# Patient Record
Sex: MALE | Race: WHITE | Employment: OTHER | URBAN - METROPOLITAN AREA
[De-identification: names, ages, dates, MRNs, and addresses within clinical notes are randomized per-mention and may not be internally consistent; named-entity substitution may affect disease eponyms.]

---

## 2017-10-05 ENCOUNTER — HOSPITAL ENCOUNTER (OUTPATIENT)
Dept: RADIOLOGY | Facility: MEDICAL CENTER | Age: 62
End: 2017-10-05

## 2017-10-05 ENCOUNTER — HOSPITAL ENCOUNTER (OUTPATIENT)
Facility: MEDICAL CENTER | Age: 62
End: 2017-10-05

## 2017-10-05 ENCOUNTER — HOSPITAL ENCOUNTER (INPATIENT)
Facility: MEDICAL CENTER | Age: 62
LOS: 2 days | DRG: 683 | End: 2017-10-07
Attending: FAMILY MEDICINE | Admitting: HOSPITALIST
Payer: MEDICARE

## 2017-10-05 PROBLEM — Z72.0 NICOTINE ABUSE: Status: ACTIVE | Noted: 2017-10-05

## 2017-10-05 PROBLEM — N40.1 BENIGN PROSTATIC HYPERPLASIA WITH INCOMPLETE BLADDER EMPTYING: Status: ACTIVE | Noted: 2017-10-05

## 2017-10-05 PROBLEM — N17.9 AKI (ACUTE KIDNEY INJURY) (HCC): Status: ACTIVE | Noted: 2017-10-05

## 2017-10-05 PROBLEM — R39.14 BENIGN PROSTATIC HYPERPLASIA WITH INCOMPLETE BLADDER EMPTYING: Status: ACTIVE | Noted: 2017-10-05

## 2017-10-05 PROBLEM — E87.0 HYPERNATREMIA: Status: ACTIVE | Noted: 2017-10-05

## 2017-10-05 PROCEDURE — 700105 HCHG RX REV CODE 258: Performed by: INTERNAL MEDICINE

## 2017-10-05 PROCEDURE — 700111 HCHG RX REV CODE 636 W/ 250 OVERRIDE (IP): Performed by: INTERNAL MEDICINE

## 2017-10-05 PROCEDURE — 770006 HCHG ROOM/CARE - MED/SURG/GYN SEMI*

## 2017-10-05 PROCEDURE — 700102 HCHG RX REV CODE 250 W/ 637 OVERRIDE(OP): Performed by: INTERNAL MEDICINE

## 2017-10-05 PROCEDURE — A9270 NON-COVERED ITEM OR SERVICE: HCPCS | Performed by: INTERNAL MEDICINE

## 2017-10-05 RX ORDER — HEPARIN SODIUM 5000 [USP'U]/ML
5000 INJECTION, SOLUTION INTRAVENOUS; SUBCUTANEOUS EVERY 8 HOURS
Status: DISCONTINUED | OUTPATIENT
Start: 2017-10-05 | End: 2017-10-07 | Stop reason: HOSPADM

## 2017-10-05 RX ORDER — PROMETHAZINE HYDROCHLORIDE 12.5 MG/1
12.5-25 SUPPOSITORY RECTAL EVERY 4 HOURS PRN
Status: DISCONTINUED | OUTPATIENT
Start: 2017-10-05 | End: 2017-10-07 | Stop reason: HOSPADM

## 2017-10-05 RX ORDER — SODIUM CHLORIDE 9 MG/ML
INJECTION, SOLUTION INTRAVENOUS CONTINUOUS
Status: DISCONTINUED | OUTPATIENT
Start: 2017-10-05 | End: 2017-10-07

## 2017-10-05 RX ORDER — ONDANSETRON 2 MG/ML
4 INJECTION INTRAMUSCULAR; INTRAVENOUS EVERY 4 HOURS PRN
Status: DISCONTINUED | OUTPATIENT
Start: 2017-10-05 | End: 2017-10-07 | Stop reason: HOSPADM

## 2017-10-05 RX ORDER — ACETAMINOPHEN 325 MG/1
650 TABLET ORAL EVERY 6 HOURS PRN
Status: DISCONTINUED | OUTPATIENT
Start: 2017-10-05 | End: 2017-10-07 | Stop reason: HOSPADM

## 2017-10-05 RX ORDER — BISACODYL 10 MG
10 SUPPOSITORY, RECTAL RECTAL
Status: DISCONTINUED | OUTPATIENT
Start: 2017-10-05 | End: 2017-10-07 | Stop reason: HOSPADM

## 2017-10-05 RX ORDER — POLYETHYLENE GLYCOL 3350 17 G/17G
1 POWDER, FOR SOLUTION ORAL
Status: DISCONTINUED | OUTPATIENT
Start: 2017-10-05 | End: 2017-10-07 | Stop reason: HOSPADM

## 2017-10-05 RX ORDER — AMOXICILLIN 250 MG
2 CAPSULE ORAL 2 TIMES DAILY
Status: DISCONTINUED | OUTPATIENT
Start: 2017-10-05 | End: 2017-10-07 | Stop reason: HOSPADM

## 2017-10-05 RX ORDER — PROMETHAZINE HYDROCHLORIDE 25 MG/1
12.5-25 TABLET ORAL EVERY 4 HOURS PRN
Status: DISCONTINUED | OUTPATIENT
Start: 2017-10-05 | End: 2017-10-07 | Stop reason: HOSPADM

## 2017-10-05 RX ORDER — ONDANSETRON 4 MG/1
4 TABLET, ORALLY DISINTEGRATING ORAL EVERY 4 HOURS PRN
Status: DISCONTINUED | OUTPATIENT
Start: 2017-10-05 | End: 2017-10-07 | Stop reason: HOSPADM

## 2017-10-05 RX ORDER — LANSOPRAZOLE 30 MG/1
30 CAPSULE, DELAYED RELEASE ORAL PRN
COMMUNITY
End: 2019-07-18

## 2017-10-05 RX ADMIN — HEPARIN SODIUM 5000 UNITS: 5000 INJECTION, SOLUTION INTRAVENOUS; SUBCUTANEOUS at 21:01

## 2017-10-05 RX ADMIN — STANDARDIZED SENNA CONCENTRATE AND DOCUSATE SODIUM 2 TABLET: 8.6; 5 TABLET, FILM COATED ORAL at 21:01

## 2017-10-05 RX ADMIN — SODIUM CHLORIDE: 9 INJECTION, SOLUTION INTRAVENOUS at 21:05

## 2017-10-05 ASSESSMENT — ENCOUNTER SYMPTOMS
EYE DISCHARGE: 0
HEARTBURN: 0
DIARRHEA: 0
EYE PAIN: 0
PALPITATIONS: 0
HEADACHES: 0
INSOMNIA: 0
CONSTIPATION: 1
NAUSEA: 0
SHORTNESS OF BREATH: 0
FOCAL WEAKNESS: 0
ORTHOPNEA: 0
SEIZURES: 0
CHILLS: 0
SPUTUM PRODUCTION: 0
COUGH: 0
STRIDOR: 0
NERVOUS/ANXIOUS: 0
MYALGIAS: 0
DEPRESSION: 0
BLURRED VISION: 0
VOMITING: 0
EYE REDNESS: 0
BACK PAIN: 0
WEIGHT LOSS: 0
DIZZINESS: 0
ABDOMINAL PAIN: 1
FEVER: 0
NECK PAIN: 0

## 2017-10-05 ASSESSMENT — PAIN SCALES - GENERAL: PAINLEVEL_OUTOF10: 0

## 2017-10-05 ASSESSMENT — LIFESTYLE VARIABLES
EVER_SMOKED: YES
DO YOU DRINK ALCOHOL: NO

## 2017-10-05 NOTE — PROGRESS NOTES
Direct admit from Dr. Jewell at [facility]. Dr. Mccormack/Yash accepting for Acute Kidney injury and Obstructive Uropathy. ADT signed and held 10/05/2017 at 1450 and will need to be released upon patient arrival to unit. Patient arriving via ground transport.

## 2017-10-06 ENCOUNTER — APPOINTMENT (OUTPATIENT)
Dept: RADIOLOGY | Facility: MEDICAL CENTER | Age: 62
DRG: 683 | End: 2017-10-06
Attending: INTERNAL MEDICINE
Payer: MEDICARE

## 2017-10-06 ENCOUNTER — RESOLUTE PROFESSIONAL BILLING HOSPITAL PROF FEE (OUTPATIENT)
Dept: HOSPITALIST | Facility: MEDICAL CENTER | Age: 62
End: 2017-10-06
Payer: MEDICARE

## 2017-10-06 PROBLEM — I10 HYPERTENSION: Status: ACTIVE | Noted: 2017-10-06

## 2017-10-06 LAB
ALBUMIN SERPL BCP-MCNC: 3.4 G/DL (ref 3.2–4.9)
ALBUMIN/GLOB SERPL: 1.4 G/DL
ALP SERPL-CCNC: 78 U/L (ref 30–99)
ALT SERPL-CCNC: <5 U/L (ref 2–50)
ANION GAP SERPL CALC-SCNC: 9 MMOL/L (ref 0–11.9)
AST SERPL-CCNC: 9 U/L (ref 12–45)
BILIRUB SERPL-MCNC: 0.6 MG/DL (ref 0.1–1.5)
BUN SERPL-MCNC: 26 MG/DL (ref 8–22)
CALCIUM SERPL-MCNC: 8.3 MG/DL (ref 8.5–10.5)
CHLORIDE SERPL-SCNC: 112 MMOL/L (ref 96–112)
CO2 SERPL-SCNC: 24 MMOL/L (ref 20–33)
CREAT SERPL-MCNC: 2.98 MG/DL (ref 0.5–1.4)
ERYTHROCYTE [DISTWIDTH] IN BLOOD BY AUTOMATED COUNT: 44.1 FL (ref 35.9–50)
GFR SERPL CREATININE-BSD FRML MDRD: 21 ML/MIN/1.73 M 2
GLOBULIN SER CALC-MCNC: 2.5 G/DL (ref 1.9–3.5)
GLUCOSE SERPL-MCNC: 98 MG/DL (ref 65–99)
HCT VFR BLD AUTO: 41 % (ref 42–52)
HGB BLD-MCNC: 13.3 G/DL (ref 14–18)
MCH RBC QN AUTO: 28.5 PG (ref 27–33)
MCHC RBC AUTO-ENTMCNC: 32.4 G/DL (ref 33.7–35.3)
MCV RBC AUTO: 87.8 FL (ref 81.4–97.8)
PLATELET # BLD AUTO: 234 K/UL (ref 164–446)
PMV BLD AUTO: 9.6 FL (ref 9–12.9)
POTASSIUM SERPL-SCNC: 4 MMOL/L (ref 3.6–5.5)
PROT SERPL-MCNC: 5.9 G/DL (ref 6–8.2)
PSA SERPL-MCNC: 13.04 NG/ML (ref 0–4)
RBC # BLD AUTO: 4.67 M/UL (ref 4.7–6.1)
SODIUM SERPL-SCNC: 145 MMOL/L (ref 135–145)
WBC # BLD AUTO: 5.6 K/UL (ref 4.8–10.8)

## 2017-10-06 PROCEDURE — 76775 US EXAM ABDO BACK WALL LIM: CPT

## 2017-10-06 PROCEDURE — 700102 HCHG RX REV CODE 250 W/ 637 OVERRIDE(OP): Performed by: HOSPITALIST

## 2017-10-06 PROCEDURE — 99223 1ST HOSP IP/OBS HIGH 75: CPT | Performed by: INTERNAL MEDICINE

## 2017-10-06 PROCEDURE — 700111 HCHG RX REV CODE 636 W/ 250 OVERRIDE (IP): Performed by: INTERNAL MEDICINE

## 2017-10-06 PROCEDURE — 90732 PPSV23 VACC 2 YRS+ SUBQ/IM: CPT | Performed by: FAMILY MEDICINE

## 2017-10-06 PROCEDURE — 84153 ASSAY OF PSA TOTAL: CPT

## 2017-10-06 PROCEDURE — 700102 HCHG RX REV CODE 250 W/ 637 OVERRIDE(OP): Performed by: NURSE PRACTITIONER

## 2017-10-06 PROCEDURE — 700102 HCHG RX REV CODE 250 W/ 637 OVERRIDE(OP): Performed by: INTERNAL MEDICINE

## 2017-10-06 PROCEDURE — 90471 IMMUNIZATION ADMIN: CPT

## 2017-10-06 PROCEDURE — A9270 NON-COVERED ITEM OR SERVICE: HCPCS | Performed by: HOSPITALIST

## 2017-10-06 PROCEDURE — A9270 NON-COVERED ITEM OR SERVICE: HCPCS | Performed by: NURSE PRACTITIONER

## 2017-10-06 PROCEDURE — A9270 NON-COVERED ITEM OR SERVICE: HCPCS | Performed by: INTERNAL MEDICINE

## 2017-10-06 PROCEDURE — 85027 COMPLETE CBC AUTOMATED: CPT

## 2017-10-06 PROCEDURE — 700111 HCHG RX REV CODE 636 W/ 250 OVERRIDE (IP): Performed by: FAMILY MEDICINE

## 2017-10-06 PROCEDURE — 770006 HCHG ROOM/CARE - MED/SURG/GYN SEMI*

## 2017-10-06 PROCEDURE — 36415 COLL VENOUS BLD VENIPUNCTURE: CPT

## 2017-10-06 PROCEDURE — 80053 COMPREHEN METABOLIC PANEL: CPT

## 2017-10-06 RX ORDER — TAMSULOSIN HYDROCHLORIDE 0.4 MG/1
0.4 CAPSULE ORAL
Status: DISCONTINUED | OUTPATIENT
Start: 2017-10-06 | End: 2017-10-07

## 2017-10-06 RX ORDER — LABETALOL HYDROCHLORIDE 5 MG/ML
10 INJECTION, SOLUTION INTRAVENOUS EVERY 4 HOURS PRN
Status: DISCONTINUED | OUTPATIENT
Start: 2017-10-06 | End: 2017-10-07 | Stop reason: HOSPADM

## 2017-10-06 RX ORDER — AMLODIPINE BESYLATE 5 MG/1
5 TABLET ORAL
Status: DISCONTINUED | OUTPATIENT
Start: 2017-10-06 | End: 2017-10-07

## 2017-10-06 RX ADMIN — TAMSULOSIN HYDROCHLORIDE 0.4 MG: 0.4 CAPSULE ORAL at 09:08

## 2017-10-06 RX ADMIN — AMLODIPINE BESYLATE 5 MG: 5 TABLET ORAL at 11:28

## 2017-10-06 RX ADMIN — PNEUMOCOCCAL VACCINE POLYVALENT 25 MCG
25; 25; 25; 25; 25; 25; 25; 25; 25; 25; 25; 25; 25; 25; 25; 25; 25; 25; 25; 25; 25; 25; 25 INJECTION, SOLUTION INTRAMUSCULAR; SUBCUTANEOUS at 09:08

## 2017-10-06 RX ADMIN — HEPARIN SODIUM 5000 UNITS: 5000 INJECTION, SOLUTION INTRAVENOUS; SUBCUTANEOUS at 20:59

## 2017-10-06 RX ADMIN — HEPARIN SODIUM 5000 UNITS: 5000 INJECTION, SOLUTION INTRAVENOUS; SUBCUTANEOUS at 13:43

## 2017-10-06 RX ADMIN — STANDARDIZED SENNA CONCENTRATE AND DOCUSATE SODIUM 2 TABLET: 8.6; 5 TABLET, FILM COATED ORAL at 09:08

## 2017-10-06 RX ADMIN — HEPARIN SODIUM 5000 UNITS: 5000 INJECTION, SOLUTION INTRAVENOUS; SUBCUTANEOUS at 05:36

## 2017-10-06 ASSESSMENT — ENCOUNTER SYMPTOMS
NAUSEA: 0
PALPITATIONS: 0
DIARRHEA: 0
VOMITING: 0
BACK PAIN: 0
CONSTIPATION: 0
ABDOMINAL PAIN: 0
BLURRED VISION: 0
SHORTNESS OF BREATH: 0
MYALGIAS: 0
WEAKNESS: 0
CHILLS: 0
FOCAL WEAKNESS: 0
HEADACHES: 0
FEVER: 0
DOUBLE VISION: 0
COUGH: 0
DIZZINESS: 0

## 2017-10-06 ASSESSMENT — PAIN SCALES - GENERAL
PAINLEVEL_OUTOF10: 2
PAINLEVEL_OUTOF10: 0

## 2017-10-06 NOTE — H&P
Hospital Medicine History and Physical      Date of Service  10/5/2017    Chief Complaint  No chief complaint on file.      History of Presenting Illness  Joann is a 62 y.o. Male, directly admitted to the hospital for PB from obstructive uropathy. Per patient, he has been difficulty having initiation of urination for the past 2-3 weeks. So he initially presented to outside facility due to abdominal pain. He was found to have urinary retention and PB. [place] was placed and removed >2L of urine. He was transferred here for further management. He is feeling much better with his abdominal discomfort. Dr. Jauregui was consulted and will see him tomorrow.    Primary Care Physician  No primary care provider on file.      Code Status  Full code    Review of Systems  Review of Systems   Constitutional: Negative for chills, fever and weight loss.   HENT: Negative for congestion and nosebleeds.    Eyes: Negative for blurred vision, pain, discharge and redness.   Respiratory: Negative for cough, sputum production, shortness of breath and stridor.    Cardiovascular: Negative for chest pain, palpitations and orthopnea.   Gastrointestinal: Positive for abdominal pain and constipation. Negative for diarrhea, heartburn, nausea and vomiting.   Genitourinary: Negative for dysuria, frequency and urgency.   Musculoskeletal: Negative for back pain, myalgias and neck pain.   Skin: Negative for itching and rash.   Neurological: Negative for dizziness, focal weakness, seizures and headaches.   Psychiatric/Behavioral: Negative for depression. The patient is not nervous/anxious and does not have insomnia.      Please see HPI, all other systems were reviewed and are negative (AMA/CMS criteria)     Past Medical History  No past medical history on file.    Surgical History  No past surgical history on file.    Medications  No current facility-administered medications on file prior to encounter.      No current outpatient prescriptions on file  prior to encounter.     Family History  No family history on file.  No pertinent family history    Social History  Social History   Substance Use Topics   • Smoking status: Not on file   • Smokeless tobacco: Not on file   • Alcohol use Not on file     Smoke: smoke around 5-10 cigarettes a day  Allergies  No Known Allergies     Physical Exam  Laboratory   Hemodynamics  Temp (24hrs), Av.7 °C (98 °F), Min:36.6 °C (97.8 °F), Max:36.7 °C (98.1 °F)   Temperature: 36.6 °C (97.8 °F)  Pulse  Av.5  Min: 79  Max: 90    Blood Pressure: 152/76      Respiratory      Respiration: 17, Pulse Oximetry: 91 %             Physical Exam   Constitutional: He is oriented to person, place, and time. No distress.   HENT:   Head: Normocephalic and atraumatic.   Mouth/Throat: Oropharynx is clear and moist.   Eyes: Conjunctivae and EOM are normal. Pupils are equal, round, and reactive to light.   Neck: Normal range of motion. Neck supple. No tracheal deviation present. No thyromegaly present.   Cardiovascular: Normal rate and regular rhythm.    No murmur heard.  Pulmonary/Chest: Effort normal and breath sounds normal. No respiratory distress. He has no wheezes.   Abdominal: Soft. Bowel sounds are normal. He exhibits no distension. There is no tenderness.   Genitourinary:   Genitourinary Comments: On shoemaker   Musculoskeletal: He exhibits no edema or tenderness.   Neurological: He is alert and oriented to person, place, and time. No cranial nerve deficit.   Skin: Skin is warm and dry. He is not diaphoretic. No erythema.   Psychiatric: He has a normal mood and affect. His behavior is normal. Thought content normal.               No results for input(s): ALTSGPT, ASTSGOT, ALKPHOSPHAT, TBILIRUBIN, DBILIRUBIN, GAMMAGT, AMYLASE, LIPASE, ALB, PREALBUMIN, GLUCOSE in the last 72 hours.              No results found for: TROPONINI    Imaging  No orders to display       Labs: cbc: wnl  Na: 150, K 4.4, Cl117, Co2 27, Bun: 40, Cr: 3.6    Assessment/Plan     I anticipate this patient will require at least two midnights for appropriate medical management, necessitating inpatient admission.    PB (acute kidney injury) (CMS-HCC)- (present on admission)   Assessment & Plan    Cr: 3.6  From obstructive uropathy  On shoemaker  On IVF  Avoid nephrotoxic drugs  Renal ultrasound pending  Urology: consulted        Nicotine abuse   Assessment & Plan    Smoking cessation education was given        Hypernatremia- (present on admission)   Assessment & Plan    On IVF  Follow cmp in am        Benign prostatic hyperplasia with incomplete bladder emptying- (present on admission)   Assessment & Plan    Plan as above            Prophylaxis:  sc heparin

## 2017-10-06 NOTE — CARE PLAN
Problem: Urinary Elimination:  Goal: Ability to reestablish a normal urinary elimination pattern will improve    Intervention: Evaluate need to continue indwelling urinary catheter  Shoemaker cath cdi,patent,shoemaker catheter given.

## 2017-10-06 NOTE — CONSULTS
DATE OF CONSULTATION: 10/6/2017    REQUESTING PHYSICIAN:  Earl Olivarez M.D.    CONSULTING PHYSICIAN: Urology Clotilde Meza PA-C and Humberto Zafar M.D.    REASON FOR CONSULTATION: Obstructive uropathy      HISTORY OF PRESENT ILLNESS: 63 y/o male presents due to abdominal pain present for 2-3 weeks.  Shoemaker was placed at initial presentation and >2 Liter of urine was drained.  Pt. Denies history of difficulty urinating or prior urology workup.  Denies use of flomax or other urology medications.  Admits that one week ago he started having slow flow, dribbling, and frequency without urgency, hematuria, nocturia, or dysuria.  Was transferred from [facility] for PB with a Creatinine of 3.6.  Renal ultasound done in ER shows mild bilateral pelviectasis.  Currently feels well; shoemaker in place and draining clear urine. Creatinine improved to 2.98.  Denies N/V, fever, chills, SOB.  Admits headache.        PAST MEDICAL HISTORY: No past medical history on file.      MEDICATIONS: Reviewed.     ALLERGIES: NKDA    FAMILY HISTORY: Non-contributory.    SOCIAL HISTORY: Smokes 5-10 cigarettes a day.  Denies alcohol and drug use.    REVIEW OF SYSTEMS:   All other review of systems were negative except under history of present illness.     PHYSICAL EXAMINATION:   GENERAL:  Well developed, well nourished.  NAD.   HEAD: Normocephalic, atraumatic.  Good symmetry.  NECK: Symmetrical. Supple, full ROM.  LUNGS: Normal respiratory effort.  CTAB.   ABDOMEN: Soft, Non distended.  Benign. No TTP.  : Circumcised phallus with shoemaker catheter in place.  Draining pink tinged urine without clots.  SKIN: Warm, dry.  No rashes.  NEUROLOGIC: A&O X3    LABORATORY DATA: Recent labs were reviewed.     IMAGING: Renal U/S  Impression       Mild bilateral pelviectasis.    Bladder wall thickening not excluded. Bladder is decompressed by a Shoemaker catheter.         IMPRESSION:   Obstructive uropathy with shoemaker catheter in place  PB  B/L  pelviectasis    PLAN:  1.No emergent need for treatment at this time.  Continue flomax, trend renal function.  Will keep shoemaker for at least 2 weeks and plan for outpatient voiding trial.  2.  We will follow.    Plan discussed with RN, pt and Dr Zafar who is aware of this consult and has directed this plan of care.

## 2017-10-06 NOTE — PROGRESS NOTES
Assumed care of patient at 0700. Patient with shoemaker in place draining dark aden, blood tinged urine. IVF running through PIV without difficulty. US of abdomen completed this am and diet advanced to Regular for lunch. SW consult placed for patient's concerns about living situation after discharge. Call bell and personal items within reach. Will continue to monitor.     Emelia Johnson RN

## 2017-10-06 NOTE — ASSESSMENT & PLAN NOTE
Renal US shows mild pelviectasis with no other acute finding.  Cr 2.98.  Likely from obstructive uropathy.  PSA ordered.  [place] in place.  Conintue IVF  Avoid nephrotoxic drugs  Urology to follow.

## 2017-10-06 NOTE — PROGRESS NOTES
Patient skin intact,some pink and dusky colored areas on bilateral shin intact,assessed with another nurse,Zara SAUCEDO.

## 2017-10-06 NOTE — CARE PLAN
Problem: Safety  Goal: Will remain free from falls    Intervention: Implement fall precautions  Call light and personal belongings within reach. Pt instructed to call for assistance, pt verbalizes understanding. Non skid socks on. Bed in lowest position.

## 2017-10-06 NOTE — PROGRESS NOTES
Patient is a direct admit from [facility],alert/oriented x4,room air,1Lof NS given,npo at midnight for possible procedure tomorrow,refused bed alarm despite education,fall prevention in placed,denies pain.

## 2017-10-06 NOTE — PROGRESS NOTES
Hospital Medicine Interval Note  Date of Service:  10/6/2017    Chief Complaint  62 y.o.-year-old male admitted 10/5/2017 with urinary retention.  Found to have PB secondary to obstructive uropathy.    Interval Problem Update  [place] in place.  Hematuria improved.  Creatinine 2.98.  Renal US negative for acute abnormality.     Consultants/Specialty  Urology     Disposition  Anticipate home at d/c when stable.      Review of Systems   Constitutional: Negative for chills and fever.   HENT: Negative for congestion.    Eyes: Negative for blurred vision and double vision.   Respiratory: Negative for cough and shortness of breath.    Cardiovascular: Negative for chest pain, palpitations and leg swelling.   Gastrointestinal: Negative for abdominal pain, constipation, diarrhea, nausea and vomiting.   Genitourinary: Positive for frequency and hematuria.   Musculoskeletal: Negative for back pain and myalgias.   Skin: Negative for itching and rash.   Neurological: Negative for dizziness, focal weakness, weakness and headaches.      Physical Exam Laboratory/Imaging   Vitals:    10/05/17 1900 10/05/17 2052 10/06/17 0422 10/06/17 0914   BP: 152/76  129/75 (!) 175/98   Pulse: 79  68 75   Resp: 17  18 12   Temp: 36.6 °C (97.8 °F)  36.2 °C (97.1 °F) 36.6 °C (97.9 °F)   SpO2: 91%  93% 94%   Weight:  104.1 kg (229 lb 8 oz)     Height:         Physical Exam   Constitutional: He is oriented to person, place, and time. He appears well-developed and well-nourished. No distress.   HENT:   Head: Normocephalic and atraumatic.   Mouth/Throat: No oropharyngeal exudate.   Eyes: Conjunctivae are normal. Right eye exhibits no discharge. Left eye exhibits no discharge.   Neck: Neck supple. No tracheal deviation present.   Cardiovascular: Normal rate, regular rhythm, normal heart sounds and intact distal pulses.    No murmur heard.  Pulmonary/Chest: Effort normal and breath sounds normal. No stridor. No respiratory distress. He has no wheezes.    Abdominal: Soft. Bowel sounds are normal. He exhibits no distension. There is no tenderness.   Genitourinary:   Genitourinary Comments: Shoemaker catheter in place.   Musculoskeletal: Normal range of motion. He exhibits no edema or deformity.   Neurological: He is alert and oriented to person, place, and time. No cranial nerve deficit.   Skin: Skin is warm and dry. No rash noted. He is not diaphoretic. No erythema.    Lab Results   Component Value Date/Time    WBC 5.6 10/06/2017 03:23 AM    HEMOGLOBIN 13.3 (L) 10/06/2017 03:23 AM    HEMATOCRIT 41.0 (L) 10/06/2017 03:23 AM    PLATELETCT 234 10/06/2017 03:23 AM     Lab Results   Component Value Date/Time    SODIUM 145 10/06/2017 03:23 AM    POTASSIUM 4.0 10/06/2017 03:23 AM    CHLORIDE 112 10/06/2017 03:23 AM    CO2 24 10/06/2017 03:23 AM    GLUCOSE 98 10/06/2017 03:23 AM    BUN 26 (H) 10/06/2017 03:23 AM    CREATININE 2.98 (H) 10/06/2017 03:23 AM      Assessment/Plan    PB (acute kidney injury) (CMS-HCC)- (present on admission)   Assessment & Plan    Renal US shows mild pelviectasis with no other acute finding.  Cr 2.98.  Likely from obstructive uropathy.  PSA ordered.  Shoemaker in place.  Conintue IVF  Avoid nephrotoxic drugs  Urology to follow.        Hypertension- (present on admission)   Assessment & Plan    Start norvasc.        Nicotine abuse   Assessment & Plan    Smoking cessation education was given        Hypernatremia- (present on admission)   Assessment & Plan    Improved with IVF.  Follow bmp.        Benign prostatic hyperplasia with incomplete bladder emptying- (present on admission)   Assessment & Plan    Continue flomax.  Will need shoemaker catheter for 2 weeks.  Urology following.             Shoemaker catheter::  Urinary Tract Retention or Urinary Tract Obstruction  DVT prophylaxis pharmacological::  Heparin  Ulcer Prophylaxis::  Not indicated

## 2017-10-07 VITALS
TEMPERATURE: 98.1 F | DIASTOLIC BLOOD PRESSURE: 78 MMHG | HEIGHT: 68 IN | RESPIRATION RATE: 20 BRPM | SYSTOLIC BLOOD PRESSURE: 146 MMHG | WEIGHT: 229.5 LBS | OXYGEN SATURATION: 92 % | BODY MASS INDEX: 34.78 KG/M2 | HEART RATE: 86 BPM

## 2017-10-07 LAB
ANION GAP SERPL CALC-SCNC: 8 MMOL/L (ref 0–11.9)
BUN SERPL-MCNC: 22 MG/DL (ref 8–22)
CALCIUM SERPL-MCNC: 8.9 MG/DL (ref 8.5–10.5)
CHLORIDE SERPL-SCNC: 109 MMOL/L (ref 96–112)
CO2 SERPL-SCNC: 27 MMOL/L (ref 20–33)
CREAT SERPL-MCNC: 2.15 MG/DL (ref 0.5–1.4)
ERYTHROCYTE [DISTWIDTH] IN BLOOD BY AUTOMATED COUNT: 41.5 FL (ref 35.9–50)
GFR SERPL CREATININE-BSD FRML MDRD: 31 ML/MIN/1.73 M 2
GLUCOSE SERPL-MCNC: 96 MG/DL (ref 65–99)
HCT VFR BLD AUTO: 41.8 % (ref 42–52)
HGB BLD-MCNC: 13.8 G/DL (ref 14–18)
MCH RBC QN AUTO: 28.6 PG (ref 27–33)
MCHC RBC AUTO-ENTMCNC: 33 G/DL (ref 33.7–35.3)
MCV RBC AUTO: 86.7 FL (ref 81.4–97.8)
PLATELET # BLD AUTO: 245 K/UL (ref 164–446)
PMV BLD AUTO: 10.3 FL (ref 9–12.9)
POTASSIUM SERPL-SCNC: 4.1 MMOL/L (ref 3.6–5.5)
RBC # BLD AUTO: 4.82 M/UL (ref 4.7–6.1)
SODIUM SERPL-SCNC: 144 MMOL/L (ref 135–145)
WBC # BLD AUTO: 5.8 K/UL (ref 4.8–10.8)

## 2017-10-07 PROCEDURE — A9270 NON-COVERED ITEM OR SERVICE: HCPCS | Performed by: NURSE PRACTITIONER

## 2017-10-07 PROCEDURE — 700105 HCHG RX REV CODE 258: Performed by: INTERNAL MEDICINE

## 2017-10-07 PROCEDURE — 99239 HOSP IP/OBS DSCHRG MGMT >30: CPT | Performed by: HOSPITALIST

## 2017-10-07 PROCEDURE — 700111 HCHG RX REV CODE 636 W/ 250 OVERRIDE (IP): Performed by: INTERNAL MEDICINE

## 2017-10-07 PROCEDURE — 80048 BASIC METABOLIC PNL TOTAL CA: CPT

## 2017-10-07 PROCEDURE — 700101 HCHG RX REV CODE 250: Performed by: NURSE PRACTITIONER

## 2017-10-07 PROCEDURE — 36415 COLL VENOUS BLD VENIPUNCTURE: CPT

## 2017-10-07 PROCEDURE — A9270 NON-COVERED ITEM OR SERVICE: HCPCS | Performed by: HOSPITALIST

## 2017-10-07 PROCEDURE — 700102 HCHG RX REV CODE 250 W/ 637 OVERRIDE(OP): Performed by: HOSPITALIST

## 2017-10-07 PROCEDURE — 700102 HCHG RX REV CODE 250 W/ 637 OVERRIDE(OP): Performed by: NURSE PRACTITIONER

## 2017-10-07 PROCEDURE — 85027 COMPLETE CBC AUTOMATED: CPT

## 2017-10-07 RX ORDER — DOXAZOSIN MESYLATE 1 MG/1
1 TABLET ORAL
Status: DISCONTINUED | OUTPATIENT
Start: 2017-10-07 | End: 2017-10-07 | Stop reason: HOSPADM

## 2017-10-07 RX ORDER — DOXAZOSIN MESYLATE 1 MG/1
1 TABLET ORAL
Qty: 30 TAB | Refills: 2 | Status: SHIPPED | OUTPATIENT
Start: 2017-10-07 | End: 2018-06-11

## 2017-10-07 RX ORDER — TAMSULOSIN HYDROCHLORIDE 0.4 MG/1
0.4 CAPSULE ORAL
Qty: 30 CAP | Refills: 3 | Status: SHIPPED | OUTPATIENT
Start: 2017-10-07 | End: 2017-11-29

## 2017-10-07 RX ORDER — AMLODIPINE BESYLATE 10 MG/1
10 TABLET ORAL
Status: DISCONTINUED | OUTPATIENT
Start: 2017-10-07 | End: 2017-10-07 | Stop reason: HOSPADM

## 2017-10-07 RX ORDER — AMLODIPINE BESYLATE 10 MG/1
10 TABLET ORAL DAILY
Qty: 30 TAB | Refills: 3 | Status: ON HOLD | OUTPATIENT
Start: 2017-10-07 | End: 2017-12-09

## 2017-10-07 RX ADMIN — TAMSULOSIN HYDROCHLORIDE 0.4 MG: 0.4 CAPSULE ORAL at 09:31

## 2017-10-07 RX ADMIN — DOXAZOSIN MESYLATE 1 MG: 1 TABLET ORAL at 13:52

## 2017-10-07 RX ADMIN — AMLODIPINE BESYLATE 10 MG: 10 TABLET ORAL at 09:31

## 2017-10-07 RX ADMIN — SODIUM CHLORIDE: 9 INJECTION, SOLUTION INTRAVENOUS at 03:20

## 2017-10-07 RX ADMIN — LABETALOL HYDROCHLORIDE 10 MG: 5 INJECTION, SOLUTION INTRAVENOUS at 11:17

## 2017-10-07 RX ADMIN — HEPARIN SODIUM 5000 UNITS: 5000 INJECTION, SOLUTION INTRAVENOUS; SUBCUTANEOUS at 05:13

## 2017-10-07 ASSESSMENT — PAIN SCALES - GENERAL: PAINLEVEL_OUTOF10: 3

## 2017-10-07 ASSESSMENT — LIFESTYLE VARIABLES: EVER_SMOKED: YES

## 2017-10-07 NOTE — PROGRESS NOTES
Discharge order received. PIV removed, tip intact, no issues noted.  Printed discharge instructions and prescriptions given to pt. All discharge education complete, specifically need to f/u with Urology in 2 wks, f/u with PCP in the next couple wks for hypertension and s/s of hypotension, hypertension and  UTI, all questions and concerns were addressed. Pt left with [place] catheter in place, extensive education done on care at home of [place] catheter, pt able to verbalize and demonstrate proper care to this RN prior to discharge. Education done on reasons to call PCP pr come back through ED, such as tachycardia, fever, chills, dizziness and so on. VSS. BP stable prior to discharge at  146/78. Labs stable. Pt decline staff transport, walked off unit with step father.

## 2017-10-07 NOTE — CARE PLAN
Problem: Safety  Goal: Will remain free from falls  Outcome: PROGRESSING AS EXPECTED  Patient out of bed independently, non skid socks in place, steady gait

## 2017-10-07 NOTE — CARE PLAN
Problem: Venous Thromboembolism (VTW)/Deep Vein Thrombosis (DVT) Prevention:  Goal: Patient will participate in Venous Thrombosis (VTE)/Deep Vein Thrombosis (DVT)Prevention Measures  Outcome: PROGRESSING AS EXPECTED  Patient ambulating, taking heparin injections

## 2017-10-07 NOTE — CARE PLAN
Problem: Safety  Goal: Will remain free from falls    Intervention: Implement fall precautions  Call light and personal belongings within reach. Pt instructed to call for assistance, pt verbalizes understanding. Non skid socks on.  Bed in lowest position. Hourly rounding in placed      Problem: Venous Thromboembolism (VTW)/Deep Vein Thrombosis (DVT) Prevention:  Goal: Patient will participate in Venous Thrombosis (VTE)/Deep Vein Thrombosis (DVT)Prevention Measures    Intervention: Assess and monitor for anticoagulation complications  Patient is on heparin for dvt prophylaxis without complication.

## 2017-10-07 NOTE — PROGRESS NOTES
Administered pt's morning meds.  Pt is A&O x4.  Pt is being d/c today, will discuss instructions for at home shoemaker care before discharge.    Recheck pt's BP in 1 hour.  Bed in lowest position, call light within reach, no patient concerns at this time. Will continue to monitor.

## 2017-10-07 NOTE — PROGRESS NOTES
notified of increased BP of 182/89 after PO Norvasc and IV Labetalol. New order received from Daniele IRBY.

## 2017-10-07 NOTE — PROGRESS NOTES
1:21 PM         Problem: Discharge Barriers/Planning  Goal: Patient's continuum of care needs will be met  Outcome: PROGRESSING AS EXPECTED  Extensive education done on at home Spain care, discussed need to f/u in 2 wks with Urology with pt's step father who is pt's primary access to transportation. Step Father states he will be able to give pt a ride at time of f/u     Problem: Urinary Elimination:  Goal: Ability to reestablish a normal urinary elimination pattern will improve  Outcome: PROGRESSING AS EXPECTED  [place] catheter draining large amount of urine. [place] catheter to remain in place x 2 wks and pt will f/u with Urology outpatient.

## 2017-10-07 NOTE — PROGRESS NOTES
Patient ambulated to the bathroom few times with some loose stools,no c/o pain,urine output around 2100 was 800 clear dark orange urine output.

## 2017-10-07 NOTE — PROGRESS NOTES
"Urology Progress Note    S: No fevers, chills, nausea or vomiting.  Cr trending down since shoemaker placed.  Good UOP.  O:   Blood pressure (!) 179/97, pulse 86, temperature 36.7 °C (98.1 °F), resp. rate 20, height 1.727 m (5' 8\"), weight 104.1 kg (229 lb 8 oz), SpO2 92 %.  Recent Labs      10/06/17   0323  10/07/17   0325   SODIUM  145  144   POTASSIUM  4.0  4.1   CHLORIDE  112  109   CO2  24  27   GLUCOSE  98  96   BUN  26*  22   CREATININE  2.98*  2.15*   CALCIUM  8.3*  8.9     Recent Labs      10/06/17   0323  10/07/17   0325   WBC  5.6  5.8   RBC  4.67*  4.82   HEMOGLOBIN  13.3*  13.8*   HEMATOCRIT  41.0*  41.8*   MCV  87.8  86.7   MCH  28.5  28.6   MCHC  32.4*  33.0*   RDW  44.1  41.5   PLATELETCT  234  245   MPV  9.6  10.3         Intake/Output Summary (Last 24 hours) at 10/07/17 1152  Last data filed at 10/07/17 1115   Gross per 24 hour   Intake             1420 ml   Output             3100 ml   Net            -1680 ml       Exam:  Abdomen soft, benign.    Urine: clear with shoemaker in place     A/P:    Active Hospital Problems    Diagnosis   • PB (acute kidney injury) (CMS-HCC) [N17.9]     Priority: High   • Hypertension [I10]   • Benign prostatic hyperplasia with incomplete bladder emptying [N40.1, R39.14]   • Hypernatremia [E87.0]   • Nicotine abuse [Z72.0]     PLAN:  1.  We will sign off.    2.  My office will contact pt to arrange follow up appointment in two weeks for voiding trial.    3.  Home with shoemaker and Flomax 0.4mg daily  "

## 2017-10-07 NOTE — PROGRESS NOTES
Rechecked pt's /77, resource RN made aware.  Instructed pt on how to drain shoemaker and attachment of leg bag.

## 2017-10-07 NOTE — DISCHARGE SUMMARY
CHIEF COMPLAINT ON ADMISSION:  Urinary retention.    HISTORY OF PRESENT ILLNESS AND HOSPITAL COURSE:  For complete history and   physical, please review the note posted by Dr. Olivarez on 10/05/2017.  In summary,   this is a 62-year-old male who presented with a 2-3 week period of urinary   retention and difficulty urinating.  On admission to the emergency room, the   patient had a greater than 2 liters of urine on bladder scan.  At that time, a   [place] catheter was placed.  The patient was started on Flomax.  The patient   was found to have a creatinine of 3.6 likely secondary to obstructive   uropathy.  Dr. Zafar assessed the patient and determined that there was no   surgical need.  At this time, the patient would require the [place] for a   2-week period.  At that time, would have a voiding trial as an outpatient.    The patient is, otherwise, stable and had some slow improvement of his   creatinine with IV fluids.  At this time, as the patient is stable, he will be   discharged home.    DISCHARGE PROBLEM LIST:  1.  Acute kidney injury.  2.  Benign prostatic hypertrophy.  3.  Nicotine abuse.    FOLLOWUP APPOINTMENTS:  The patient will follow up with primary care physician   in 1-2 weeks of the discharge.  The patient will also follow with Dr. Zafar in 2 weeks as an outpatient.    DISCHARGE MEDICATIONS:  1.  Norvasc 10 mg tab, take 10 mg by mouth every day.  2.  Flomax 0.4 mg capsule, take 0.4 mg after breakfast.    DISCHARGE DIET:  Regular diet.    ACTIVITY:  As tolerated.    CONSULTATIONS:  Demetrio Zafar MD    LABORATORY DATA:  Sodium 144, potassium 4.1, chloride 109, CO2 of 27, glucose   96, BUN 22, creatinine 2.15, white blood cells 5.8, hemoglobin 13.8,   hematocrit 41.8, platelet count 245.  PSA 13.04.    Total time of discharge process was approximately 38 minutes.       ____________________________________     JAM Romero    CSF / NTS    DD:  10/07/2017 12:13:35  DT:  10/07/2017  12:47:05    D#:  [id number]  Job#:  [id number]

## 2017-10-07 NOTE — DISCHARGE INSTRUCTIONS
Discharge Instructions    Discharged to home by car with relative. Discharged via wheelchair, hospital escort: Yes.  Special equipment needed: Not Applicable    Be sure to schedule a follow-up appointment with your primary care doctor or any specialists as instructed.     Discharge Plan:   Diet Plan: Discussed  Activity Level: Discussed  Smoking Cessation Offered: Patient Counseled  Confirmed Follow up Appointment: Patient to Call and Schedule Appointment  Confirmed Symptoms Management: Discussed  Medication Reconciliation Updated: Yes  Pneumococcal Vaccine Given - only chart on this line when given: Given (See MAR)  Influenza Vaccine Indication: Patient Refuses    I understand that a diet low in cholesterol, fat, and sodium is recommended for good health. Unless I have been given specific instructions below for another diet, I accept this instruction as my diet prescription.   Other diet: regular    Special Instructions: None    · Is patient discharged on Warfarin / Coumadin?   No     · Is patient Post Blood Transfusion?  No      Hypertension  Hypertension, commonly called high blood pressure, is when the force of blood pumping through your arteries is too strong. Your arteries are the blood vessels that carry blood from your heart throughout your body. A blood pressure reading consists of a higher number over a lower number, such as 110/72. The higher number (systolic) is the pressure inside your arteries when your heart pumps. The lower number (diastolic) is the pressure inside your arteries when your heart relaxes. Ideally you want your blood pressure below 120/80.  Hypertension forces your heart to work harder to pump blood. Your arteries may become narrow or stiff. Having untreated or uncontrolled hypertension can cause heart attack, stroke, kidney disease, and other problems.  RISK FACTORS  Some risk factors for high blood pressure are controllable. Others are not.   Risk factors you cannot control include:    · Race. You may be at higher risk if you are .  · Age. Risk increases with age.  · Gender. Men are at higher risk than women before age 45 years. After age 65, women are at higher risk than men.  Risk factors you can control include:  · Not getting enough exercise or physical activity.  · Being overweight.  · Getting too much fat, sugar, calories, or salt in your diet.  · Drinking too much alcohol.  SIGNS AND SYMPTOMS  Hypertension does not usually cause signs or symptoms. Extremely high blood pressure (hypertensive crisis) may cause headache, anxiety, shortness of breath, and nosebleed.  DIAGNOSIS  To check if you have hypertension, your health care provider will measure your blood pressure while you are seated, with your arm held at the level of your heart. It should be measured at least twice using the same arm. Certain conditions can cause a difference in blood pressure between your right and left arms. A blood pressure reading that is higher than normal on one occasion does not mean that you need treatment. If it is not clear whether you have high blood pressure, you may be asked to return on a different day to have your blood pressure checked again. Or, you may be asked to monitor your blood pressure at home for 1 or more weeks.  TREATMENT  Treating high blood pressure includes making lifestyle changes and possibly taking medicine. Living a healthy lifestyle can help lower high blood pressure. You may need to change some of your habits.  Lifestyle changes may include:  · Following the DASH diet. This diet is high in fruits, vegetables, and whole grains. It is low in salt, red meat, and added sugars.  · Keep your sodium intake below 2,300 mg per day.  · Getting at least 30-45 minutes of aerobic exercise at least 4 times per week.  · Losing weight if necessary.  · Not smoking.  · Limiting alcoholic beverages.  · Learning ways to reduce stress.  Your health care provider may prescribe medicine  if lifestyle changes are not enough to get your blood pressure under control, and if one of the following is true:  · You are 18-59 years of age and your systolic blood pressure is above 140.  · You are 60 years of age or older, and your systolic blood pressure is above 150.  · Your diastolic blood pressure is above 90.  · You have diabetes, and your systolic blood pressure is over 140 or your diastolic blood pressure is over 90.  · You have kidney disease and your blood pressure is above 140/90.  · You have heart disease and your blood pressure is above 140/90.  Your personal target blood pressure may vary depending on your medical conditions, your age, and other factors.  HOME CARE INSTRUCTIONS  · Have your blood pressure rechecked as directed by your health care provider.    · Take medicines only as directed by your health care provider. Follow the directions carefully. Blood pressure medicines must be taken as prescribed. The medicine does not work as well when you skip doses. Skipping doses also puts you at risk for problems.  · Do not smoke.    · Monitor your blood pressure at home as directed by your health care provider.   SEEK MEDICAL CARE IF:   · You think you are having a reaction to medicines taken.  · You have recurrent headaches or feel dizzy.  · You have swelling in your ankles.  · You have trouble with your vision.  SEEK IMMEDIATE MEDICAL CARE IF:  · You develop a severe headache or confusion.  · You have unusual weakness, numbness, or feel faint.  · You have severe chest or abdominal pain.  · You vomit repeatedly.  · You have trouble breathing.  MAKE SURE YOU:   · Understand these instructions.  · Will watch your condition.  · Will get help right away if you are not doing well or get worse.     This information is not intended to replace advice given to you by your health care provider. Make sure you discuss any questions you have with your health care provider.     Document Released: 12/18/2006  Document Revised: 05/03/2016 Document Reviewed: 10/10/2014  [employer] Interactive Patient Education ©2016 [employer]      Acute Kidney Injury  Acute kidney injury is any condition in which there is sudden (acute) damage to the kidneys. Acute kidney injury was previously known as acute kidney failure or acute renal failure. The kidneys are two organs that lie on either side of the spine between the middle of the back and the front of the abdomen. The kidneys:  · Remove wastes and extra water from the blood.    · Produce important hormones. These help keep bones strong, regulate blood pressure, and help create red blood cells.    · Balance the fluids and chemicals in the blood and tissues.  A small amount of kidney damage may not cause problems, but a large amount of damage may make it difficult or impossible for the kidneys to work the way they should. Acute kidney injury may develop into long-lasting (chronic) kidney disease. It may also develop into a life-threatening disease called end-stage kidney disease. Acute kidney injury can get worse very quickly, so it should be treated right away. Early treatment may prevent other kidney diseases from developing.  CAUSES   · A problem with blood flow to the kidneys. This may be caused by:    ¨ Blood loss.    ¨ Heart disease.    ¨ Severe burns.    ¨ Liver disease.  · Direct damage to the kidneys. This may be caused by:  ¨ Some medicines.    ¨ A kidney infection.    ¨ Poisoning or consuming toxic substances.    ¨ A surgical wound.    ¨ A blow to the kidney area.    · A problem with urine flow. This may be caused by:    ¨ Cancer.    ¨ Kidney stones.    ¨ An enlarged prostate.  SIGNS AND SYMPTOMS   · Swelling (edema) of the legs, ankles, or feet.    · Tiredness (lethargy).    · Nausea or vomiting.    · Confusion.    · Problems with urination, such as:    ¨ Painful or burning feeling during urination.    ¨ Decreased urine production.    ¨ Frequent accidents in children who  are potty trained.    ¨ Bloody urine.    · Muscle twitches and cramps.    · Shortness of breath.    · Seizures.    · Chest pain or pressure.  Sometimes, no symptoms are present.   DIAGNOSIS  Acute kidney injury may be detected and diagnosed by tests, including blood, urine, imaging, or kidney biopsy tests.   TREATMENT  Treatment of acute kidney injury varies depending on the cause and severity of the kidney damage. In mild cases, no treatment may be needed. The kidneys may heal on their own. If acute kidney injury is more severe, your health care provider will treat the cause of the kidney damage, help the kidneys heal, and prevent complications from occurring. Severe cases may require a procedure to remove toxic wastes from the body (dialysis) or surgery to repair kidney damage. Surgery may involve:   · Repair of a torn kidney.    · Removal of an obstruction.  HOME CARE INSTRUCTIONS  · Follow your prescribed diet.  · Take medicines only as directed by your health care provider.   · Do not take any new medicines (prescription, over-the-counter, or nutritional supplements) unless approved by your health care provider. Many medicines can worsen your kidney damage or may need to have the dose adjusted.    · Keep all follow-up visits as directed by your health care provider. This is important.  · Observe your condition to make sure you are healing as expected.  SEEK IMMEDIATE MEDICAL CARE IF:  · You are feeling ill or have severe pain in the back or side.    · Your symptoms return or you have new symptoms.  · You have any symptoms of end-stage kidney disease. These include:    ¨ Persistent itchiness.    ¨ Loss of appetite.    ¨ Headaches.    ¨ Abnormally dark or light skin.  ¨ Numbness in the hands or feet.    ¨ Easy bruising.    ¨ Frequent hiccups.    ¨ Menstruation stops.    · You have a fever.  · You have increased urine production.  · You have pain or bleeding when urinating.  MAKE SURE YOU:   · Understand these  instructions.  · Will watch your condition.  · Will get help right away if you are not doing well or get worse.     This information is not intended to replace advice given to you by your health care provider. Make sure you discuss any questions you have with your health care provider.     Document Released: 07/02/2012 Document Revised: 01/08/2016 Document Reviewed: 08/16/2013  [employer] Interactive Patient Education ©2016 [employer]    [place] Catheter Care, Adult  A [place] catheter is a soft, flexible tube that is placed into the bladder to drain urine. A [place] catheter may be inserted if:  · You leak urine or are not able to control when you urinate (urinary incontinence).  · You are not able to urinate when you need to (urinary retention).  · You had prostate surgery or surgery on the genitals.  · You have certain medical conditions, such as multiple sclerosis, dementia, or a spinal cord injury.  If you are going home with a [place] catheter in place, follow the instructions below.  TAKING CARE OF THE CATHETER  1. Wash your hands with soap and water.  2. Using mild soap and warm water on a clean washcloth:  ¨ Clean the area on your body closest to the catheter insertion site using a circular motion, moving away from the catheter. Never wipe toward the catheter because this could sweep bacteria up into the urethra and cause infection.  ¨ Remove all traces of soap. Pat the area dry with a clean towel. For males, reposition the foreskin.  3. Attach the catheter to your leg so there is no tension on the catheter. Use adhesive tape or a leg strap. If you are using adhesive tape, remove any sticky residue left behind by the previous tape you used.  4. Keep the drainage bag below the level of the bladder, but keep it off the floor.  5. Check throughout the day to be sure the catheter is working and urine is draining freely. Make sure the tubing does not become kinked.  6. Do not pull on the catheter or try to remove it.  Pulling could damage internal tissues.  TAKING CARE OF THE DRAINAGE BAGS  You will be given two drainage bags to take home. One is a large overnight drainage bag, and the other is a smaller leg bag that fits underneath clothing. You may wear the overnight bag at any time, but you should never wear the smaller leg bag at night. Follow the instructions below for how to empty, change, and clean your drainage bags.  Emptying the Drainage Bag  You must empty your drainage bag when it is  -½ full or at least 2-3 times a day.  1. Wash your hands with soap and water.  2. Keep the drainage bag below your hips, below the level of your bladder. This stops urine from going back into the tubing and into your bladder.  3. Hold the dirty bag over the toilet or a clean container.  4. Open the pour spout at the bottom of the bag and empty the urine into the toilet or container. Do not let the pour spout touch the toilet, container, or any other surface. Doing so can place bacteria on the bag, which can cause an infection.  5. Clean the pour spout with a gauze pad or cotton ball that has rubbing alcohol on it.  6. Close the pour spout.  7. Attach the bag to your leg with adhesive tape or a leg strap.  8. Wash your hands well.  Changing the Drainage Bag  Change your drainage bag once a month or sooner if it starts to smell bad or look dirty. Below are steps to follow when changing the drainage bag.  2. Wash your hands with soap and water.  3. Pinch off the rubber catheter so that urine does not spill out.  4. Disconnect the catheter tube from the drainage tube at the connection valve. Do not let the tubes touch any surface.  5. Clean the end of the catheter tube with an alcohol wipe. Use a different alcohol wipe to clean the end of the drainage tube.  6. Connect the catheter tube to the drainage tube of the clean drainage bag.  7. Attach the new bag to the leg with adhesive tape or a leg strap. Avoid attaching the new bag too  tightly.  8. Wash your hands well.  Cleaning the Drainage Bag  2. Wash your hands with soap and water.  3. Wash the bag in warm, soapy water.  4. Rinse the bag thoroughly with warm water.  5. Fill the bag with a solution of white vinegar and water (1 cup vinegar to 1 qt warm water [.2 L vinegar to 1 L warm water]). Close the bag and soak it for 30 minutes in the solution.  6. Rinse the bag with warm water.  7. Hang the bag to dry with the pour spout open and hanging downward.  8. Store the clean bag (once it is dry) in a clean plastic bag.  9. Wash your hands well.  PREVENTING INFECTION  · Wash your hands before and after handling your catheter.  · Take showers daily and wash the area where the catheter enters your body. Do not take baths. Replace wet leg straps with dry ones, if this applies.  · Do not use powders, sprays, or lotions on the genital area. Only use creams, lotions, or ointments as directed by your caregiver.  · For females, wipe from front to back after each bowel movement.  · Drink enough fluids to keep your urine clear or pale yellow unless you have a fluid restriction.  · Do not let the drainage bag or tubing touch or lie on the floor.  · Wear cotton underwear to absorb moisture and to keep your .  SEEK MEDICAL CARE IF:   · Your urine is cloudy or smells unusually bad.  · Your catheter becomes clogged.  · You are not draining urine into the bag or your bladder feels full.  · Your catheter starts to leak.  SEEK IMMEDIATE MEDICAL CARE IF:   · You have pain, swelling, redness, or pus where the catheter enters the body.  · You have pain in the abdomen, legs, lower back, or bladder.  · You have a fever.  · You see blood fill the catheter, or your urine is pink or red.  · You have nausea, vomiting, or chills.  · Your catheter gets pulled out.  MAKE SURE YOU:   · Understand these instructions.  · Will watch your condition.  · Will get help right away if you are not doing well or get worse.      This information is not intended to replace advice given to you by your health care provider. Make sure you discuss any questions you have with your health care provider.     Document Released: 12/18/2006 Document Revised: 05/04/2015 Document Reviewed: 12/09/2013  [employer] Interactive Patient Education ©2016 [employer]        Depression / Suicide Risk    As you are discharged from this [facility] facility, it is important to learn how to keep safe from harming yourself.    Recognize the warning signs:  · Abrupt changes in personality, positive or negative- including increase in energy   · Giving away possessions  · Change in eating patterns- significant weight changes-  positive or negative  · Change in sleeping patterns- unable to sleep or sleeping all the time   · Unwillingness or inability to communicate  · Depression  · Unusual sadness, discouragement and loneliness  · Talk of wanting to die  · Neglect of personal appearance   · Rebelliousness- reckless behavior  · Withdrawal from people/activities they love  · Confusion- inability to concentrate     If you or a loved one observes any of these behaviors or has concerns about self-harm, here's what you can do:  · Talk about it- your feelings and reasons for harming yourself  · Remove any means that you might use to hurt yourself (examples: pills, rope, extension cords, firearm)  · Get professional help from the community (Mental Health, Substance Abuse, psychological counseling)  · Do not be alone:Call your Safe Contact- someone whom you trust who will be there for you.  · Call your local CRISIS HOTLINE 682-8159 or 450-355-0131  · Call your local Children's Mobile Crisis Response Team [place] (689) 922-9062 or www.[employer]  · Call the toll free National Suicide Prevention Hotlines   · National Suicide Prevention Lifeline 708-956-ZKGO (7296)  · National Hope Line Network 800-SUICIDE (404-4738)

## 2017-11-29 ENCOUNTER — APPOINTMENT (OUTPATIENT)
Dept: ADMISSIONS | Facility: MEDICAL CENTER | Age: 62
End: 2017-11-29
Attending: UROLOGY
Payer: MEDICARE

## 2017-11-29 RX ORDER — FINASTERIDE 5 MG/1
5 TABLET, FILM COATED ORAL EVERY EVENING
COMMUNITY
End: 2018-06-14

## 2017-11-29 NOTE — OR NURSING
preadmit appointment: Pt. Instructed to continue regularly prescribed medication through the day before surgery, instructed to take the following medications, the day of surgery, with a sip of water per anesthesia protocol :amlodipine

## 2017-11-29 NOTE — OR NURSING
Spoke with Coty at Dr. Chávez office pt needs cmp,urine, urine culture and EKG  Pt can go to [facility].  She states  will fax orders  To [facility] and call pt.

## 2017-12-08 ENCOUNTER — HOSPITAL ENCOUNTER (INPATIENT)
Facility: MEDICAL CENTER | Age: 62
LOS: 1 days | DRG: 713 | End: 2017-12-09
Attending: UROLOGY | Admitting: UROLOGY
Payer: MEDICARE

## 2017-12-08 ENCOUNTER — RESOLUTE PROFESSIONAL BILLING HOSPITAL PROF FEE (OUTPATIENT)
Dept: HOSPITALIST | Facility: MEDICAL CENTER | Age: 62
End: 2017-12-08
Payer: MEDICARE

## 2017-12-08 PROBLEM — I48.91 NEW ONSET ATRIAL FIBRILLATION (HCC): Status: ACTIVE | Noted: 2017-12-08

## 2017-12-08 PROBLEM — N18.30 CKD (CHRONIC KIDNEY DISEASE), STAGE III: Status: ACTIVE | Noted: 2017-12-08

## 2017-12-08 PROBLEM — R33.9 BLADDER RETENTION OF URINE: Status: ACTIVE | Noted: 2017-12-08

## 2017-12-08 LAB
EKG IMPRESSION: NORMAL
T4 FREE SERPL-MCNC: 1.15 NG/DL (ref 0.53–1.43)
TSH SERPL DL<=0.005 MIU/L-ACNC: 3.35 UIU/ML (ref 0.3–3.7)

## 2017-12-08 PROCEDURE — 88305 TISSUE EXAM BY PATHOLOGIST: CPT

## 2017-12-08 PROCEDURE — 160002 HCHG RECOVERY MINUTES (STAT): Performed by: UROLOGY

## 2017-12-08 PROCEDURE — 160035 HCHG PACU - 1ST 60 MINS PHASE I: Performed by: UROLOGY

## 2017-12-08 PROCEDURE — 0VT08ZZ RESECTION OF PROSTATE, VIA NATURAL OR ARTIFICIAL OPENING ENDOSCOPIC: ICD-10-PCS | Performed by: UROLOGY

## 2017-12-08 PROCEDURE — 84443 ASSAY THYROID STIM HORMONE: CPT

## 2017-12-08 PROCEDURE — 160048 HCHG OR STATISTICAL LEVEL 1-5: Performed by: UROLOGY

## 2017-12-08 PROCEDURE — 700111 HCHG RX REV CODE 636 W/ 250 OVERRIDE (IP)

## 2017-12-08 PROCEDURE — 700101 HCHG RX REV CODE 250

## 2017-12-08 PROCEDURE — A9270 NON-COVERED ITEM OR SERVICE: HCPCS | Performed by: INTERNAL MEDICINE

## 2017-12-08 PROCEDURE — 36415 COLL VENOUS BLD VENIPUNCTURE: CPT

## 2017-12-08 PROCEDURE — 160028 HCHG SURGERY MINUTES - 1ST 30 MINS LEVEL 3: Performed by: UROLOGY

## 2017-12-08 PROCEDURE — 700102 HCHG RX REV CODE 250 W/ 637 OVERRIDE(OP): Performed by: UROLOGY

## 2017-12-08 PROCEDURE — 700111 HCHG RX REV CODE 636 W/ 250 OVERRIDE (IP): Performed by: UROLOGY

## 2017-12-08 PROCEDURE — 700101 HCHG RX REV CODE 250: Performed by: INTERNAL MEDICINE

## 2017-12-08 PROCEDURE — 500879 HCHG PACK, CYSTO: Performed by: UROLOGY

## 2017-12-08 PROCEDURE — A4346 CATH INDW FOLEY 3 WAY: HCPCS | Performed by: UROLOGY

## 2017-12-08 PROCEDURE — 700105 HCHG RX REV CODE 258: Performed by: INTERNAL MEDICINE

## 2017-12-08 PROCEDURE — 700102 HCHG RX REV CODE 250 W/ 637 OVERRIDE(OP)

## 2017-12-08 PROCEDURE — 99223 1ST HOSP IP/OBS HIGH 75: CPT | Performed by: INTERNAL MEDICINE

## 2017-12-08 PROCEDURE — A9270 NON-COVERED ITEM OR SERVICE: HCPCS

## 2017-12-08 PROCEDURE — 700102 HCHG RX REV CODE 250 W/ 637 OVERRIDE(OP): Performed by: INTERNAL MEDICINE

## 2017-12-08 PROCEDURE — 501329 HCHG SET, CYSTO IRRIG Y TUR: Performed by: UROLOGY

## 2017-12-08 PROCEDURE — 770020 HCHG ROOM/CARE - TELE (206)

## 2017-12-08 PROCEDURE — 93010 ELECTROCARDIOGRAM REPORT: CPT | Performed by: INTERNAL MEDICINE

## 2017-12-08 PROCEDURE — 500509 HCHG ELECTRODE, ROLLER: Performed by: UROLOGY

## 2017-12-08 PROCEDURE — 160036 HCHG PACU - EA ADDL 30 MINS PHASE I: Performed by: UROLOGY

## 2017-12-08 PROCEDURE — 93005 ELECTROCARDIOGRAM TRACING: CPT | Performed by: ANESTHESIOLOGY

## 2017-12-08 PROCEDURE — 700105 HCHG RX REV CODE 258: Performed by: UROLOGY

## 2017-12-08 PROCEDURE — 160009 HCHG ANES TIME/MIN: Performed by: UROLOGY

## 2017-12-08 PROCEDURE — A9270 NON-COVERED ITEM OR SERVICE: HCPCS | Performed by: UROLOGY

## 2017-12-08 PROCEDURE — 160039 HCHG SURGERY MINUTES - EA ADDL 1 MIN LEVEL 3: Performed by: UROLOGY

## 2017-12-08 PROCEDURE — 84439 ASSAY OF FREE THYROXINE: CPT

## 2017-12-08 RX ORDER — SODIUM CHLORIDE, SODIUM LACTATE, POTASSIUM CHLORIDE, CALCIUM CHLORIDE 600; 310; 30; 20 MG/100ML; MG/100ML; MG/100ML; MG/100ML
INJECTION, SOLUTION INTRAVENOUS CONTINUOUS
Status: DISCONTINUED | OUTPATIENT
Start: 2017-12-08 | End: 2017-12-08

## 2017-12-08 RX ORDER — AMOXICILLIN 250 MG
2 CAPSULE ORAL 2 TIMES DAILY
Status: DISCONTINUED | OUTPATIENT
Start: 2017-12-08 | End: 2017-12-09 | Stop reason: HOSPADM

## 2017-12-08 RX ORDER — OXYCODONE HYDROCHLORIDE 5 MG/1
5 TABLET ORAL
Status: DISCONTINUED | OUTPATIENT
Start: 2017-12-08 | End: 2017-12-09 | Stop reason: HOSPADM

## 2017-12-08 RX ORDER — OXYCODONE HYDROCHLORIDE 10 MG/1
10 TABLET ORAL
Status: DISCONTINUED | OUTPATIENT
Start: 2017-12-08 | End: 2017-12-09 | Stop reason: HOSPADM

## 2017-12-08 RX ORDER — FINASTERIDE 5 MG/1
5 TABLET, FILM COATED ORAL EVERY EVENING
Status: DISCONTINUED | OUTPATIENT
Start: 2017-12-08 | End: 2017-12-09 | Stop reason: HOSPADM

## 2017-12-08 RX ORDER — LIDOCAINE HYDROCHLORIDE 10 MG/ML
INJECTION, SOLUTION INFILTRATION; PERINEURAL
Status: COMPLETED
Start: 2017-12-08 | End: 2017-12-08

## 2017-12-08 RX ORDER — AMOXICILLIN 250 MG
2 CAPSULE ORAL
Status: DISCONTINUED | OUTPATIENT
Start: 2017-12-08 | End: 2017-12-09 | Stop reason: HOSPADM

## 2017-12-08 RX ORDER — LIDOCAINE AND PRILOCAINE 25; 25 MG/G; MG/G
1 CREAM TOPICAL
Status: ACTIVE | OUTPATIENT
Start: 2017-12-08 | End: 2017-12-09

## 2017-12-08 RX ORDER — SIMETHICONE 80 MG
80 TABLET,CHEWABLE ORAL EVERY 8 HOURS PRN
Status: DISCONTINUED | OUTPATIENT
Start: 2017-12-08 | End: 2017-12-09 | Stop reason: HOSPADM

## 2017-12-08 RX ORDER — ACETAMINOPHEN 325 MG/1
650 TABLET ORAL EVERY 6 HOURS PRN
Status: DISCONTINUED | OUTPATIENT
Start: 2017-12-08 | End: 2017-12-09 | Stop reason: HOSPADM

## 2017-12-08 RX ORDER — AMLODIPINE BESYLATE 10 MG/1
10 TABLET ORAL DAILY
Status: DISCONTINUED | OUTPATIENT
Start: 2017-12-09 | End: 2017-12-09

## 2017-12-08 RX ORDER — HYDROMORPHONE HYDROCHLORIDE 2 MG/ML
0.2 INJECTION, SOLUTION INTRAMUSCULAR; INTRAVENOUS; SUBCUTANEOUS
Status: DISCONTINUED | OUTPATIENT
Start: 2017-12-08 | End: 2017-12-09 | Stop reason: HOSPADM

## 2017-12-08 RX ORDER — SODIUM CHLORIDE, SODIUM LACTATE, POTASSIUM CHLORIDE, CALCIUM CHLORIDE 600; 310; 30; 20 MG/100ML; MG/100ML; MG/100ML; MG/100ML
INJECTION, SOLUTION INTRAVENOUS CONTINUOUS
Status: DISCONTINUED | OUTPATIENT
Start: 2017-12-08 | End: 2017-12-09 | Stop reason: HOSPADM

## 2017-12-08 RX ORDER — OXYCODONE HCL 5 MG/5 ML
SOLUTION, ORAL ORAL
Status: COMPLETED
Start: 2017-12-08 | End: 2017-12-08

## 2017-12-08 RX ORDER — DOXAZOSIN MESYLATE 1 MG/1
1 TABLET ORAL
Status: DISCONTINUED | OUTPATIENT
Start: 2017-12-08 | End: 2017-12-09 | Stop reason: HOSPADM

## 2017-12-08 RX ORDER — OMEPRAZOLE 20 MG/1
20 CAPSULE, DELAYED RELEASE ORAL PRN
Status: DISCONTINUED | OUTPATIENT
Start: 2017-12-08 | End: 2017-12-09 | Stop reason: HOSPADM

## 2017-12-08 RX ORDER — ATROPA BELLADONNA AND OPIUM 16.2; 6 MG/1; MG/1
60 SUPPOSITORY RECTAL EVERY 12 HOURS PRN
Status: DISCONTINUED | OUTPATIENT
Start: 2017-12-08 | End: 2017-12-09 | Stop reason: HOSPADM

## 2017-12-08 RX ORDER — ONDANSETRON 2 MG/ML
4 INJECTION INTRAMUSCULAR; INTRAVENOUS EVERY 6 HOURS PRN
Status: DISCONTINUED | OUTPATIENT
Start: 2017-12-08 | End: 2017-12-09 | Stop reason: HOSPADM

## 2017-12-08 RX ORDER — SODIUM CHLORIDE 9 MG/ML
500 INJECTION, SOLUTION INTRAVENOUS ONCE
Status: COMPLETED | OUTPATIENT
Start: 2017-12-08 | End: 2017-12-08

## 2017-12-08 RX ORDER — LIDOCAINE HYDROCHLORIDE 10 MG/ML
0.5 INJECTION, SOLUTION INFILTRATION; PERINEURAL
Status: ACTIVE | OUTPATIENT
Start: 2017-12-08 | End: 2017-12-09

## 2017-12-08 RX ORDER — BISACODYL 10 MG
10 SUPPOSITORY, RECTAL RECTAL
Status: DISCONTINUED | OUTPATIENT
Start: 2017-12-08 | End: 2017-12-09 | Stop reason: HOSPADM

## 2017-12-08 RX ORDER — DIPHENHYDRAMINE HCL 25 MG
25 TABLET ORAL NIGHTLY PRN
Status: DISCONTINUED | OUTPATIENT
Start: 2017-12-08 | End: 2017-12-09 | Stop reason: HOSPADM

## 2017-12-08 RX ORDER — POLYETHYLENE GLYCOL 3350 17 G/17G
1 POWDER, FOR SOLUTION ORAL
Status: DISCONTINUED | OUTPATIENT
Start: 2017-12-08 | End: 2017-12-09 | Stop reason: HOSPADM

## 2017-12-08 RX ORDER — METOPROLOL TARTRATE 1 MG/ML
5 INJECTION, SOLUTION INTRAVENOUS
Status: DISCONTINUED | OUTPATIENT
Start: 2017-12-08 | End: 2017-12-09 | Stop reason: HOSPADM

## 2017-12-08 RX ADMIN — CEFAZOLIN SODIUM 1 G: 1 INJECTION, SOLUTION INTRAVENOUS at 22:00

## 2017-12-08 RX ADMIN — DOXAZOSIN MESYLATE 1 MG: 1 TABLET ORAL at 22:00

## 2017-12-08 RX ADMIN — LIDOCAINE HYDROCHLORIDE: 10 INJECTION, SOLUTION INFILTRATION; PERINEURAL at 06:30

## 2017-12-08 RX ADMIN — SODIUM CHLORIDE, SODIUM LACTATE, POTASSIUM CHLORIDE, CALCIUM CHLORIDE: 600; 310; 30; 20 INJECTION, SOLUTION INTRAVENOUS at 06:45

## 2017-12-08 RX ADMIN — SODIUM CHLORIDE, POTASSIUM CHLORIDE, SODIUM LACTATE AND CALCIUM CHLORIDE: 600; 310; 30; 20 INJECTION, SOLUTION INTRAVENOUS at 15:14

## 2017-12-08 RX ADMIN — FINASTERIDE 5 MG: 5 TABLET, FILM COATED ORAL at 22:00

## 2017-12-08 RX ADMIN — OXYCODONE HYDROCHLORIDE 10 MG: 5 SOLUTION ORAL at 10:50

## 2017-12-08 RX ADMIN — METOROPROLOL TARTRATE 5 MG: 5 INJECTION, SOLUTION INTRAVENOUS at 17:22

## 2017-12-08 RX ADMIN — METOPROLOL TARTRATE 25 MG: 25 TABLET, FILM COATED ORAL at 13:17

## 2017-12-08 RX ADMIN — SODIUM CHLORIDE 500 ML: 9 INJECTION, SOLUTION INTRAVENOUS at 21:15

## 2017-12-08 RX ADMIN — CEFAZOLIN SODIUM 1 G: 1 INJECTION, SOLUTION INTRAVENOUS at 15:15

## 2017-12-08 ASSESSMENT — PAIN SCALES - GENERAL
PAINLEVEL_OUTOF10: 6
PAINLEVEL_OUTOF10: ASSUMED PAIN PRESENT
PAINLEVEL_OUTOF10: 1
PAINLEVEL_OUTOF10: ASSUMED PAIN PRESENT
PAINLEVEL_OUTOF10: ASSUMED PAIN PRESENT
PAINLEVEL_OUTOF10: 6
PAINLEVEL_OUTOF10: 0
PAINLEVEL_OUTOF10: ASSUMED PAIN PRESENT
PAINLEVEL_OUTOF10: ASSUMED PAIN PRESENT
PAINLEVEL_OUTOF10: 5
PAINLEVEL_OUTOF10: 6
PAINLEVEL_OUTOF10: 5
PAINLEVEL_OUTOF10: 6
PAINLEVEL_OUTOF10: ASSUMED PAIN PRESENT

## 2017-12-08 ASSESSMENT — COGNITIVE AND FUNCTIONAL STATUS - GENERAL
DRESSING REGULAR UPPER BODY CLOTHING: A LITTLE
MOBILITY SCORE: 20
STANDING UP FROM CHAIR USING ARMS: A LITTLE
PERSONAL GROOMING: A LITTLE
TOILETING: A LITTLE
SUGGESTED CMS G CODE MODIFIER MOBILITY: CJ
DRESSING REGULAR LOWER BODY CLOTHING: A LITTLE
WALKING IN HOSPITAL ROOM: A LITTLE
HELP NEEDED FOR BATHING: A LITTLE
DAILY ACTIVITIY SCORE: 19
CLIMB 3 TO 5 STEPS WITH RAILING: A LITTLE
SUGGESTED CMS G CODE MODIFIER DAILY ACTIVITY: CK
MOVING FROM LYING ON BACK TO SITTING ON SIDE OF FLAT BED: A LITTLE

## 2017-12-08 ASSESSMENT — ENCOUNTER SYMPTOMS
ABDOMINAL PAIN: 0
EYE REDNESS: 0
NECK PAIN: 0
EYE DISCHARGE: 0
NAUSEA: 0
VOMITING: 0
FEVER: 0
NERVOUS/ANXIOUS: 0
BACK PAIN: 0
STRIDOR: 0
FOCAL WEAKNESS: 0
BLURRED VISION: 0
ORTHOPNEA: 0
DIARRHEA: 0
HEARTBURN: 0
SHORTNESS OF BREATH: 0
PALPITATIONS: 0
HEADACHES: 0
EYE PAIN: 0
MYALGIAS: 0
SEIZURES: 0
INSOMNIA: 0
DIZZINESS: 0
COUGH: 0
CHILLS: 0
WEIGHT LOSS: 0
SPUTUM PRODUCTION: 0
DEPRESSION: 0

## 2017-12-08 ASSESSMENT — PATIENT HEALTH QUESTIONNAIRE - PHQ9
SUM OF ALL RESPONSES TO PHQ QUESTIONS 1-9: 0
1. LITTLE INTEREST OR PLEASURE IN DOING THINGS: NOT AT ALL
SUM OF ALL RESPONSES TO PHQ9 QUESTIONS 1 AND 2: 0
2. FEELING DOWN, DEPRESSED, IRRITABLE, OR HOPELESS: NOT AT ALL

## 2017-12-08 ASSESSMENT — LIFESTYLE VARIABLES
EVER_SMOKED: YES
ALCOHOL_USE: NO

## 2017-12-08 NOTE — OR SURGEON
Immediate Post OP Note    PreOp Diagnosis: BPH with urinary retention    PostOp Diagnosis: same    Procedure(s):  TRANS URETHRAL RESECTION PROSTATE - Wound Class: Clean Contaminated    Surgeon(s):  Jonny Chávez M.D.    Anesthesiologist/Type of Anesthesia:  Anesthesiologist: Eulalio Ramos M.D./General    Surgical Staff:  Circulator: Julia Hurtado R.N.  Relief Circulator: Brian Villarreal R.N.  Relief Scrub: Tory Del Rosario  Scrub Person: Danish Negron    Specimens:  TUR chips    Estimated Blood Loss: ?    Findings: bph    Complications: none        12/8/2017 10:07 AM Jonny Chávez

## 2017-12-08 NOTE — OR NURSING
Pt arrived to PACU with LMA in place. Oxygen at 4 L via blowby. All monitors connected. Report received from anesthesia and OR. 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 Monitor shows 's and an irregular rhythm. Received orders for STAT EKG and hospitalist consult. EKG confirms Afib. Awaiting hospitalist.

## 2017-12-08 NOTE — PROGRESS NOTES
The Medication Reconciliation process has been completed by interviewing the patient    Allergies have been reviewed  Antibiotic use in 30 days - none    Home Pharmacy:  [facility] [place]

## 2017-12-08 NOTE — OR NURSING
Call out to 's P.A., Prema, to clarify if okay to administer weight based Lovenox that is scheduled. Per Prema, hold dose of Lovenox and she will discuss with . Prema will call receiving RNFredy, with an answer. Will notify LEWIS Daniel.

## 2017-12-08 NOTE — PROGRESS NOTES
Pt admitted to T804-1 at 1400. Pt oriented to room, unit, and plan of care. Tele-monitor placed and monitor room notified. All questions answered at this time. Call light within reach; fall precautions in place.

## 2017-12-08 NOTE — CONSULTS
Hospital Medicine History and Physical      Date of Service  12/8/2017      CC: admitted for TURP    Consulting physician: Dr. Chávez    Reason for consult: Afib new onset    History of Presenting Illness  Joann is a 62 y.o. male PMH of BPH, who was here for TURP for his BPH. During the procedure he developed into atrial fibrillation and hypotension. His HR was in 130-150. He was given IV diltiazem and his HR was able to come down to around 100. No history of afib. The patient denies chest pain or palpitation, sob. He will be admitted to tele floor.    Primary Care Physician  Collins Pro D.O.      Code Status  Full code    Review of Systems  Review of Systems   Constitutional: Negative for chills, fever and weight loss.   HENT: Negative for congestion and nosebleeds.    Eyes: Negative for blurred vision, pain, discharge and redness.   Respiratory: Negative for cough, sputum production, shortness of breath and stridor.    Cardiovascular: Negative for chest pain, palpitations and orthopnea.   Gastrointestinal: Negative for abdominal pain, diarrhea, heartburn, nausea and vomiting.   Genitourinary: Negative for dysuria, frequency and urgency.   Musculoskeletal: Negative for back pain, myalgias and neck pain.   Skin: Negative for itching and rash.   Neurological: Negative for dizziness, focal weakness, seizures and headaches.   Psychiatric/Behavioral: Negative for depression. The patient is not nervous/anxious and does not have insomnia.      Please see HPI, all other systems were reviewed and are negative (AMA/CMS criteria)     Past Medical History  Past Medical History:   Diagnosis Date   • Hepatitis C 1980   • Bowel habit changes     CONSTIPATION   • Disorder of thyroid     AS A CHILD   • Hypertension    • Indigestion    • Psychiatric problem    • Renal disorder     KIDNEY FAILURE EARLY PT.   • Urinary bladder disorder     ENLARGED PROSTATE       Surgical History  Past Surgical History:   Procedure Laterality Date    • INGUINAL HERNIA REPAIR CHILD  as child       Medications  No current facility-administered medications on file prior to encounter.      Current Outpatient Prescriptions on File Prior to Encounter   Medication Sig Dispense Refill   • amlodipine (NORVASC) 10 MG Tab Take 1 Tab by mouth every day. 30 Tab 3   • doxazosin (CARDURA) 1 MG Tab Take 1 Tab by mouth every bedtime. 30 Tab 2   • lansoprazole (PREVACID) 30 MG CAPSULE DELAYED RELEASE Take 30 mg by mouth as needed.       Family History  History reviewed. No pertinent family history.      Social History  Social History   Substance Use Topics   • Smoking status: Current Some Day Smoker     Packs/day: 0.25   • Smokeless tobacco: Never Used   • Alcohol use No       Allergies  No Known Allergies     Physical Exam  Laboratory   Hemodynamics  Temp (24hrs), Av.6 °C (97.8 °F), Min:36.3 °C (97.4 °F), Max:36.7 °C (98.1 °F)   Temperature: 36.3 °C (97.4 °F)  Pulse  Av  Min: 116  Max: 116 Heart Rate (Monitored): (!) 120  Blood Pressure: 134/88, NIBP: 117/79      Respiratory      Respiration: 14, Pulse Oximetry: 98 %             Physical Exam   Constitutional: He is oriented to person, place, and time. No distress.   HENT:   Head: Normocephalic and atraumatic.   Mouth/Throat: Oropharynx is clear and moist.   Eyes: Conjunctivae and EOM are normal. Pupils are equal, round, and reactive to light.   Neck: Normal range of motion. Neck supple. No tracheal deviation present. No thyromegaly present.   Cardiovascular:   No murmur heard.  afib   Pulmonary/Chest: Effort normal and breath sounds normal. No respiratory distress. He has no wheezes.   Abdominal: Soft. Bowel sounds are normal. He exhibits no distension. There is no tenderness.   Musculoskeletal: He exhibits no edema or tenderness.   Neurological: He is alert and oriented to person, place, and time. No cranial nerve deficit.   Skin: Skin is warm and dry. He is not diaphoretic. No erythema.   Psychiatric: He has a  normal mood and affect. His behavior is normal. Thought content normal.               No results for input(s): ALTSGPT, ASTSGOT, ALKPHOSPHAT, TBILIRUBIN, DBILIRUBIN, GAMMAGT, AMYLASE, LIPASE, ALB, PREALBUMIN, GLUCOSE in the last 72 hours.              No results found for: TROPONINI    Imaging  Echocardiogram Comp W/O Cont    (Results Pending)     EKG  per my independant read:  QTc: 498, HR: 132, afib     Assessment/Plan     I anticipate this patient will require at least two midnights for appropriate medical management, necessitating inpatient admission.    New onset atrial fibrillation (CMS-HCC)- (present on admission)   Assessment & Plan    Currently rate control  Started on metoprolol 25 mg bid  Check TSH, Free T4  Check Echo  IV metoprolol for prn with parameters  To consult cardiology for before discharge  Started on IV lovenox        CKD (chronic kidney disease), stage III- (present on admission)   Assessment & Plan    Avoid nephrotoxic drug  Follow cmp in am        Bladder retention of urine- (present on admission)   Assessment & Plan    From BPH post TURP by Dr. Chávez  Pain management              Prophylaxis:  lovenox

## 2017-12-08 NOTE — OP REPORT
DATE OF SERVICE:  12/08/2017    PREOPERATIVE DIAGNOSIS:  Benign prostatic hyperplasia with urinary retention.    POSTOPERATIVE DIAGNOSIS:  Benign prostatic hyperplasia with urinary retention.    PROCEDURE:  Transurethral resection of prostate.    SURGEON:  Jonny Chávez MD.    ASSISTANT:  None.    ANESTHESIOLOGIST:  Eulalio Ramos MD.    TYPE OF ANESTHESIA:  General.    INDICATIONS:  Benign prostatic hypertrophy with urinary retention.    FINDINGS:  BPH.    DESCRIPTION OF PROCEDURE:  The patient was identified in the holding area.  He   was taken to the operative suite.  General anesthesia was administered by Dr. Ramos.  Cefazolin and gentamicin was given intravenously.  He was then   sterilely prepped and draped in the dorsal lithotomy position.  Time out was   called.  Correct patient and site of surgery was confirmed.    A 26-Slovak continuous flow resectoscope was introduced into the patient's   bladder under direct vision using a visual obturator.  No urethral stricture   was noted.  The prostate was moderately enlarged.    Using a loop electrode, the prostate was resected from the bladder neck to the   verumontanum circumferentially.  TUR chips were collected.  The prostatic   fossa was coagulated with a loop electrode and rollerball.  Bladder was   inspected to make sure that no residual chips were retained.    He did have what appeared to be a venous sinus opening on the left lateral   wall of the prostate, which continued to ooze.  A 22-Sl[place] 3-way [place]   catheter was placed to gravity drainage and 45 mL of sterile water placed into   the balloon.  This was placed on traction.  This resolved all bleeding.  The   bladder was irrigated freely.  The glycine irrigation was attached and it ran   crystal clear.  Traction will be maintained in the recovery room.       ____________________________________     MD FORREST CHAHAL / NTS    DD:  12/08/2017 10:17:37  DT:  12/08/2017 10:31:28    D#:   [id number]  Job#:  [id number]

## 2017-12-09 ENCOUNTER — PATIENT OUTREACH (OUTPATIENT)
Dept: HEALTH INFORMATION MANAGEMENT | Facility: OTHER | Age: 62
End: 2017-12-09

## 2017-12-09 VITALS
TEMPERATURE: 97.6 F | RESPIRATION RATE: 18 BRPM | OXYGEN SATURATION: 95 % | DIASTOLIC BLOOD PRESSURE: 77 MMHG | BODY MASS INDEX: 36.39 KG/M2 | SYSTOLIC BLOOD PRESSURE: 90 MMHG | HEIGHT: 68 IN | HEART RATE: 106 BPM | WEIGHT: 240.08 LBS

## 2017-12-09 PROBLEM — B19.20 HEPATITIS C: Status: ACTIVE | Noted: 2017-12-09

## 2017-12-09 LAB
ANION GAP SERPL CALC-SCNC: 6 MMOL/L (ref 0–11.9)
BUN SERPL-MCNC: 17 MG/DL (ref 8–22)
CALCIUM SERPL-MCNC: 8 MG/DL (ref 8.5–10.5)
CHLORIDE SERPL-SCNC: 109 MMOL/L (ref 96–112)
CO2 SERPL-SCNC: 23 MMOL/L (ref 20–33)
CREAT SERPL-MCNC: 1.3 MG/DL (ref 0.5–1.4)
ERYTHROCYTE [DISTWIDTH] IN BLOOD BY AUTOMATED COUNT: 48.9 FL (ref 35.9–50)
GFR SERPL CREATININE-BSD FRML MDRD: 55 ML/MIN/1.73 M 2
GLUCOSE SERPL-MCNC: 115 MG/DL (ref 65–99)
HCT VFR BLD AUTO: 37.8 % (ref 42–52)
HGB BLD-MCNC: 12.1 G/DL (ref 14–18)
LV EJECT FRACT  99904: 45
LV EJECT FRACT MOD 2C 99903: 49.41
LV EJECT FRACT MOD 4C 99902: 40.99
LV EJECT FRACT MOD BP 99901: 47.48
MAGNESIUM SERPL-MCNC: 1.8 MG/DL (ref 1.5–2.5)
MCH RBC QN AUTO: 28.3 PG (ref 27–33)
MCHC RBC AUTO-ENTMCNC: 32 G/DL (ref 33.7–35.3)
MCV RBC AUTO: 88.5 FL (ref 81.4–97.8)
PLATELET # BLD AUTO: 225 K/UL (ref 164–446)
PMV BLD AUTO: 9.5 FL (ref 9–12.9)
POTASSIUM SERPL-SCNC: 4.2 MMOL/L (ref 3.6–5.5)
RBC # BLD AUTO: 4.27 M/UL (ref 4.7–6.1)
SODIUM SERPL-SCNC: 138 MMOL/L (ref 135–145)
WBC # BLD AUTO: 6.4 K/UL (ref 4.8–10.8)

## 2017-12-09 PROCEDURE — 700102 HCHG RX REV CODE 250 W/ 637 OVERRIDE(OP): Performed by: INTERNAL MEDICINE

## 2017-12-09 PROCEDURE — 83735 ASSAY OF MAGNESIUM: CPT

## 2017-12-09 PROCEDURE — 36415 COLL VENOUS BLD VENIPUNCTURE: CPT

## 2017-12-09 PROCEDURE — 99233 SBSQ HOSP IP/OBS HIGH 50: CPT | Performed by: INTERNAL MEDICINE

## 2017-12-09 PROCEDURE — A9270 NON-COVERED ITEM OR SERVICE: HCPCS | Performed by: INTERNAL MEDICINE

## 2017-12-09 PROCEDURE — 93306 TTE W/DOPPLER COMPLETE: CPT | Mod: 26 | Performed by: INTERNAL MEDICINE

## 2017-12-09 PROCEDURE — 700102 HCHG RX REV CODE 250 W/ 637 OVERRIDE(OP): Performed by: UROLOGY

## 2017-12-09 PROCEDURE — 85027 COMPLETE CBC AUTOMATED: CPT

## 2017-12-09 PROCEDURE — 80048 BASIC METABOLIC PNL TOTAL CA: CPT

## 2017-12-09 PROCEDURE — 700105 HCHG RX REV CODE 258: Performed by: UROLOGY

## 2017-12-09 PROCEDURE — 700111 HCHG RX REV CODE 636 W/ 250 OVERRIDE (IP): Performed by: UROLOGY

## 2017-12-09 PROCEDURE — A9270 NON-COVERED ITEM OR SERVICE: HCPCS | Performed by: UROLOGY

## 2017-12-09 PROCEDURE — 93306 TTE W/DOPPLER COMPLETE: CPT

## 2017-12-09 RX ORDER — DILTIAZEM HYDROCHLORIDE 120 MG/1
120 CAPSULE, COATED, EXTENDED RELEASE ORAL DAILY
Qty: 30 CAP | Refills: 0 | Status: SHIPPED | OUTPATIENT
Start: 2017-12-10 | End: 2018-05-02

## 2017-12-09 RX ORDER — DILTIAZEM HYDROCHLORIDE 120 MG/1
120 CAPSULE, COATED, EXTENDED RELEASE ORAL DAILY
Status: DISCONTINUED | OUTPATIENT
Start: 2017-12-09 | End: 2017-12-09 | Stop reason: HOSPADM

## 2017-12-09 RX ADMIN — DILTIAZEM HYDROCHLORIDE 120 MG: 120 CAPSULE, COATED, EXTENDED RELEASE ORAL at 15:29

## 2017-12-09 RX ADMIN — CEFAZOLIN SODIUM 1 G: 1 INJECTION, SOLUTION INTRAVENOUS at 05:28

## 2017-12-09 RX ADMIN — METOPROLOL TARTRATE 25 MG: 25 TABLET, FILM COATED ORAL at 09:10

## 2017-12-09 RX ADMIN — SODIUM CHLORIDE, POTASSIUM CHLORIDE, SODIUM LACTATE AND CALCIUM CHLORIDE: 600; 310; 30; 20 INJECTION, SOLUTION INTRAVENOUS at 06:14

## 2017-12-09 RX ADMIN — AMLODIPINE BESYLATE 10 MG: 10 TABLET ORAL at 09:10

## 2017-12-09 ASSESSMENT — PATIENT HEALTH QUESTIONNAIRE - PHQ9
1. LITTLE INTEREST OR PLEASURE IN DOING THINGS: NOT AT ALL
SUM OF ALL RESPONSES TO PHQ9 QUESTIONS 1 AND 2: 0
SUM OF ALL RESPONSES TO PHQ QUESTIONS 1-9: 0
2. FEELING DOWN, DEPRESSED, IRRITABLE, OR HOPELESS: NOT AT ALL

## 2017-12-09 ASSESSMENT — PAIN SCALES - GENERAL
PAINLEVEL_OUTOF10: 0
PAINLEVEL_OUTOF10: 0

## 2017-12-09 NOTE — PROGRESS NOTES
Norma Whyte Fall Risk Assessment:     Last Known Fall: No falls  Mobility: Immobilized/requires assist of one person  Medications: Cardiovascular or central nervous system meds  Mental Status/LOC/Awareness: Awake, alert, and oriented to date, place, and person  Toileting Needs: Use of catheters or diversion devices  Volume/Electrolyte Status: Use of IV fluids/tube feeds  Communication/Sensory: Visual (Glasses)/hearing deficit  Behavior: Appropriate behavior  Norma Whyte Fall Risk Total: 10  Fall Risk Level: LOW RISK    Universal Fall Precautions:  call light/belongings in reach, bed in low position and locked, siderails up x 2, adequate lighting, use non-slip footwear, clutter free and spill free environment, educate on level of risk, educate to call for assistance    Fall Risk Level Interventions:   TRIAL (TELE 8, NEURO, MED DIANA 5) Low Fall Risk Interventions  Place yellow fall risk ID band on patient: verified  Provide patient/family education based on risk assessment: completed  Educate patient/family to call staff for assistance when getting out of bed: completed  Place fall precaution signage outside patient door: verifiedTRIAL ([employer], NEURO, MED DIANA 5) Moderate Fall Risk Interventions  Place yellow fall risk ID band on patient: completed  Provide patient/family education based on risk assessment : completed  Educate patient/family to call staff for assistance when getting out of bed: completed  Place fall precaution signage outside patient door: completed  Utilize bed/chair fall alarm: completed     Patient Specific Interventions:     Medication: limit combination of prn medications  Mental Status/LOC/Awareness: not applicable  Toileting: not applicable  Volume/Electrolyte Status: ensure IV fluids are appropriate  Communication/Sensory: not applicable  Behavioral: not applicable  Mobility: dangle prior to standing and ensure bed is locked and in lowest position

## 2017-12-09 NOTE — CONSULTS
DATE OF SERVICE:  12/09/2017    REFERRING PHYSICIAN:  August Allen MD    CHIEF COMPLAINT:  Atrial fibrillation.    HISTORY OF PRESENT ILLNESS:  The patient is a 62-year-old male with past   medical history significant for hypertension.  He underwent TURP yesterday.    Patient apparently went into atrial fibrillation yesterday; however, he was   not consulted until today.  He remains in atrial fibrillation.  He is   clinically doing well without chest pain, shortness of breath, nausea,   vomiting or diaphoresis.    ALLERGIES:  No known drug allergies.    MEDICATIONS:  Amlodipine 10 mg per day, Rocephin IV daily, Cardura 1 mg daily,   Proscar 5 mg daily, metoprolol 25 mg b.i.d.    PAST MEDICAL ILLNESS:  As above plus chronic renal disease, hepatitis C.    PAST SURGICAL HISTORY:  As above plus hernia repair.    SOCIAL HISTORY:  He is , tobacco positive.    REVIEW OF SYSTEMS:  Positive for obstructive uropathy, currently catheter in   Place.  GENERAL: The patient denies fatigue, fever, chills or weight changes.   HEENT: The patient denies headache, visual or hearing changes.   CENTRAL NERVOUS SYSTEM: The patient denies lightheadedness,   syncope or seizures.   ENDOCRINE: The patient denies thyroid disorder or diabetes.   RESPIRATORY: The patient denies productive cough, asthma or emphysema.  CARDIOVASCULAR: As above.   GASTROINTESTINAL: The patient denies bowel changes.   GENITOURINARY: As above.  PSYCHIATRIC: The patient denies depression or anxiety.   EXTREMITIES: The patient denies arthritis.    PHYSICAL EXAMINATION:  VITAL SIGNS:  Pulse 127, respiration 18, /78, temperature 36.6.  GENERAL:  No acute distress.  HEENT:  Eyes equal, oral moist.  NECK:  Supple.  RESPIRATORY:  Clear to auscultation bilaterally.  Good effort.  CARDIOVASCULAR:  S1, S2 irregularly irregular without significant murmur.  ABDOMEN:  Soft, nondistended, nontender.  No hepatosplenomegaly noted.  EXTREMITIES:  No clubbing,  cyanosis.  Lower extremity, no edema.  NEUROLOGIC:  Alert and oriented x3.  PSYCHIATRIC:  No anxiety or depression.  SKIN:  Warm and dry.    CARDIAC STUDIES/PROCEDURES:    EKG performed on (1208/17) was reviewed: EKG shows atrial fibrillation.    Laboratory results of (12/09/17) were reviewed. Bun of 17 mg/dl, creatinine levels of 1.3 mg/dl noted.    ASSESSMENT AND PLAN:  1.  New onset atrial fibrillation greater than 24-hour duration:  The patient   is a 62-year-old male with history of hypertension.  He underwent successful   TURP yesterday.  He was found to be in atrial fibrillation.  He has been in   atrial fibrillation greater than 24 hours.  We will not continue with rate   control.  We will continue with metoprolol and exchange amlodipine to   diltiazem.  He will follow up in our office with electrophysiologist.  2.  Hypertension:  Blood pressure has been low.  3.  Status post transurethral resection of prostate:  We will avoid chronic   anticoagulation therapy for his recent procedure.       ____________________________________     MD BARBI LIANG / LISBET    DD:  12/09/2017 10:48:04  DT:  12/09/2017 11:30:16    D#:  [id number]  Job#:  [id number]

## 2017-12-09 NOTE — CARE PLAN
Problem: Communication  Goal: The ability to communicate needs accurately and effectively will improve  Outcome: PROGRESSING AS EXPECTED  Pt is oriented to the unit and has been educated on the use of the call light, pt verbalizes understanding

## 2017-12-09 NOTE — ASSESSMENT & PLAN NOTE
- likely from stress of surgery. TSH WNL. Persistent.  - continue metoprolol PO, maintaining rate control.  - I called cardiology to consult for further inputs.   - CHADS2=1. Ideally should be on at least ASA, but will defer for now until cleared by urology given recent TURP and risk of bleeding.   - await echo. Check Mg level.

## 2017-12-09 NOTE — ASSESSMENT & PLAN NOTE
- may have component of post-renal obstruction. Improved post TURP.   - continue IVF. Repeat BMP in AM.  - avoid nephrotoxins, and continue to renally dose all medications.

## 2017-12-09 NOTE — RESPIRATORY CARE
COPD EDUCATION by COPD CLINICAL EDUCATOR  12/9/2017 at 6:41 AM by Debbie Kline     Patient reviewed by COPD education team. Patient does not qualify for COPD program.

## 2017-12-09 NOTE — PROGRESS NOTES
[facility] Hospitalist Progress Note    Date of Service: 2017    Chief Complaint  62 y.o. male with HTN, hepatitis C, admitted by the urology service on 2017 with elective TURP for BPH and urinary retention. During the procedure, developed afib with RVR and hypotension. Given IV cardizem, which controlled HR. Hospitalist consulted.     Interval Problem Update  2017 - no overnight events. Remains hemodynamically stable and afebrile, although BP soft. Stable on RA. Rate controlled.  Hgb stable. No leukocytosis. Crea improved to 1.30. Started on metoprolol. Remains in afib, rate controlled.     > Seen and examined. Comfortable. No complaints. No CP, SOB, nausea, vomiting. No CP, palpitation. On CBI, pinkish urine with wide open fluids.       Consultants/Specialty  Hospitalist.  Cardiologist    Disposition  Monitor on telemetry.        ROS     Pertinent positives/negatives as mentioned above.     A complete review of systems was done. All other systems were negative.      Physical Exam  Laboratory/Imaging   Hemodynamics  Temp (24hrs), Av.2 °C (97.1 °F), Min:36 °C (96.8 °F), Max:36.4 °C (97.6 °F)   Temperature: 36.1 °C (97 °F)  Pulse  Av.5  Min: 65  Max: 116 Heart Rate (Monitored): 97  Blood Pressure: (!) 90/64 (RN notified), NIBP: 119/87      Respiratory      Respiration: 17, Pulse Oximetry: 95 %     Work Of Breathing / Effort: Mild  RUL Breath Sounds: Clear, RML Breath Sounds: Clear, RLL Breath Sounds: Diminished, JENNIFER Breath Sounds: Clear, LLL Breath Sounds: Diminished    Fluids    Intake/Output Summary (Last 24 hours) at 17 0751  Last data filed at 17 0639   Gross per 24 hour   Intake            23694 ml   Output            93281 ml   Net            -6115 ml       Nutrition  Orders Placed This Encounter   Procedures   • Diet Order     Standing Status:   Standing     Number of Occurrences:   1     Order Specific Question:   Diet:     Answer:   Regular [1]     Physical Exam    Constitutional: He is oriented to person, place, and time. He appears well-developed and well-nourished. No distress.   HENT:   Head: Normocephalic and atraumatic.   Mouth/Throat: Oropharynx is clear and moist. No oropharyngeal exudate.   Eyes: EOM are normal. Pupils are equal, round, and reactive to light. Right eye exhibits no discharge. Left eye exhibits no discharge. No scleral icterus.   Neck: Normal range of motion. Neck supple. No thyromegaly present.   Cardiovascular: Normal rate.  Exam reveals no gallop and no friction rub.    No murmur heard.  Irregular rhythm   Pulmonary/Chest: Effort normal and breath sounds normal. He has no wheezes. He has no rales. He exhibits no tenderness.   Abdominal: Soft. Bowel sounds are normal. He exhibits no distension. There is no tenderness. There is no rebound and no guarding.   Musculoskeletal: Normal range of motion. He exhibits edema ( 1-2+ LE edema). He exhibits no tenderness.   Lymphadenopathy:     He has no cervical adenopathy.   Neurological: He is alert and oriented to person, place, and time. No cranial nerve deficit. Coordination normal.   Skin: Skin is warm and dry. No rash noted. He is not diaphoretic. No erythema.   Psychiatric: He has a normal mood and affect. His behavior is normal. Thought content normal.   Vitals reviewed.       Recent Labs      12/09/17   0144   WBC  6.4   RBC  4.27*   HEMOGLOBIN  12.1*   HEMATOCRIT  37.8*   MCV  88.5   MCH  28.3   MCHC  32.0*   RDW  48.9   PLATELETCT  225   MPV  9.5     Recent Labs      12/09/17   0144   SODIUM  138   POTASSIUM  4.2   CHLORIDE  109   CO2  23   GLUCOSE  115*   BUN  17   CREATININE  1.30   CALCIUM  8.0*                      Assessment/Plan     New onset atrial fibrillation (CMS-HCC)- (present on admission)   Assessment & Plan    - likely from stress of surgery. TSH WNL. Persistent.  - continue metoprolol PO, maintaining rate control.  - I called cardiology to consult for further inputs.   - CHADS2=1.  Ideally should be on at least ASA, but will defer for now until cleared by urology given recent TURP and risk of bleeding.   - await echo. Check Mg level.         Benign prostatic hyperplasia with incomplete bladder emptying- (present on admission)   Assessment & Plan    - s/p TURP.  - defer further management per Urology. Currently on CBI. Continue pain control with IV diludid and PO oxycodone, along with PRN B&O.   - continue cardura.         Hypertension- (present on admission)   Assessment & Plan    - excellent control. Continue metoprolol, norvasc, and cardura. Continue to monitor BP trend.         Hepatitis C- (present on admission)   Assessment & Plan    - outpatient follow-up.         CKD (chronic kidney disease), stage III- (present on admission)   Assessment & Plan    - may have component of post-renal obstruction. Improved post TURP.   - continue IVF. Repeat BMP in AM.  - avoid nephrotoxins, and continue to renally dose all medications.             Reviewed items::  Labs reviewed and Radiology images reviewed  [place] catheter::  Urologic Surgery or Other Surgery on Contiguous Structures of the Genitourinary Tract or Studies  DVT prophylaxis pharmacological::  Contraindicated - High bleeding risk  DVT prophylaxis - mechanical:  SCDs  Ulcer Prophylaxis::  Not indicated

## 2017-12-09 NOTE — CARE PLAN
Problem: Urinary Elimination:  Goal: Ability to reestablish a normal urinary elimination pattern will improve  Outcome: PROGRESSING AS EXPECTED  S/p TURP 12/8, CBI in place, urine red but clear

## 2017-12-09 NOTE — CARE PLAN
Problem: Knowledge Deficit  Goal: Knowledge of disease process/condition, treatment plan, diagnostic tests, and medications will improve  Outcome: PROGRESSING AS EXPECTED  Discussed POC and medications with patient, pt. verbalized understanding.

## 2017-12-09 NOTE — PROGRESS NOTES
Norma Whyte Fall Risk Assessment:     Last Known Fall: No falls  Mobility: Dizziness/generalized weakness  Medications: Cardiovascular or central nervous system meds  Mental Status/LOC/Awareness: Awake, alert, and oriented to date, place, and person  Toileting Needs: Use of assistive device (Bedside commode, bedpan, urinal)  Volume/Electrolyte Status: Use of IV fluids/tube feeds  Communication/Sensory: Visual (Glasses)/hearing deficit  Behavior: Appropriate behavior  Norma Whyte Fall Risk Total: 10  Fall Risk Level: LOW RISK    Universal Fall Precautions:  call light/belongings in reach, bed in low position and locked, wheelchairs and assistive devices out of sight, siderails up x 2, use non-slip footwear, adequate lighting, clutter free and spill free environment, educate on level of risk, educate to call for assistance    Fall Risk Level Interventions:   TRIAL ([employer], NEURO, MED DIANA 5) Low Fall Risk Interventions  Place yellow fall risk ID band on patient: verified  Provide patient/family education based on risk assessment: completed  Educate patient/family to call staff for assistance when getting out of bed: completed  Place fall precaution signage outside patient door: verifiedTRIAL ([employer], NEURO, MED DIANA 5) Moderate Fall Risk Interventions  Place yellow fall risk ID band on patient: completed  Provide patient/family education based on risk assessment : completed  Educate patient/family to call staff for assistance when getting out of bed: completed  Place fall precaution signage outside patient door: completed  Utilize bed/chair fall alarm: completed     Patient Specific Interventions:     Medication: review medications with patient and family  Mental Status/LOC/Awareness: reinforce falls education, check on patient hourly, utilize bed/chair fall alarm and reinforce the use of call light  Toileting: not applicable  Volume/Electrolyte Status: ensure patient remains hydrated, monitor abnormal lab  values and ensure IV fluids are appropriate  Communication/Sensory: update plan of care on whiteboard and ensure patient has glasses/contacts and hearing aids/dentures  Behavioral: encourage patient to voice feelings and instruct/reinforce fall program rationale  Mobility: dangle prior to standing, utilize bed/chair fall alarm, ensure bed is locked and in lowest position and provide appropriate assistive device

## 2017-12-09 NOTE — PROGRESS NOTES
Norma Whyte Fall Risk Assessment:     Last Known Fall: No falls  Mobility: Dizziness/generalized weakness  Medications: Cardiovascular or central nervous system meds  Mental Status/LOC/Awareness: Awake, alert, and oriented to date, place, and person  Toileting Needs: Use of catheters or diversion devices, Use of assistive device (Bedside commode, bedpan, urinal)  Volume/Electrolyte Status: Use of IV fluids/tube feeds  Communication/Sensory: Visual (Glasses)/hearing deficit  Behavior: Appropriate behavior  Norma Whyte Fall Risk Total: 11  Fall Risk Level: MODERATE RISK    Universal Fall Precautions:  call light/belongings in reach, bed in low position and locked, siderails up x 2, use non-slip footwear, adequate lighting, clutter free and spill free environment, educate on level of risk, educate to call for assistance    Fall Risk Level Interventions:    TRIAL (TELE 8, NEURO, MED DIANA 5) Moderate Fall Risk Interventions  Place yellow fall risk ID band on patient: completed  Provide patient/family education based on risk assessment : completed  Educate patient/family to call staff for assistance when getting out of bed: completed  Place fall precaution signage outside patient door: completed  Utilize bed/chair fall alarm: completed     Patient Specific Interventions:     Medication: review medications with patient and family and limit combination of prn medications  Mental Status/LOC/Awareness: check on patient hourly, utilize bed/chair fall alarm and reinforce the use of call light  Toileting: provide frquent toileting  Volume/Electrolyte Status: ensure patient remains hydrated, monitor abnormal lab values and ensure IV fluids are appropriate  Communication/Sensory: update plan of care on whiteboard  Behavioral: not applicable  Mobility: utilize bed/chair fall alarm, ensure bed is locked and in lowest position and provide appropriate assistive device

## 2017-12-09 NOTE — PROGRESS NOTES
Bedside report received by tiffanie Daniel. Patient sitting at edge of bed watching TV at this time. POC discussed, verbalized understanding. No immediate concerns for patient at this time. All safety measures in place.

## 2017-12-09 NOTE — ASSESSMENT & PLAN NOTE
- s/p TURP.  - defer further management per Urology. Currently on CBI. Continue pain control with IV diludid and PO oxycodone, along with PRN B&O.   - continue cardura.

## 2017-12-09 NOTE — PROGRESS NOTES
Assumed care of pt.  Bedside report received and whiteboard updated. Discussed plan of care for the day and answered questions.  Chart and MAR reviewed.  Call light and personal belongings are within reach.  Bed in low position and locked. Pt has no needs at this time.    Dentures: denies  Glasses: at bedside  Hearing aides: denies

## 2017-12-09 NOTE — PROGRESS NOTES
2 RN skin check with [facility]  Scrotal redness and swelling; s/p TURP. Heels dry, cracked and calloused. No other skin issues noted.

## 2017-12-10 NOTE — DISCHARGE INSTRUCTIONS
Discharge Instructions    Discharged to home by car with friend. Discharged via wheelchair, hospital escort: Yes.  Special equipment needed: Not Applicable    Be sure to schedule a follow-up appointment with your primary care doctor or any specialists as instructed.     Diet regular. Activity- No heavy lifting greater than 15 lbs for 3 weeks.  Follow up with Dr. Chávez in 3-4 weeks    Discharge Plan:   Diet Plan: Discussed  Activity Level: Discussed  Smoking Cessation Offered: Patient Counseled  Confirmed Follow up Appointment: Appointment Scheduled  Confirmed Symptoms Management: Discussed  Medication Reconciliation Updated: Yes  Influenza Vaccine Indication: Patient Refuses    I understand that a diet low in cholesterol, fat, and sodium is recommended for good health. Unless I have been given specific instructions below for another diet, I accept this instruction as my diet prescription.   Other diet: Regular    Special Instructions: None    · Is patient discharged on Warfarin / Coumadin?   No     · Is patient Post Blood Transfusion?  No    Depression / Suicide Risk    As you are discharged from this [facility] facility, it is important to learn how to keep safe from harming yourself.    Recognize the warning signs:  · Abrupt changes in personality, positive or negative- including increase in energy   · Giving away possessions  · Change in eating patterns- significant weight changes-  positive or negative  · Change in sleeping patterns- unable to sleep or sleeping all the time   · Unwillingness or inability to communicate  · Depression  · Unusual sadness, discouragement and loneliness  · Talk of wanting to die  · Neglect of personal appearance   · Rebelliousness- reckless behavior  · Withdrawal from people/activities they love  · Confusion- inability to concentrate     If you or a loved one observes any of these behaviors or has concerns about self-harm, here's what you can do:  · Talk about it- your feelings  and reasons for harming yourself  · Remove any means that you might use to hurt yourself (examples: pills, rope, extension cords, firearm)  · Get professional help from the community (Mental Health, Substance Abuse, psychological counseling)  · Do not be alone:Call your Safe Contact- someone whom you trust who will be there for you.  · Call your local CRISIS HOTLINE 574-7070 or 405-008-6766  · Call your local Children's Mobile Crisis Response Team [place] (192) 223-3354 or [employer]  · Call the toll free National Suicide Prevention Hotlines   · National Suicide Prevention Lifeline 351-017-WSUY (1142)  · [employer] Line Network 800-SUICIDE (489-2331)      Atrial Fibrillation  Atrial fibrillation is a condition that causes your heart to beat irregularly. It may also cause your heart to beat faster than normal. Atrial fibrillation can prevent your heart from pumping blood normally. It increases your risk of stroke and heart problems.  HOME CARE  · Take medications as told by your doctor.  · Only take medications that your doctor says are safe. Some medications can make the condition worse or happen again.  · If blood thinners were prescribed by your doctor, take them exactly as told. Too much can cause bleeding. Too little and you will not have the needed protection against stroke and other problems.  · Perform blood tests at home if told by your doctor.  · Perform blood tests exactly as told by your doctor.  · Do not drink alcohol.  · Do not drink beverages with caffeine such as coffee, soda, and some teas.  · Maintain a healthy weight.  · Do not use diet pills unless your doctor says they are safe. They may make heart problems worse.  · Follow diet instructions as told by your doctor.  · Exercise regularly as told by your doctor.  · Keep all follow-up appointments.  GET HELP IF:  · You notice a change in the speed, rhythm, or strength of your heartbeat.  · You suddenly begin peeing (urinating)  more often.  · You get tired more easily when moving or exercising.  GET HELP RIGHT AWAY IF:   · You have chest or belly (abdominal) pain.  · You feel sick to your stomach (nauseous).  · You are short of breath.  · You suddenly have swollen feet and ankles.  · You feel dizzy.  · You face, arms, or legs feel numb or weak.  · There is a change in your vision or speech.  MAKE SURE YOU:   · Understand these instructions.  · Will watch your condition.  · Will get help right away if you are not doing well or get worse.     This information is not intended to replace advice given to you by your health care provider. Make sure you discuss any questions you have with your health care provider.     Document Released: 09/26/2009 Document Revised: 01/08/2016 Document Reviewed: 04/13/2016  [employer] Interactive Patient Education ©2016 [employer]      Acute Urinary Retention, Male  Acute urinary retention is when you are unable to pee (urinate). Acute urinary retention is common in older men. Prostates can get bigger, which blocks the flow of pee.   HOME CARE  · Drink enough fluids to keep your pee clear or pale yellow.  · If you are sent home with a tube that drains the bladder (catheter), there will be a drainage bag attached to it. There are two types of bags. One is big that you can wear at night without having to empty it. One is smaller and needs to be emptied more often.  ¨ Keep the drainage bag empty.  ¨ Keep the drainage bag lower than your catheter.  · Only take medicine as told by your doctor.  GET HELP IF:  · You have a low-grade fever.  · You have spasms or you are leaking pee when you have spasms.  GET HELP RIGHT AWAY IF:   · You have chills or a fever.  · Your catheter stops draining pee.  · Your catheter falls out.  · You have increased bleeding that does not stop after you have rested and increased the amount of fluids you had been drinking.  MAKE SURE YOU:   · Understand these instructions.  · Will watch your  condition.  · Will get help right away if you are not doing well or get worse.     This information is not intended to replace advice given to you by your health care provider. Make sure you discuss any questions you have with your health care provider.     Document Released: 06/05/2009 Document Revised: 05/03/2016 Document Reviewed: 05/29/2014  [employer] Interactive Patient Education ©2016 [employer]

## 2017-12-11 ENCOUNTER — TELEPHONE (OUTPATIENT)
Dept: CARDIOLOGY | Facility: MEDICAL CENTER | Age: 62
End: 2017-12-11

## 2017-12-11 NOTE — TELEPHONE ENCOUNTER
----- Message from SAMARIA Cruz sent at 12/9/2017 10:55 AM PST -----  DR Dumont is requesting a new patient consult with EP     Schedulers will only do it with APN    Would you please schedule a new patient visit with either of EP physicians     Thank you     [facility]P procedure on 12/[facility]/17

## 2017-12-14 ENCOUNTER — OFFICE VISIT (OUTPATIENT)
Dept: CARDIOLOGY | Facility: MEDICAL CENTER | Age: 62
End: 2017-12-14
Payer: MEDICARE

## 2017-12-14 ENCOUNTER — TELEPHONE (OUTPATIENT)
Dept: CARDIOLOGY | Facility: MEDICAL CENTER | Age: 62
End: 2017-12-14

## 2017-12-14 VITALS
DIASTOLIC BLOOD PRESSURE: 80 MMHG | BODY MASS INDEX: 36.07 KG/M2 | SYSTOLIC BLOOD PRESSURE: 110 MMHG | OXYGEN SATURATION: 98 % | HEART RATE: 64 BPM | WEIGHT: 238 LBS | HEIGHT: 68 IN

## 2017-12-14 DIAGNOSIS — N40.1 BENIGN PROSTATIC HYPERPLASIA WITH INCOMPLETE BLADDER EMPTYING: ICD-10-CM

## 2017-12-14 DIAGNOSIS — R39.14 BENIGN PROSTATIC HYPERPLASIA WITH INCOMPLETE BLADDER EMPTYING: ICD-10-CM

## 2017-12-14 DIAGNOSIS — I10 ESSENTIAL HYPERTENSION: ICD-10-CM

## 2017-12-14 DIAGNOSIS — I48.91 NEW ONSET ATRIAL FIBRILLATION (HCC): Primary | ICD-10-CM

## 2017-12-14 DIAGNOSIS — N18.30 CKD (CHRONIC KIDNEY DISEASE), STAGE III (HCC): ICD-10-CM

## 2017-12-14 PROCEDURE — 93000 ELECTROCARDIOGRAM COMPLETE: CPT | Performed by: INTERNAL MEDICINE

## 2017-12-14 PROCEDURE — 99204 OFFICE O/P NEW MOD 45 MIN: CPT | Performed by: INTERNAL MEDICINE

## 2017-12-14 NOTE — PROGRESS NOTES
Arrhythmia Clinic Note (New patient)     DOS: 12/14/2017    Referring physician: Garrick Dumont    Chief complaint/Reason for consult: Persistent AF    HPI:  Patient is a 63 yo WM with history of HTN, Hep C, BPH, lives in [place], who is s/p recent TURP. During the procedure his HR shot up and was found to be in AF with RVR. His symptoms are chest discomfort and palpitations. He says these symptoms are mild to moderate in severity. He has never had this issue before. He saw Dr. Dumont and was started on low dose tianna agents. OAC was not started 2/2 to proximity to TURP. He is referred for further AF management.    ROS (+ highlighted in red):  Constitutional: Fevers/chills/fatigue/weightloss  HEENT: Blurry vision/eye pain/sore throat/hearing loss  Respiratory: Shortness of breath/cough  Cardiovascular: Chest pain/palpitations/edema/orthopnea/syncope  GI: Nausea/vomitting/diarrhea  MSK: Arthralgias/myagias/muscle weakness  Skin: Rash/sores  Neurological: Numbness/tremors/vertigo  Endocrine: Excessive thirst/polyuria/cold intolerance/heat intolerance  Psych: Depression/anxiety    Past Medical History:   Diagnosis Date   • Bowel habit changes     CONSTIPATION   • Disorder of thyroid     AS A CHILD   • Hepatitis C 1980   • Hypertension    • Indigestion    • Psychiatric problem    • Renal disorder     KIDNEY FAILURE EARLY PT.   • Urinary bladder disorder     ENLARGED PROSTATE       Past Surgical History:   Procedure Laterality Date   • TRANS URETHRAL RESECTION PROSTATE  12/8/2017    Procedure: TRANS URETHRAL RESECTION PROSTATE;  Surgeon: Jonny Chávez M.D.;  Location: SURGERY [facility];  Service: Urology   • INGUINAL HERNIA REPAIR CHILD  as child       Social History     Social History   • Marital status:      Spouse name: N/A   • Number of children: N/A   • Years of education: N/A     Occupational History   • Not on file.     Social History Main Topics   • Smoking status: Current Some Day Smoker      "Packs/day: 0.25   • Smokeless tobacco: Never Used      Comment: less 5 cigarettes in daily    • Alcohol use No   • Drug use: No   • Sexual activity: Not on file     Other Topics Concern   • Not on file     Social History Narrative   • No narrative on file       History reviewed. No pertinent family history.    No Known Allergies    Current Outpatient Prescriptions   Medication Sig Dispense Refill   • diltiazem CD (CARDIZEM CD) 120 MG CAPSULE SR 24 HR Take 1 Cap by mouth every day. 30 Cap 0   • finasteride (PROSCAR) 5 MG Tab Take 5 mg by mouth every evening.     • doxazosin (CARDURA) 1 MG Tab Take 1 Tab by mouth every bedtime. 30 Tab 2   • lansoprazole (PREVACID) 30 MG CAPSULE DELAYED RELEASE Take 30 mg by mouth as needed.       No current facility-administered medications for this visit.        Physical Exam:  Vitals:    12/14/17 1414   BP: 110/80   Pulse: 64   SpO2: 98%   Weight: 108 kg (238 lb)   Height: 1.727 m (5' 8\")     General appearance: NAD, conversant   Eyes: anicteric sclerae, moist conjunctivae; no lid-lag; PERRLA  HENT: Atraumatic; oropharynx clear with moist mucous membranes and no mucosal ulcerations; normal hard and soft palate  Neck: Trachea midline; FROM, supple, no thyromegaly or lymphadenopathy  Lungs: CTA, with normal respiratory effort and no intercostal retractions  CV: irregularly irregular, tachy, no MRGs, no JVD   Abdomen: Soft, non-tender; no masses or HSM  Extremities: No peripheral edema or extremity lymphadenopathy  Skin: Normal temperature, turgor and texture; no rash, ulcers or subcutaneous nodules  Psych: Appropriate affect, alert and oriented to person, place and time    Data:  Labs reviewed    Prior echo/stress results reviewed:  LVEF 45%, possibly 2/2 AF and rate    EKG interpreted by me:  AF, RBBB    Impression/Plan:  1. New onset atrial fibrillation (CMS-HCC)     2. CKD (chronic kidney disease), stage III     3. Essential hypertension     4. Benign prostatic hyperplasia with " incomplete bladder emptying       -Cardiac stress, if normal then will plan on IC antiarrhythmic  -Will proceed with MAHOGANY/DCCV  -Start IC antiarrhythmic afterwards if stress normal  -Start OAC at that point (Eliquis)    Johnny Garcia MD

## 2017-12-14 NOTE — TELEPHONE ENCOUNTER
PER dr. CAZARES: please call pt. To tell him he needs to start Eliquis 5mg BID. In 3-4 days (pt. Just had TURP).   Also, per Ilsa, pt. Needs to check in at 9:00am .  Left message for pt. To call.

## 2017-12-15 NOTE — TELEPHONE ENCOUNTER
Called pt. To advise.  1. RX called to [facility], [place].  2. Pt. Will check-in at 9am for MAHOGANY/DCCV on 12-20-17.

## 2017-12-16 LAB — EKG IMPRESSION: NORMAL

## 2017-12-19 ENCOUNTER — HOSPITAL ENCOUNTER (OUTPATIENT)
Dept: RADIOLOGY | Facility: MEDICAL CENTER | Age: 62
End: 2017-12-19
Attending: INTERNAL MEDICINE
Payer: MEDICARE

## 2017-12-19 DIAGNOSIS — R94.31 ABNORMAL EKG: ICD-10-CM

## 2017-12-19 PROCEDURE — A9502 TC99M TETROFOSMIN: HCPCS

## 2017-12-19 RX ORDER — REGADENOSON 0.08 MG/ML
INJECTION, SOLUTION INTRAVENOUS
Status: COMPLETED
Start: 2017-12-19 | End: 2017-12-19

## 2017-12-19 RX ADMIN — REGADENOSON 0.4 MG: 0.08 INJECTION, SOLUTION INTRAVENOUS at 13:58

## 2017-12-20 ENCOUNTER — HOSPITAL ENCOUNTER (OUTPATIENT)
Facility: MEDICAL CENTER | Age: 62
End: 2017-12-20
Attending: INTERNAL MEDICINE | Admitting: INTERNAL MEDICINE
Payer: MEDICARE

## 2017-12-20 VITALS
SYSTOLIC BLOOD PRESSURE: 100 MMHG | OXYGEN SATURATION: 99 % | BODY MASS INDEX: 35.38 KG/M2 | TEMPERATURE: 97.1 F | DIASTOLIC BLOOD PRESSURE: 65 MMHG | HEART RATE: 98 BPM | WEIGHT: 233.47 LBS | HEIGHT: 68 IN | RESPIRATION RATE: 16 BRPM

## 2017-12-20 LAB
ANION GAP SERPL CALC-SCNC: 9 MMOL/L (ref 0–11.9)
BUN SERPL-MCNC: 26 MG/DL (ref 8–22)
CALCIUM SERPL-MCNC: 8.6 MG/DL (ref 8.5–10.5)
CHLORIDE SERPL-SCNC: 110 MMOL/L (ref 96–112)
CO2 SERPL-SCNC: 21 MMOL/L (ref 20–33)
CREAT SERPL-MCNC: 1.36 MG/DL (ref 0.5–1.4)
EKG IMPRESSION: NORMAL
EKG IMPRESSION: NORMAL
ERYTHROCYTE [DISTWIDTH] IN BLOOD BY AUTOMATED COUNT: 45.6 FL (ref 35.9–50)
GFR SERPL CREATININE-BSD FRML MDRD: 53 ML/MIN/1.73 M 2
GLUCOSE SERPL-MCNC: 120 MG/DL (ref 65–99)
HCT VFR BLD AUTO: 37.7 % (ref 42–52)
HGB BLD-MCNC: 12.3 G/DL (ref 14–18)
INR PPP: 1.12 (ref 0.87–1.13)
MCH RBC QN AUTO: 28.1 PG (ref 27–33)
MCHC RBC AUTO-ENTMCNC: 32.6 G/DL (ref 33.7–35.3)
MCV RBC AUTO: 86.1 FL (ref 81.4–97.8)
PLATELET # BLD AUTO: 428 K/UL (ref 164–446)
PMV BLD AUTO: 8.9 FL (ref 9–12.9)
POTASSIUM SERPL-SCNC: 3.9 MMOL/L (ref 3.6–5.5)
PROTHROMBIN TIME: 14.1 SEC (ref 12–14.6)
RBC # BLD AUTO: 4.38 M/UL (ref 4.7–6.1)
SODIUM SERPL-SCNC: 140 MMOL/L (ref 135–145)
WBC # BLD AUTO: 7.1 K/UL (ref 4.8–10.8)

## 2017-12-20 PROCEDURE — 92960 CARDIOVERSION ELECTRIC EXT: CPT

## 2017-12-20 PROCEDURE — 160002 HCHG RECOVERY MINUTES (STAT)

## 2017-12-20 PROCEDURE — 93312 ECHO TRANSESOPHAGEAL: CPT

## 2017-12-20 PROCEDURE — 85610 PROTHROMBIN TIME: CPT

## 2017-12-20 PROCEDURE — 700111 HCHG RX REV CODE 636 W/ 250 OVERRIDE (IP)

## 2017-12-20 PROCEDURE — 93010 ELECTROCARDIOGRAM REPORT: CPT | Mod: 76 | Performed by: INTERNAL MEDICINE

## 2017-12-20 PROCEDURE — 700102 HCHG RX REV CODE 250 W/ 637 OVERRIDE(OP): Performed by: INTERNAL MEDICINE

## 2017-12-20 PROCEDURE — 85027 COMPLETE CBC AUTOMATED: CPT

## 2017-12-20 PROCEDURE — 93325 DOPPLER ECHO COLOR FLOW MAPG: CPT

## 2017-12-20 PROCEDURE — 93005 ELECTROCARDIOGRAM TRACING: CPT | Performed by: INTERNAL MEDICINE

## 2017-12-20 PROCEDURE — 304952 HCHG R 2 PADS

## 2017-12-20 PROCEDURE — A9270 NON-COVERED ITEM OR SERVICE: HCPCS | Performed by: INTERNAL MEDICINE

## 2017-12-20 PROCEDURE — 80048 BASIC METABOLIC PNL TOTAL CA: CPT

## 2017-12-20 RX ORDER — FLECAINIDE ACETATE 50 MG/1
50 TABLET ORAL 2 TIMES DAILY
Qty: 60 TAB | Refills: 3 | Status: SHIPPED | OUTPATIENT
Start: 2017-12-20 | End: 2018-04-23 | Stop reason: SDUPTHER

## 2017-12-20 RX ORDER — FLECAINIDE ACETATE 50 MG/1
50 TABLET ORAL ONCE
Status: COMPLETED | OUTPATIENT
Start: 2017-12-20 | End: 2017-12-20

## 2017-12-20 RX ADMIN — FLECAINIDE ACETATE 50 MG: 50 TABLET ORAL at 13:45

## 2017-12-20 ASSESSMENT — PAIN SCALES - GENERAL
PAINLEVEL_OUTOF10: 0

## 2017-12-20 NOTE — DISCHARGE INSTRUCTIONS
ACTIVITY: Rest and take it easy for the first 24 hours.  A responsible adult is recommended to remain with you during that time.  It is normal to feel sleepy.  We encourage you to not do anything that requires balance, judgment or coordination.    MILD FLU-LIKE SYMPTOMS ARE NORMAL. YOU MAY EXPERIENCE GENERALIZED MUSCLE ACHES, THROAT IRRITATION, HEADACHE AND/OR SOME NAUSEA.    FOR 24 HOURS DO NOT:  Drive, operate machinery or run household appliances.  Drink beer or alcoholic beverages.   Make important decisions or sign legal documents.    SPECIAL INSTRUCTIONS: Refer to MAHOGANY and cardioversion information.  Tambocor from pharmacy.    DIET: To avoid nausea, slowly advance diet as tolerated, avoiding spicy or greasy foods for the first day.  Add more substantial food to your diet according to your physician's instructions.  Babies can be fed formula or breast milk as soon as they are hungry.  INCREASE FLUIDS AND FIBER TO AVOID CONSTIPATION.    FOLLOW-UP APPOINTMENT:  A follow-up appointment should be arranged with your doctor; call to schedule.    You should CALL YOUR PHYSICIAN if you develop:  Fever greater than 101 degrees F.  Pain not relieved by medication, or persistent nausea or vomiting.  Excessive bleeding (blood soaking through dressing) or unexpected drainage from the wound.  Extreme redness or swelling around the incision site, drainage of pus or foul smelling drainage.  Inability to urinate or empty your bladder within 8 hours.  Problems with breathing or chest pain.    You should call 911 if you develop problems with breathing or chest pain.  If you are unable to contact your doctor or surgical center, you should go to the nearest emergency room or urgent care center.  Physician's telephone #: [phone]    If any questions arise, call your doctor.  If your doctor is not available, please feel free to call the Surgical Center at [phone].  The Center is open Monday through Friday from 7AM to  7PM.  You can also call the HEALTH HOTLINE open 24 hours/day, 7 days/week and speak to a nurse at [phone], or toll free at [phone].    A registered nurse may call you a few days after your surgery to see how you are doing after your procedure.    MEDICATIONS: Resume taking daily medication.  Take prescribed pain medication with food.  If no medication is prescribed, you may take non-aspirin pain medication if needed.  PAIN MEDICATION CAN BE VERY CONSTIPATING.  Take a stool softener or laxative such as senokot, pericolace, or milk of magnesia if needed.    Prescription given for Tambocor.    If your physician has prescribed pain medication that includes Acetaminophen (Tylenol), do not take additional Acetaminophen (Tylenol) while taking the prescribed medication.    Depression / Suicide Risk    As you are discharged from this [facility] facility, it is important to learn how to keep safe from harming yourself.    Recognize the warning signs:  · Abrupt changes in personality, positive or negative- including increase in energy   · Giving away possessions  · Change in eating patterns- significant weight changes-  positive or negative  · Change in sleeping patterns- unable to sleep or sleeping all the time   · Unwillingness or inability to communicate  · Depression  · Unusual sadness, discouragement and loneliness  · Talk of wanting to die  · Neglect of personal appearance   · Rebelliousness- reckless behavior  · Withdrawal from people/activities they love  · Confusion- inability to concentrate     If you or a loved one observes any of these behaviors or has concerns about self-harm, here's what you can do:  · Talk about it- your feelings and reasons for harming yourself  · Remove any means that you might use to hurt yourself (examples: pills, rope, extension cords, firearm)  · Get professional help from the community (Mental Health, Substance Abuse, psychological counseling)  · Do not be alone:Call your  Safe Contact- someone whom you trust who will be there for you.  · Call your local CRISIS HOTLINE 527-4514 or 415-634-6776  · Call your local Children's Mobile Crisis Response Team [place] (888) 429-5675 or www.[employer]  · Call the toll free National Suicide Prevention Hotlines   · National Suicide Prevention Lifeline 417-341-AVZK (2973)  · [employer] Network Line Network 800-SUICIDE (608-3442)      Transesophageal Echocardiogram  Transesophageal echocardiography (MAHOGANY) is a picture test of your heart using sound waves. The pictures taken can give very detailed pictures of your heart. This can help your doctor see if there are problems with your heart. MAHOGANY can check:  · If your heart has blood clots in it.  · How well your heart valves are working.  · If you have an infection on the inside of your heart.  · Some of the major arteries of your heart.  · If your heart valve is working after a repair.  · Your heart before a procedure that uses a shock to your heart to get the rhythm back to normal.  BEFORE THE PROCEDURE  · Do not eat or drink for 6 hours before the procedure or as told by your doctor.  · Make plans to have someone drive you home after the procedure. Do not drive yourself home.  · An IV tube will be put in your arm.  PROCEDURE  · You will be given a medicine to help you relax (sedative). It will be given through the IV tube.  · A numbing medicine will be sprayed or gargled in the back of your throat to help numb it.  · The tip of the probe is placed into the back of your mouth. You will be asked to swallow. This helps to pass the probe into your esophagus.  · Once the tip of the probe is in the right place, your doctor can take pictures of your heart.  · You may feel pressure at the back of your throat.  AFTER THE PROCEDURE  · You will be taken to a recovery area so the sedative can wear off.  · Your throat may be sore and scratchy. This will go away slowly over time.  · You will go home when you  are fully awake and able to swallow liquids.  · You should have someone stay with you for the next 24 hours.  · Do not drive or operate machinery for the next 24 hours.     This information is not intended to replace advice given to you by your health care provider. Make sure you discuss any questions you have with your health care provider.     Document Released: 10/15/2010 Document Revised: 12/23/2014 Document Reviewed: 06/19/2014  [employer] Interactive Patient Education ©2016 [employer]      Electrical Cardioversion, Care After  Refer to this sheet in the next few weeks. These instructions provide you with information on caring for yourself after your procedure. Your health care provider may also give you more specific instructions. Your treatment has been planned according to current medical practices, but problems sometimes occur. Call your health care provider if you have any problems or questions after your procedure.  WHAT TO EXPECT AFTER THE PROCEDURE  After your procedure, it is typical to have the following sensations:  · Some redness on the skin where the shocks were delivered. If this is tender, a sunburn lotion or hydrocortisone cream may help.  · Possible return of an abnormal heart rhythm within hours or days after the procedure.  HOME CARE INSTRUCTIONS  · Take medicines only as directed by your health care provider. Be sure you understand how and when to take your medicine.  · Learn how to feel your pulse and check it often.  · Limit your activity for 48 hours after the procedure or as directed by your health care provider.  · Avoid or minimize caffeine and other stimulants as directed by your health care provider.  SEEK MEDICAL CARE IF:  · You feel like your heart is beating too fast or your pulse is not regular.  · You have any questions about your medicines.  · You have bleeding that will not stop.  SEEK IMMEDIATE MEDICAL CARE IF:  · You are dizzy or feel faint.  · It is hard to breathe or you  feel short of breath.  · There is a change in discomfort in your chest.  · Your speech is slurred or you have trouble moving an arm or leg on one side of your body.  · You get a serious muscle cramp that does not go away.  · Your fingers or toes turn cold or blue.     This information is not intended to replace advice given to you by your health care provider. Make sure you discuss any questions you have with your health care provider.     Document Released: 10/08/2014 Document Revised: 01/08/2016 Document Reviewed: 10/08/2014  [employer] Interactive Patient Education ©2016 [employer]

## 2017-12-20 NOTE — PROGRESS NOTES
Report received and Pt admitted to PPU 5 s/p MAHOGANY and cardioversion. Pt attached to all leads and monitors. VSS with no complaints of pain or nausea. Fluids and food offered. Orders reviewed.

## 2017-12-20 NOTE — PROGRESS NOTES
Pt. Meets discharge criteria. Pt. And responsible adult given discharge instructions along with Rx's. Pt. And responsible adult educated and all questions answered. Signed copy on chart. All lines and drains DC'd. Pt. Belongings with Pt and Pt. Transported via wheel chair to car with escort.

## 2017-12-20 NOTE — PROCEDURES
Procedure performed: External DC Cardioversion    : Johnny Garcia MD    Assistant: None    Anesthesia: MAC    Indication: Persistent atrial fibrillation    Description of procedure:    Patient was brought to echo lab 3. Informed consent was obtained. MAHOGANY was obtained excluding KAN thrombus and will be dictated separately. Defibrillator pads were placed in the anterior and posterior position.  Adequate sedation was obtained with assistance of anesthesia. Patient was successfully cardioverted with 200J synchronized biphasic energy into sinus rhythm. He was monitored in the recovery area until criteria met and will be discharged home.    Conclusion: Successful DC cardioversion    EBL: None    Complications: None    Specimens: None    Recommendation:  1)Continue OAC  2)Initiate AAD therapy  3)DC home

## 2017-12-21 LAB — LV EJECT FRACT  99904: 60

## 2017-12-22 NOTE — DISCHARGE SUMMARY
DATE OF ADMISSION:  12/08/2017    DATE OF DISCHARGE:  12/09/2017    DISCHARGE DIAGNOSES:  1.  Benign prostatic hyperplasia, status post transurethral resection of the   prostate this hospitalization.  2.  Hypertension.  3.  New-onset atrial fibrillation.  4.  History of chronic kidney disease stage III.  5.  History of hepatitis C.    PROCEDURE PERFORMED:  Transurethral resection of the prostate (pathology   demonstrating BPH).    HISTORY:  This 62-year-old male with refractory BPH and urinary retention was   admitted for elective transurethral resection of the prostate.    HOSPITAL COURSE:  He underwent transurethral resection of the prostate on   12/08/2017.  He was noted to have atrial fibrillation, which was a new   diagnosis found during the procedure.  He was seen by cardiology (Dr. Dumont)   in the perioperative period and outpatient followup was arranged for the   patient.  He was treated with metoprolol and amlodipine initially, and   amlodipine was changed to diltiazem at the time of discharge.  He will follow   up with the cardiologist as an outpatient.    Patient was noted to have clear urine on continuous bladder irrigation and was   discharged home after catheter was removed voiding spontaneously.    DISPOSITION:  Discharged home.    FOLLOWUP:  1.  With [facility] [place] in 1-2 weeks for postvoid residual.  2.  With Dr. Dumont of cardiology as scheduled through his office.       ____________________________________     MD FORREST CHAHAL / LISBET    DD:  12/21/2017 11:00:04  DT:  12/21/2017 22:09:00    D#:  [id number]  Job#:  [id number]

## 2017-12-26 ENCOUNTER — OFFICE VISIT (OUTPATIENT)
Dept: CARDIOLOGY | Facility: MEDICAL CENTER | Age: 62
End: 2017-12-26
Payer: MEDICARE

## 2017-12-26 VITALS
HEART RATE: 92 BPM | SYSTOLIC BLOOD PRESSURE: 120 MMHG | DIASTOLIC BLOOD PRESSURE: 80 MMHG | WEIGHT: 234 LBS | BODY MASS INDEX: 35.46 KG/M2 | HEIGHT: 68 IN | OXYGEN SATURATION: 93 %

## 2017-12-26 DIAGNOSIS — Z79.899 ENCOUNTER FOR MONITORING FLECAINIDE THERAPY: ICD-10-CM

## 2017-12-26 DIAGNOSIS — Z79.01 CHRONIC ANTICOAGULATION: ICD-10-CM

## 2017-12-26 DIAGNOSIS — I48.91 ATRIAL FIBRILLATION, UNSPECIFIED TYPE (HCC): Primary | ICD-10-CM

## 2017-12-26 DIAGNOSIS — Z51.81 ENCOUNTER FOR MONITORING FLECAINIDE THERAPY: ICD-10-CM

## 2017-12-26 DIAGNOSIS — N18.30 CKD (CHRONIC KIDNEY DISEASE), STAGE III (HCC): ICD-10-CM

## 2017-12-26 PROCEDURE — 93018 CV STRESS TEST I&R ONLY: CPT | Performed by: INTERNAL MEDICINE

## 2017-12-26 PROCEDURE — 99214 OFFICE O/P EST MOD 30 MIN: CPT | Performed by: INTERNAL MEDICINE

## 2017-12-26 PROCEDURE — 93000 ELECTROCARDIOGRAM COMPLETE: CPT | Performed by: INTERNAL MEDICINE

## 2017-12-27 NOTE — PROGRESS NOTES
"Arrhythmia Clinic Note (Established patient)    DOS: 12/26/2017    Chief complaint/Reason for consult: F/u AF    Interval History:  Patient is a 61 yo gentleman with history of persistent AF s/p recent cardioversion. Post cardioversion he has been feeling better with more energy and less fatigue. He was started on dilt/flecainide and OAC with eliquis which he is tolerating.    ROS (+ highlighted in red):  Constitutional: Fevers/chills/fatigue/weightloss  Respiratory: Shortness of breath/cough  Cardiovascular: Chest pain/palpitations/edema/orthopnea/syncope    Past Medical History:   Diagnosis Date   • Bowel habit changes     CONSTIPATION   • Disorder of thyroid     AS A CHILD   • Hepatitis C 1980   • Hypertension    • Indigestion    • Psychiatric problem    • Renal disorder     KIDNEY FAILURE EARLY PT.   • Urinary bladder disorder     ENLARGED PROSTATE       No Known Allergies    Current Outpatient Prescriptions   Medication Sig Dispense Refill   • flecainide (TAMBOCOR) 50 MG tablet Take 1 Tab by mouth 2 times a day. 60 Tab 3   • apixaban (ELIQUIS) 5mg Tab Take 1 Tab by mouth 2 Times a Day. 60 Tab 0   • diltiazem CD (CARDIZEM CD) 120 MG CAPSULE SR 24 HR Take 1 Cap by mouth every day. 30 Cap 0   • finasteride (PROSCAR) 5 MG Tab Take 5 mg by mouth every evening.     • doxazosin (CARDURA) 1 MG Tab Take 1 Tab by mouth every bedtime. 30 Tab 2   • lansoprazole (PREVACID) 30 MG CAPSULE DELAYED RELEASE Take 30 mg by mouth as needed.       No current facility-administered medications for this visit.        Physical Exam:  Vitals:    12/26/17 1416   BP: 120/80   Pulse: 92   SpO2: 93%   Weight: 106.1 kg (234 lb)   Height: 1.727 m (5' 7.99\")     General appearance: NAD, conversant   Eyes: anicteric sclerae, moist conjunctivae; no lid-lag; PERRLA  HENT: Atraumatic; oropharynx clear with moist mucous membranes and no mucosal ulcerations; normal hard and soft palate  Neck: Trachea midline; FROM, supple, no thyromegaly or " lymphadenopathy  Lungs: CTA, with normal respiratory effort and no intercostal retractions  CV: RRR, no MRGs, no JVD  Abdomen: Soft, non-tender; no masses or HSM  Extremities: No peripheral edema or extremity lymphadenopathy  Skin: Normal temperature, turgor and texture; no rash, ulcers or subcutaneous nodules  Psych: Appropriate affect, alert and oriented to person, place and time    Data:  Labs reviewed    Prior echo/stress reviewed:  LVEF borderline in AF    EKG interpreted by me:  Sinus, nonspecific IVCD    Impression/Plan:  1. Atrial fibrillation, unspecified type (CMS-HCC)  EKG    CARDIAC STRESS TEST TREADMILL ONLY   2. Encounter for monitoring flecainide therapy     3. CKD (chronic kidney disease), stage III     4. Chronic anticoagulation       -His QRS is a little bit wider today on the flecainide  -Remains in sinus  -I would like an ETT to evaluate this QRS width  -Continue current regimen    Johnny Garcia MD

## 2017-12-28 LAB — EKG IMPRESSION: NORMAL

## 2018-01-05 ENCOUNTER — NON-PROVIDER VISIT (OUTPATIENT)
Dept: CARDIOLOGY | Facility: MEDICAL CENTER | Age: 63
End: 2018-01-05
Attending: INTERNAL MEDICINE
Payer: MEDICARE

## 2018-01-05 VITALS
WEIGHT: 234 LBS | OXYGEN SATURATION: 97 % | DIASTOLIC BLOOD PRESSURE: 90 MMHG | HEART RATE: 95 BPM | SYSTOLIC BLOOD PRESSURE: 138 MMHG | BODY MASS INDEX: 35.46 KG/M2 | HEIGHT: 68 IN

## 2018-01-05 DIAGNOSIS — I48.91 ATRIAL FIBRILLATION, UNSPECIFIED TYPE (HCC): ICD-10-CM

## 2018-01-05 PROCEDURE — 93015 CV STRESS TEST SUPVJ I&R: CPT | Performed by: INTERNAL MEDICINE

## 2018-01-11 ENCOUNTER — TELEPHONE (OUTPATIENT)
Dept: CARDIOLOGY | Facility: MEDICAL CENTER | Age: 63
End: 2018-01-11

## 2018-01-11 NOTE — TELEPHONE ENCOUNTER
----- Message from Johnny Garcia M.D. sent at 1/11/2018 10:12 AM PST -----  Tracings reviewed. Baseline RBBB QRS looks to be about the same width at max HR. We can continue current dilt/flec regimen. Please schedule f/u in 6 months.

## 2018-01-11 NOTE — TELEPHONE ENCOUNTER
Called pt. To advise. He has Eliquis 5mg 3 days' worth of samples. Nurse will call refill to [facility] in [place]. If copay is too expensive (pt. Is now out of donut hole) pt. Will nurse to let her know.

## 2018-01-13 NOTE — DISCHARGE SUMMARY
DATE OF ADMISSION:  12/8/2017    DATE OF DISCHARGE:  12/9/2017    DISCHARGE DIAGNOSES:  1.  Benign prostatic hyperplasia.  2.  Status post transurethral resection of the prostate this hospitalization.  3.  Atrial fibrillation, new onset.    HISTORY:  This 62-year-old gentleman developed acute urinary retention.  He   was seen 10/24/2017 with [place] catheter in place.  Cystoscopy at that time   showed a bladder calculus and BPH.  Patient had been receiving Flomax, but   failed a voiding trial.  He was started on finasteride in the office and I   elected to proceed with transurethral resection of the prostate.    HOSPITAL COURSE:  The patient was admitted.  He underwent an uneventful   transurethral resection of the prostate.  Pathology showed benign tissue.    Intraoperatively, he was noted to be in/developed atrial fibrillation.  Dr. Dumont of cardiology did consult on the patient perioperatively.    Recommendation was to start him on metoprolol.  His rate was controlled and he   will have a followup in the cardiology office.    Catheter was removed the day after surgery, he was voiding and was discharged   home without catheter.  He was not anticoagulated due to his recent surgery.    He will follow up in the urology office as scheduled, then with cardiology.       ____________________________________     MD FORREST CHAHAL / LISBET    DD:  01/12/2018 16:19:20  DT:  01/12/2018 19:10:29    D#:  [id number]  Job#:  [id number]

## 2018-01-15 ENCOUNTER — TELEPHONE (OUTPATIENT)
Dept: CARDIOLOGY | Facility: MEDICAL CENTER | Age: 63
End: 2018-01-15

## 2018-01-15 NOTE — TELEPHONE ENCOUNTER
Eliquis is too expensive for pt. Called samples to [facility] pharmacy. He has no drug coverage.

## 2018-01-15 NOTE — TELEPHONE ENCOUNTER
----- Message from Risa Bush sent at 1/15/2018 11:51 AM PST -----  Regarding: patient calling back about cost of Eliquis  AMIRA/Cindy      Patient is calling you back about cost of Eliquis. He said it would cost him $400.00/month. He said he spoke to you previously about getting him samples. He can be reached at [phone].

## 2018-02-09 DIAGNOSIS — I48.91 NEW ONSET ATRIAL FIBRILLATION (HCC): ICD-10-CM

## 2018-02-12 ENCOUNTER — TELEPHONE (OUTPATIENT)
Dept: CARDIOLOGY | Facility: MEDICAL CENTER | Age: 63
End: 2018-02-12

## 2018-02-12 NOTE — TELEPHONE ENCOUNTER
Called pt. To advise that Eliquis 5mg #60 samples are availabe for  at [facility].

## 2018-02-12 NOTE — TELEPHONE ENCOUNTER
----- Message from Deric Moreno sent at 2/12/2018  2:39 PM PST -----  Regarding: Samples of Xarelto  Contact: [phone]  AMIRA/Cindy     Pt [facility] Pt states prescription at [facility] is too expensive. He would please like a call back at [phone].

## 2018-03-13 ENCOUNTER — TELEPHONE (OUTPATIENT)
Dept: CARDIOLOGY | Facility: MEDICAL CENTER | Age: 63
End: 2018-03-13

## 2018-03-13 NOTE — TELEPHONE ENCOUNTER
----- Message from Risa Bush sent at 3/13/2018 10:06 AM PDT -----  Regarding: needs samples of Eliquis  AMIRA/Cindy      Patient is asking for samples of Eliquis. He can be reached at [phone].

## 2018-03-13 NOTE — TELEPHONE ENCOUNTER
Called pt. Samples #60 sent to [facility]. Gave pt. Contact information for [employer] Pt. Assistance [phone].

## 2018-04-13 ENCOUNTER — TELEPHONE (OUTPATIENT)
Dept: CARDIOLOGY | Facility: MEDICAL CENTER | Age: 63
End: 2018-04-13

## 2018-04-13 NOTE — TELEPHONE ENCOUNTER
----- Message from Risa Buhs sent at 4/13/2018  2:08 PM PDT -----  Regarding: samples of Eliquis  SS/Cindy      Patient is calling for samples of Eliquis. He would like to pick them up next Tuesday. He would like a call back at [phone].

## 2018-04-23 RX ORDER — FLECAINIDE ACETATE 50 MG/1
50 TABLET ORAL 2 TIMES DAILY
Qty: 60 TAB | Refills: 3 | Status: SHIPPED | OUTPATIENT
Start: 2018-04-23 | End: 2018-08-25 | Stop reason: SDUPTHER

## 2018-05-02 ENCOUNTER — OFFICE VISIT (OUTPATIENT)
Dept: CARDIOLOGY | Facility: MEDICAL CENTER | Age: 63
End: 2018-05-02
Payer: MEDICARE

## 2018-05-02 VITALS
DIASTOLIC BLOOD PRESSURE: 90 MMHG | SYSTOLIC BLOOD PRESSURE: 140 MMHG | OXYGEN SATURATION: 92 % | HEART RATE: 96 BPM | WEIGHT: 236 LBS | HEIGHT: 68 IN | BODY MASS INDEX: 35.77 KG/M2

## 2018-05-02 DIAGNOSIS — I10 ESSENTIAL HYPERTENSION: ICD-10-CM

## 2018-05-02 DIAGNOSIS — Z79.01 CHRONIC ANTICOAGULATION: ICD-10-CM

## 2018-05-02 DIAGNOSIS — Z51.81 ENCOUNTER FOR MONITORING FLECAINIDE THERAPY: ICD-10-CM

## 2018-05-02 DIAGNOSIS — I48.91 ATRIAL FIBRILLATION, UNSPECIFIED TYPE (HCC): Primary | ICD-10-CM

## 2018-05-02 DIAGNOSIS — Z79.899 ENCOUNTER FOR MONITORING FLECAINIDE THERAPY: ICD-10-CM

## 2018-05-02 LAB — EKG IMPRESSION: NORMAL

## 2018-05-02 PROCEDURE — 93000 ELECTROCARDIOGRAM COMPLETE: CPT | Performed by: INTERNAL MEDICINE

## 2018-05-02 PROCEDURE — 99214 OFFICE O/P EST MOD 30 MIN: CPT | Performed by: INTERNAL MEDICINE

## 2018-05-02 RX ORDER — DILTIAZEM HYDROCHLORIDE 120 MG/1
120 CAPSULE, COATED, EXTENDED RELEASE ORAL DAILY
Qty: 30 CAP | Refills: 11 | Status: SHIPPED | OUTPATIENT
Start: 2018-05-02 | End: 2018-06-14

## 2018-05-02 NOTE — PROGRESS NOTES
"Arrhythmia Clinic Note (Established patient)    DOS: 5/2/2018    Chief complaint/Reason for consult: F/u AF    Interval History:  Patient is a 63 yo gentleman with history of HTN and persistent AF s/p prior cardioversion. He has been on Flecainide since to maintain sinus rhythm. Doing well. Continues to be in sinus today. He is no longer taking a CCB as his rx ran out.    ROS (+ highlighted in red):  Constitutional: Fevers/chills/fatigue/weightloss  Respiratory: Shortness of breath/cough  Cardiovascular: Chest pain/palpitations/edema/orthopnea/syncope    Past Medical History:   Diagnosis Date   • Bowel habit changes     CONSTIPATION   • Disorder of thyroid     AS A CHILD   • Hepatitis C 1980   • Hypertension    • Indigestion    • Psychiatric problem    • Renal disorder     KIDNEY FAILURE EARLY PT.   • Urinary bladder disorder     ENLARGED PROSTATE       No Known Allergies    Current Outpatient Prescriptions   Medication Sig Dispense Refill   • DILTIAZem CD (CARDIZEM CD) 120 MG CAPSULE SR 24 HR Take 1 Cap by mouth every day. 30 Cap 11   • flecainide (TAMBOCOR) 50 MG tablet Take 1 Tab by mouth 2 times a day. 60 Tab 3   • apixaban (ELIQUIS) 5mg Tab Take 1 Tab by mouth 2 Times a Day. 60 Tab 0   • doxazosin (CARDURA) 1 MG Tab Take 1 Tab by mouth every bedtime. 30 Tab 2   • lansoprazole (PREVACID) 30 MG CAPSULE DELAYED RELEASE Take 30 mg by mouth as needed.     • finasteride (PROSCAR) 5 MG Tab Take 5 mg by mouth every evening.       No current facility-administered medications for this visit.        Physical Exam:  Vitals:    05/02/18 1417   BP: 140/90   Pulse: 96   SpO2: 92%   Weight: 107 kg (236 lb)   Height: 1.727 m (5' 8\")     General appearance: NAD, conversant   Eyes: anicteric sclerae, moist conjunctivae; no lid-lag; PERRLA  HENT: Atraumatic; oropharynx clear with moist mucous membranes and no mucosal ulcerations; normal hard and soft palate  Neck: Trachea midline; FROM, supple, no thyromegaly or " lymphadenopathy  Lungs: CTA, with normal respiratory effort and no intercostal retractions  CV: RRR, no MRGs, no JVD  Abdomen: Soft, non-tender; no masses or HSM  Extremities: No peripheral edema or extremity lymphadenopathy  Skin: Normal temperature, turgor and texture; no rash, ulcers or subcutaneous nodules  Psych: Appropriate affect, alert and oriented to person, place and time    Data:  Labs reviewed    Prior echo/stress reviewed:  Normal echo/stress    EKG interpreted by me:  SARAH, HELLEN    Impression/Plan:  1. Atrial fibrillation, unspecified type (HCC)  EKG   2. Encounter for monitoring flecainide therapy     3. Essential hypertension     4. Chronic anticoagulation       -He is doing well on Flecainide  -QRS on EKG with RBBB pattern looks stable  -He should ideally be on a tianna agent with the IC agent  -Will restart dilt for him at 120 qdaily  -Continue Eliquis  -F/u in 12 months    Johnny Garcia MD

## 2018-05-10 ENCOUNTER — TELEPHONE (OUTPATIENT)
Dept: CARDIOLOGY | Facility: MEDICAL CENTER | Age: 63
End: 2018-05-10

## 2018-05-10 NOTE — TELEPHONE ENCOUNTER
----- Message from Lucille Calabrese sent at 5/10/2018 10:23 AM PDT -----  Regarding: Patient wants to get samples of Eliquis  AMIRA/Cindy    Patient wants to get samples of Eliquis and wants a call back at [phone].

## 2018-06-11 ENCOUNTER — TELEPHONE (OUTPATIENT)
Dept: CARDIOLOGY | Facility: MEDICAL CENTER | Age: 63
End: 2018-06-11

## 2018-06-11 NOTE — TELEPHONE ENCOUNTER
Spoke with pt. Eliquis sample refill sent to [facility].   Pt. Noted he has been more fatigued and wondered if it was due to Diltiazem 120mg which Dr. Garcia restarted at 5-2-18 [facility]. Pt. doesn't sense when he is in A Fib.  Scheduled pt. To see Nuha on 7-5-18.  To Dr. Garcia for any recommendations in the interim.

## 2018-06-11 NOTE — TELEPHONE ENCOUNTER
----- Message from Ana Boateng sent at 6/11/2018  8:40 AM PDT -----  Regarding: eliquis samples  Contact: [phone]  AMIRA/kelsi    Pt calling for more samples of apixaban (ELIQUIS) 5mg.     Please call pt at

## 2018-06-12 NOTE — TELEPHONE ENCOUNTER
Pt. Called back. Pt. Reported dizziness and fatigue has worsened. He requested appointment this week; scheduled pt. To see Lilli on Thurs.

## 2018-06-14 ENCOUNTER — OFFICE VISIT (OUTPATIENT)
Dept: CARDIOLOGY | Facility: MEDICAL CENTER | Age: 63
End: 2018-06-14
Payer: MEDICARE

## 2018-06-14 VITALS
WEIGHT: 238 LBS | HEART RATE: 82 BPM | BODY MASS INDEX: 36.07 KG/M2 | SYSTOLIC BLOOD PRESSURE: 108 MMHG | OXYGEN SATURATION: 92 % | DIASTOLIC BLOOD PRESSURE: 64 MMHG | HEIGHT: 68 IN

## 2018-06-14 DIAGNOSIS — R73.9 HYPERGLYCEMIA: ICD-10-CM

## 2018-06-14 DIAGNOSIS — E66.01 CLASS 2 SEVERE OBESITY DUE TO EXCESS CALORIES WITH SERIOUS COMORBIDITY AND BODY MASS INDEX (BMI) OF 36.0 TO 36.9 IN ADULT (HCC): ICD-10-CM

## 2018-06-14 DIAGNOSIS — I50.21 ACUTE SYSTOLIC HEART FAILURE (HCC): ICD-10-CM

## 2018-06-14 DIAGNOSIS — I10 ESSENTIAL HYPERTENSION: ICD-10-CM

## 2018-06-14 DIAGNOSIS — I48.0 PAROXYSMAL ATRIAL FIBRILLATION (HCC): ICD-10-CM

## 2018-06-14 PROBLEM — N18.30 CKD (CHRONIC KIDNEY DISEASE), STAGE III: Status: RESOLVED | Noted: 2017-12-08 | Resolved: 2018-06-14

## 2018-06-14 PROBLEM — N17.9 AKI (ACUTE KIDNEY INJURY) (HCC): Status: RESOLVED | Noted: 2017-10-05 | Resolved: 2018-06-14

## 2018-06-14 PROBLEM — E87.0 HYPERNATREMIA: Status: RESOLVED | Noted: 2017-10-05 | Resolved: 2018-06-14

## 2018-06-14 PROBLEM — E66.812 CLASS 2 SEVERE OBESITY DUE TO EXCESS CALORIES WITH SERIOUS COMORBIDITY AND BODY MASS INDEX (BMI) OF 36.0 TO 36.9 IN ADULT (HCC): Status: ACTIVE | Noted: 2018-06-14

## 2018-06-14 LAB — EKG IMPRESSION: NORMAL

## 2018-06-14 PROCEDURE — 99214 OFFICE O/P EST MOD 30 MIN: CPT | Performed by: NURSE PRACTITIONER

## 2018-06-14 PROCEDURE — 93000 ELECTROCARDIOGRAM COMPLETE: CPT | Performed by: NURSE PRACTITIONER

## 2018-06-14 RX ORDER — DILTIAZEM HYDROCHLORIDE 60 MG/1
60 TABLET, FILM COATED ORAL 2 TIMES DAILY
Qty: 60 TAB | Refills: 11 | Status: SHIPPED | OUTPATIENT
Start: 2018-06-14 | End: 2018-10-11 | Stop reason: SDUPTHER

## 2018-06-14 ASSESSMENT — ENCOUNTER SYMPTOMS
FEVER: 0
COUGH: 0
CLAUDICATION: 0
PND: 0
MYALGIAS: 0
ORTHOPNEA: 0
SHORTNESS OF BREATH: 1
ABDOMINAL PAIN: 0
PALPITATIONS: 0

## 2018-06-14 NOTE — LETTER
"     [facility]   [address], [address]  [place] [address]  Phone: [phone]  Fax: [phone]              Venkata David  1955    Encounter Date: 6/14/2018    YAN Henderson          PROGRESS NOTE:  Chief Complaint   Patient presents with   • Atrial Fibrillation     Subjective:   Venkata David is a 62 y.o. male who presents today for follow up on worsening lightheadedness and \"off feeling\" with starting diltiazem.    He is a patient of Dr. Garcia in our office. Hx of new onset afib in Dec '17 with rEF of 45%.    He lives in [place], [place].    He has chronic lower extremity edema that he said has been going on for the last year alongside exertional dyspnea and chest tightness with no radiation.    He does have trouble laying flat at night.    Past Medical History:   Diagnosis Date   • Bowel habit changes     CONSTIPATION   • CHF (congestive heart failure) (HCC)     rEF of 45%   • Disorder of thyroid     AS A CHILD   • Hepatitis C 1980   • Hypertension    • Indigestion    • Paroxysmal atrial fibrillation (HCC)    • Psychiatric problem    • Renal disorder     KIDNEY FAILURE EARLY PT.   • Urinary bladder disorder     ENLARGED PROSTATE     Past Surgical History:   Procedure Laterality Date   • TRANS URETHRAL RESECTION PROSTATE  12/8/2017    Procedure: TRANS URETHRAL RESECTION PROSTATE;  Surgeon: Jonny Chávez M.D.;  Location: SURGERY [facility];  Service: Urology   • INGUINAL HERNIA REPAIR CHILD  as child     History reviewed. No pertinent family history.  Social History     Social History   • Marital status:      Spouse name: N/A   • Number of children: N/A   • Years of education: N/A     Occupational History   • Not on file.     Social History Main Topics   • Smoking status: Current Some Day Smoker     Packs/day: 0.25   • Smokeless tobacco: Never Used      Comment: less 5 cigarettes in daily    • Alcohol use No   • Drug use: No   • " "Sexual activity: Not on file     Other Topics Concern   • Not on file     Social History Narrative   • No narrative on file     No Known Allergies  Outpatient Encounter Prescriptions as of 6/14/2018   Medication Sig Dispense Refill   • DILTIAZem (CARDIZEM) 60 MG Tab Take 1 Tab by mouth 2 Times a Day. 60 Tab 11   • apixaban (ELIQUIS) 5mg Tab Take 1 Tab by mouth 2 Times a Day. 60 Tab 0   • flecainide (TAMBOCOR) 50 MG tablet Take 1 Tab by mouth 2 times a day. 60 Tab 3   • lansoprazole (PREVACID) 30 MG CAPSULE DELAYED RELEASE Take 30 mg by mouth as needed.     • [DISCONTINUED] DILTIAZem CD (CARDIZEM CD) 120 MG CAPSULE SR 24 HR Take 1 Cap by mouth every day. 30 Cap 11   • [DISCONTINUED] finasteride (PROSCAR) 5 MG Tab Take 5 mg by mouth every evening.       No facility-administered encounter medications on file as of 6/14/2018.      Review of Systems   Constitutional: Negative for fever and malaise/fatigue.   Respiratory: Positive for shortness of breath. Negative for cough.    Cardiovascular: Positive for chest pain and leg swelling. Negative for palpitations, orthopnea, claudication and PND.   Gastrointestinal: Negative for abdominal pain.   Musculoskeletal: Negative for myalgias.        Objective:   /64   Pulse 82   Ht 1.727 m (5' 8\")   Wt 108 kg (238 lb)   SpO2 92%   BMI 36.19 kg/m²      Physical Exam   Constitutional: He appears well-developed and well-nourished.   HENT:   Head: Normocephalic and atraumatic.   Eyes: EOM are normal.   Neck: No JVD present.   Cardiovascular: Normal rate, regular rhythm, normal heart sounds and intact distal pulses.    Pulmonary/Chest: Effort normal and breath sounds normal.   Musculoskeletal: Normal range of motion. He exhibits edema.   Bilateral LE edema 3+    Skin: Skin is warm and dry.   Psychiatric: He has a normal mood and affect.   Nursing note and vitals reviewed.      Assessment:     1. Essential hypertension     2. Paroxysmal atrial fibrillation (HCC)  EKG    " ECHOCARDIOGRAM COMP W/O CONT    MAGNESIUM    THYROID PANEL WITH TSH    CBC WITHOUT DIFFERENTIAL   3. Hyperglycemia  COMP METABOLIC PANEL    LIPID PANEL    HEMOGLOBIN A1C   4. Class 2 severe obesity due to excess calories with serious comorbidity and body mass index (BMI) of 36.0 to 36.9 in adult (HCC)  COMP METABOLIC PANEL    LIPID PANEL    HEMOGLOBIN A1C   5. Acute systolic heart failure (LT[facility], located [facility])  ECHOCARDIOGRAM COMP W/O CONT    BTYPE NATRIURETIC PEPTIDE     Medical Decision Making:  Today's Assessment / Status / Plan:     1. PAF  -SR on EKG  -QTC WNL on flec  -cont dilt, but drop dose with hypotension and dizziness  -cont eliquis for OAC    2. HTN  -good control on dilt  -follow BP reading at apts    3. Obesity and hyperglycemia  -check labs  -discussed importance of dietary control and exercise    4. Acute systolic reduction in EF to 45%  -most likely tachycardia induced but does have signs of HF today in apt including orthopnea, chest tightness, RAMOS, and edema  -repeat echo for structure and function  -cont dilt for now, consider bb therapy alongside HF medications up titration with diuretic therapy if warranted with echo results  -BNP and CMP ordered    FU in clinic in 1 month with any provider in [place]; echo and labs    Patient verbalizes understanding and agrees with the plan of care.     Collaborating MD: Julia LANGE        No Recipients

## 2018-06-14 NOTE — PROGRESS NOTES
"Chief Complaint   Patient presents with   • Atrial Fibrillation     Subjective:   Venkata David is a 62 y.o. male who presents today for follow up on worsening lightheadedness and \"off feeling\" with starting diltiazem.    He is a patient of Dr. Garcia in our office. Hx of new onset afib in Dec '17 with rEF of 45%.    He lives in [place], [place].    He has chronic lower extremity edema that he said has been going on for the last year alongside exertional dyspnea and chest tightness with no radiation.    He does have trouble laying flat at night.    Past Medical History:   Diagnosis Date   • Bowel habit changes     CONSTIPATION   • CHF (congestive heart failure) (HCC)     rEF of 45%   • Disorder of thyroid     AS A CHILD   • Hepatitis C 1980   • Hypertension    • Indigestion    • Paroxysmal atrial fibrillation (HCC)    • Psychiatric problem    • Renal disorder     KIDNEY FAILURE EARLY PT.   • Urinary bladder disorder     ENLARGED PROSTATE     Past Surgical History:   Procedure Laterality Date   • TRANS URETHRAL RESECTION PROSTATE  12/8/2017    Procedure: TRANS URETHRAL RESECTION PROSTATE;  Surgeon: Jonny Chávez M.D.;  Location: SURGERY [facility];  Service: Urology   • INGUINAL HERNIA REPAIR CHILD  as child     History reviewed. No pertinent family history.  Social History     Social History   • Marital status:      Spouse name: N/A   • Number of children: N/A   • Years of education: N/A     Occupational History   • Not on file.     Social History Main Topics   • Smoking status: Current Some Day Smoker     Packs/day: 0.25   • Smokeless tobacco: Never Used      Comment: less 5 cigarettes in daily    • Alcohol use No   • Drug use: No   • Sexual activity: Not on file     Other Topics Concern   • Not on file     Social History Narrative   • No narrative on file     No Known Allergies  Outpatient Encounter Prescriptions as of 6/14/2018   Medication Sig Dispense Refill   • DILTIAZem (CARDIZEM) 60 MG Tab Take 1 " "Tab by mouth 2 Times a Day. 60 Tab 11   • apixaban (ELIQUIS) 5mg Tab Take 1 Tab by mouth 2 Times a Day. 60 Tab 0   • flecainide (TAMBOCOR) 50 MG tablet Take 1 Tab by mouth 2 times a day. 60 Tab 3   • lansoprazole (PREVACID) 30 MG CAPSULE DELAYED RELEASE Take 30 mg by mouth as needed.     • [DISCONTINUED] DILTIAZem CD (CARDIZEM CD) 120 MG CAPSULE SR 24 HR Take 1 Cap by mouth every day. 30 Cap 11   • [DISCONTINUED] finasteride (PROSCAR) 5 MG Tab Take 5 mg by mouth every evening.       No facility-administered encounter medications on file as of 6/14/2018.      Review of Systems   Constitutional: Negative for fever and malaise/fatigue.   Respiratory: Positive for shortness of breath. Negative for cough.    Cardiovascular: Positive for chest pain and leg swelling. Negative for palpitations, orthopnea, claudication and PND.   Gastrointestinal: Negative for abdominal pain.   Musculoskeletal: Negative for myalgias.        Objective:   /64   Pulse 82   Ht 1.727 m (5' 8\")   Wt 108 kg (238 lb)   SpO2 92%   BMI 36.19 kg/m²     Physical Exam   Constitutional: He appears well-developed and well-nourished.   HENT:   Head: Normocephalic and atraumatic.   Eyes: EOM are normal.   Neck: No JVD present.   Cardiovascular: Normal rate, regular rhythm, normal heart sounds and intact distal pulses.    Pulmonary/Chest: Effort normal and breath sounds normal.   Musculoskeletal: Normal range of motion. He exhibits edema.   Bilateral LE edema 3+    Skin: Skin is warm and dry.   Psychiatric: He has a normal mood and affect.   Nursing note and vitals reviewed.      Assessment:     1. Essential hypertension     2. Paroxysmal atrial fibrillation (HCC)  EKG    ECHOCARDIOGRAM COMP W/O CONT    MAGNESIUM    THYROID PANEL WITH TSH    CBC WITHOUT DIFFERENTIAL   3. Hyperglycemia  COMP METABOLIC PANEL    LIPID PANEL    HEMOGLOBIN A1C   4. Class 2 severe obesity due to excess calories with serious comorbidity and body mass index (BMI) of 36.0 " to 36.9 in adult (HCC)  COMP METABOLIC PANEL    LIPID PANEL    HEMOGLOBIN A1C   5. Acute systolic heart failure (HCC)  ECHOCARDIOGRAM COMP W/O CONT    BTYPE NATRIURETIC PEPTIDE     Medical Decision Making:  Today's Assessment / Status / Plan:     1. PAF  -SR on EKG  -QTC WNL on flec  -cont dilt, but drop dose with hypotension and dizziness  -cont eliquis for OAC    2. HTN  -good control on dilt  -follow BP reading at apts    3. Obesity and hyperglycemia  -check labs  -discussed importance of dietary control and exercise    4. Acute systolic reduction in EF to 45%  -most likely tachycardia induced but does have signs of HF today in apt including orthopnea, chest tightness, RAMOS, and edema  -repeat echo for structure and function  -cont dilt for now, consider bb therapy alongside HF medications up titration with diuretic therapy if warranted with echo results  -BNP and CMP ordered    FU in clinic in 1 month with any provider in [place]; echo and labs    Patient verbalizes understanding and agrees with the plan of care.     Collaborating MD: Julia LANGE

## 2018-07-09 ENCOUNTER — TELEPHONE (OUTPATIENT)
Dept: CARDIOLOGY | Facility: MEDICAL CENTER | Age: 63
End: 2018-07-09

## 2018-07-09 NOTE — TELEPHONE ENCOUNTER
Called pt. To advise that sample refill has been sent.   He has FV in August with Hansa. Lilli ordered labs and an echo, but copay=$500 for echo and he can't afford it. He will do labs before FV with Hansa.

## 2018-07-09 NOTE — TELEPHONE ENCOUNTER
Message   Received: Today   Message Contents   YAN Henderson L.P.N.   Caller: Unspecified (Today,  4:05 PM)             He can discuss this with Hansa at  apt. Thanks for update :)

## 2018-07-09 NOTE — TELEPHONE ENCOUNTER
----- Message from Lucille Calabrese sent at 7/9/2018  3:00 PM PDT -----  Regarding: Patient needs samples of Eliquis  AMIRA/Cindy    Patient wants a call back at [phone] about getting samples of Eliquis.

## 2018-08-09 ENCOUNTER — TELEPHONE (OUTPATIENT)
Dept: CARDIOLOGY | Facility: MEDICAL CENTER | Age: 63
End: 2018-08-09

## 2018-08-09 NOTE — TELEPHONE ENCOUNTER
----- Message from Risa Bush sent at 8/9/2018  1:16 PM PDT -----  Regarding: samples of Eliquis  AMIRA/Cindy      He is calling for samples of Eliquis. He can be reached at [phone].

## 2018-08-15 ENCOUNTER — TELEPHONE (OUTPATIENT)
Dept: CARDIOLOGY | Facility: MEDICAL CENTER | Age: 63
End: 2018-08-15

## 2018-08-15 DIAGNOSIS — I50.21 ACUTE SYSTOLIC HEART FAILURE (HCC): ICD-10-CM

## 2018-08-15 DIAGNOSIS — R73.9 HYPERGLYCEMIA: ICD-10-CM

## 2018-08-15 DIAGNOSIS — E66.01 CLASS 2 SEVERE OBESITY DUE TO EXCESS CALORIES WITH SERIOUS COMORBIDITY AND BODY MASS INDEX (BMI) OF 36.0 TO 36.9 IN ADULT (HCC): ICD-10-CM

## 2018-08-15 DIAGNOSIS — I48.0 PAROXYSMAL ATRIAL FIBRILLATION (HCC): ICD-10-CM

## 2018-08-15 LAB — HBA1C MFR BLD: 6.5 % (ref ?–5.8)

## 2018-08-15 NOTE — TELEPHONE ENCOUNTER
Mignon from [facility] called asked for an additional diagnosis code for medicare to cover BNP.     HTN (I.10) went through

## 2018-08-20 ENCOUNTER — TELEPHONE (OUTPATIENT)
Dept: CARDIOLOGY | Facility: MEDICAL CENTER | Age: 63
End: 2018-08-20

## 2018-08-23 ENCOUNTER — OFFICE VISIT (OUTPATIENT)
Dept: CARDIOLOGY | Facility: PHYSICIAN GROUP | Age: 63
End: 2018-08-23
Payer: MEDICARE

## 2018-08-23 VITALS
WEIGHT: 240 LBS | HEART RATE: 80 BPM | SYSTOLIC BLOOD PRESSURE: 132 MMHG | OXYGEN SATURATION: 95 % | DIASTOLIC BLOOD PRESSURE: 70 MMHG | HEIGHT: 68 IN | BODY MASS INDEX: 36.37 KG/M2

## 2018-08-23 DIAGNOSIS — I48.0 PAROXYSMAL ATRIAL FIBRILLATION (HCC): ICD-10-CM

## 2018-08-23 DIAGNOSIS — R60.0 LOCALIZED EDEMA: ICD-10-CM

## 2018-08-23 DIAGNOSIS — Z79.01 CHRONIC ANTICOAGULATION: ICD-10-CM

## 2018-08-23 DIAGNOSIS — E78.2 MIXED HYPERLIPIDEMIA: ICD-10-CM

## 2018-08-23 DIAGNOSIS — I10 ESSENTIAL HYPERTENSION: ICD-10-CM

## 2018-08-23 PROBLEM — E78.5 HYPERLIPIDEMIA: Status: ACTIVE | Noted: 2018-08-23

## 2018-08-23 PROCEDURE — 99214 OFFICE O/P EST MOD 30 MIN: CPT | Performed by: NURSE PRACTITIONER

## 2018-08-23 RX ORDER — POTASSIUM CHLORIDE 20 MEQ/1
20 TABLET, EXTENDED RELEASE ORAL DAILY
Qty: 30 TAB | Refills: 3 | Status: SHIPPED | OUTPATIENT
Start: 2018-08-23 | End: 2019-02-21

## 2018-08-23 RX ORDER — ATORVASTATIN CALCIUM 20 MG/1
20 TABLET, FILM COATED ORAL DAILY
Qty: 30 TAB | Refills: 3 | Status: SHIPPED | OUTPATIENT
Start: 2018-08-23 | End: 2019-02-21 | Stop reason: SDUPTHER

## 2018-08-23 RX ORDER — FUROSEMIDE 40 MG/1
40 TABLET ORAL DAILY
Qty: 30 TAB | Refills: 3 | Status: SHIPPED | OUTPATIENT
Start: 2018-08-23 | End: 2019-02-21 | Stop reason: SDUPTHER

## 2018-08-23 ASSESSMENT — ENCOUNTER SYMPTOMS
PND: 0
NAUSEA: 0
DIZZINESS: 0
ABDOMINAL PAIN: 0
PALPITATIONS: 0
LOSS OF CONSCIOUSNESS: 0
CHILLS: 0
COUGH: 0
HEADACHES: 0
BRUISES/BLEEDS EASILY: 0
SHORTNESS OF BREATH: 1
ORTHOPNEA: 0
MYALGIAS: 0
FEVER: 0

## 2018-08-23 NOTE — LETTER
[facility] [address]  Phone: [phone]  Fax: [phone]              Venkata David  1955    Encounter Date: 8/23/2018    SAMARIA Ayala          PROGRESS NOTE:  Chief Complaint   Patient presents with   • Atrial Fibrillation       Subjective:   Venkata David is a 63 y.o. male who presents today for two month follow-up of edema and paroxysmal atrial fibrillation.    Venkata is a 63 year old male with history of paroxysmal atrial fibrillation on Flecainide and Cardizem, anticoagulation with Eliquis, hypertension, hyperlipidemia, and chronic LE edema, normally followed by Dr. Garcia and JAM Veliz, and last seen in June 2018.    Since then, he had bloodwork, but did not have repeat echo due to cost. He does still have pretty severe LE edema; breathing seems to be stable, but he is not terribly active. No chest pain, pressure or discomfort; some mild orthopnea, but no PND. Ongoing lightheadedness, but no syncope or presyncope.     Past Medical History:   Diagnosis Date   • Bowel habit changes     CONSTIPATION   • CHF (congestive heart failure) (HCC) 12/2017    Echocardiogram with rEF of 45%   • Chronic anticoagulation    • Disorder of thyroid     As a child only   • Hepatitis C 1980   • Hyperglycemia    • Hyperlipidemia    • Hypertension    • Indigestion    • Obesity    • Paroxysmal atrial fibrillation (HCC) 12/2017    Echocardiogram with normal LV size, LVEF 45%. Severely dilated RV. Mild MR, trace AI, moderate TR. RVSP 30mmHg.   • Psychiatric problem    • Renal disorder     KIDNEY FAILURE EARLY PT.   • Urinary bladder disorder     ENLARGED PROSTATE     Past Surgical History:   Procedure Laterality Date   • TRANS URETHRAL RESECTION PROSTATE  12/8/2017    Procedure: TRANS URETHRAL RESECTION PROSTATE;  Surgeon: Jonny Chávez M.D.;  Location: SURGERY [facility];  Service: Urology   • INGUINAL  HERNIA REPAIR CHILD  as child     History reviewed. No pertinent family history.  Social History     Social History   • Marital status:      Spouse name: N/A   • Number of children: N/A   • Years of education: N/A     Occupational History   • Not on file.     Social History Main Topics   • Smoking status: Former Smoker     Packs/day: 0.25     Quit date: 6/6/2018   • Smokeless tobacco: Never Used   • Alcohol use No   • Drug use: No   • Sexual activity: Not on file     Other Topics Concern   • Not on file     Social History Narrative   • No narrative on file     No Known Allergies  Outpatient Encounter Prescriptions as of 8/23/2018   Medication Sig Dispense Refill   • furosemide (LASIX) 40 MG Tab Take 1 Tab by mouth every day. 30 Tab 3   • potassium chloride SA (KDUR) 20 MEQ Tab CR Take 1 Tab by mouth every day. 30 Tab 3   • atorvastatin (LIPITOR) 20 MG Tab Take 1 Tab by mouth every day. 30 Tab 3   • apixaban (ELIQUIS) 5mg Tab Take 1 Tab by mouth 2 Times a Day. 60 Tab 0   • DILTIAZem (CARDIZEM) 60 MG Tab Take 1 Tab by mouth 2 Times a Day. (Patient taking differently: Take 120 mg by mouth every day.) 60 Tab 11   • flecainide (TAMBOCOR) 50 MG tablet Take 1 Tab by mouth 2 times a day. 60 Tab 3   • lansoprazole (PREVACID) 30 MG CAPSULE DELAYED RELEASE Take 30 mg by mouth as needed.       No facility-administered encounter medications on file as of 8/23/2018.      Review of Systems   Constitutional: Positive for malaise/fatigue. Negative for chills and fever.   HENT: Negative for congestion.    Respiratory: Positive for shortness of breath. Negative for cough.         With exertion.   Cardiovascular: Positive for leg swelling. Negative for chest pain, palpitations, orthopnea and PND.        Chronic, pretty severe   Gastrointestinal: Negative for abdominal pain and nausea.   Musculoskeletal: Negative for myalgias.   Skin: Negative for rash.   Neurological: Negative for dizziness, loss of consciousness and headaches.  "  Endo/Heme/Allergies: Does not bruise/bleed easily.        Objective:   /70   Pulse 80   Ht 1.727 m (5' 8\")   Wt 108.9 kg (240 lb)   SpO2 95%   BMI 36.49 kg/m²      Physical Exam   Constitutional: He is oriented to person, place, and time. He appears well-developed and well-nourished.   He is obese (BMI 36.49)   HENT:   Head: Normocephalic.   Eyes: EOM are normal.   Neck: Normal range of motion. Neck supple. No JVD present.   Cardiovascular: Normal rate, regular rhythm and normal heart sounds.    Pulmonary/Chest: Effort normal and breath sounds normal. No respiratory distress. He has no wheezes. He has no rales.   Abdominal: Soft. Bowel sounds are normal. He exhibits no distension. There is no tenderness.   Musculoskeletal: Normal range of motion. He exhibits edema.   3+ edema to just below the knees bilaterally.   Neurological: He is alert and oriented to person, place, and time.   Skin: Skin is warm and dry. No rash noted.   Psychiatric: He has a normal mood and affect.     LABS AS OF 8/15/2018:  Glucose 109  BUN 17  Creatinine 1.09  GFR >60  Potassium 3.8  Magnesium 2.1  AST 15  ALT 28  Cholesterol 229  Triglycerides 119  HDL 39    Cholesterol/HDL ratio 5.9  CBC stable/ normal  BNP 46  HgbA1c 6.5    CONCLUSIONS OF ECHOCARDIOGRAM OF 12/9/2017:  No prior study is available for comparison.   Mildly reduced left ventricular systolic function.  Left ventricular ejection fraction is visually estimated to be 45%.  Mild mitral regurgitation.  Moderate tricuspid regurgitation.  Estimated right ventricular systolic pressure is 30 mmHg.    Assessment:     1. Paroxysmal atrial fibrillation (HCC)     2. Chronic anticoagulation     3. Essential hypertension     4. Mixed hyperlipidemia  atorvastatin (LIPITOR) 20 MG Tab    COMP METABOLIC PANEL    LIPID PROFILE   5. Localized edema  furosemide (LASIX) 40 MG Tab    potassium chloride SA (KDUR) 20 MEQ Tab CR       Medical Decision Making:  Today's Assessment / " Status / Plan:     1. Paroxysmal atrial fibrillation, in sinus rhythm on Flecainide and Diltaizem.    2. Chronic anticoagulation with Eliquis, tolerating without any bleeding problems.    3. Hypertension, treated and stable.    4. Hyperlipidemia, not currently on any therapy. Start Lipitor 20mg once daily. Repeat CMP and lipid panel in 1 month.    5. Edema, related to moderate TR; BNP was normal. Start Lasix 40mg and KCL 20mEq once daily. BMP in 3-4 weeks. Watch salt intake.    Plan as above: labs in 1 month. FU with me in 6 weeks for recheck. FU sooner if clinical condition changes.    Collaborating MD: Jagdish Chaudhary

## 2018-08-23 NOTE — PROGRESS NOTES
Chief Complaint   Patient presents with   • Atrial Fibrillation       Subjective:   Venkata David is a 63 y.o. male who presents today for two month follow-up of edema and paroxysmal atrial fibrillation.    Venkata is a 63 year old male with history of paroxysmal atrial fibrillation on Flecainide and Cardizem, anticoagulation with Eliquis, hypertension, hyperlipidemia, and chronic LE edema, normally followed by Dr. Garcia and JAM Veliz, and last seen in June 2018.    Since then, he had bloodwork, but did not have repeat echo due to cost. He does still have pretty severe LE edema; breathing seems to be stable, but he is not terribly active. No chest pain, pressure or discomfort; some mild orthopnea, but no PND. Ongoing lightheadedness, but no syncope or presyncope.     Past Medical History:   Diagnosis Date   • Bowel habit changes     CONSTIPATION   • CHF (congestive heart failure) (HCC) 12/2017    Echocardiogram with rEF of 45%   • Chronic anticoagulation    • Disorder of thyroid     As a child only   • Hepatitis C 1980   • Hyperglycemia    • Hyperlipidemia    • Hypertension    • Indigestion    • Obesity    • Paroxysmal atrial fibrillation (HCC) 12/2017    Echocardiogram with normal LV size, LVEF 45%. Severely dilated RV. Mild MR, trace AI, moderate TR. RVSP 30mmHg.   • Psychiatric problem    • Renal disorder     KIDNEY FAILURE EARLY PT.   • Urinary bladder disorder     ENLARGED PROSTATE     Past Surgical History:   Procedure Laterality Date   • TRANS URETHRAL RESECTION PROSTATE  12/8/2017    Procedure: TRANS URETHRAL RESECTION PROSTATE;  Surgeon: Jonny Chávez M.D.;  Location: SURGERY [facility];  Service: Urology   • INGUINAL HERNIA REPAIR CHILD  as child     History reviewed. No pertinent family history.  Social History     Social History   • Marital status:      Spouse name: N/A   • Number of children: N/A   • Years of education: N/A     Occupational History   • Not on file.  "    Social History Main Topics   • Smoking status: Former Smoker     Packs/day: 0.25     Quit date: 6/6/2018   • Smokeless tobacco: Never Used   • Alcohol use No   • Drug use: No   • Sexual activity: Not on file     Other Topics Concern   • Not on file     Social History Narrative   • No narrative on file     No Known Allergies  Outpatient Encounter Prescriptions as of 8/23/2018   Medication Sig Dispense Refill   • furosemide (LASIX) 40 MG Tab Take 1 Tab by mouth every day. 30 Tab 3   • potassium chloride SA (KDUR) 20 MEQ Tab CR Take 1 Tab by mouth every day. 30 Tab 3   • atorvastatin (LIPITOR) 20 MG Tab Take 1 Tab by mouth every day. 30 Tab 3   • apixaban (ELIQUIS) 5mg Tab Take 1 Tab by mouth 2 Times a Day. 60 Tab 0   • DILTIAZem (CARDIZEM) 60 MG Tab Take 1 Tab by mouth 2 Times a Day. (Patient taking differently: Take 120 mg by mouth every day.) 60 Tab 11   • flecainide (TAMBOCOR) 50 MG tablet Take 1 Tab by mouth 2 times a day. 60 Tab 3   • lansoprazole (PREVACID) 30 MG CAPSULE DELAYED RELEASE Take 30 mg by mouth as needed.       No facility-administered encounter medications on file as of 8/23/2018.      Review of Systems   Constitutional: Positive for malaise/fatigue. Negative for chills and fever.   HENT: Negative for congestion.    Respiratory: Positive for shortness of breath. Negative for cough.         With exertion.   Cardiovascular: Positive for leg swelling. Negative for chest pain, palpitations, orthopnea and PND.        Chronic, pretty severe   Gastrointestinal: Negative for abdominal pain and nausea.   Musculoskeletal: Negative for myalgias.   Skin: Negative for rash.   Neurological: Negative for dizziness, loss of consciousness and headaches.   Endo/Heme/Allergies: Does not bruise/bleed easily.        Objective:   /70   Pulse 80   Ht 1.727 m (5' 8\")   Wt 108.9 kg (240 lb)   SpO2 95%   BMI 36.49 kg/m²     Physical Exam   Constitutional: He is oriented to person, place, and time. He appears " well-developed and well-nourished.   He is obese (BMI 36.49)   HENT:   Head: Normocephalic.   Eyes: EOM are normal.   Neck: Normal range of motion. Neck supple. No JVD present.   Cardiovascular: Normal rate, regular rhythm and normal heart sounds.    Pulmonary/Chest: Effort normal and breath sounds normal. No respiratory distress. He has no wheezes. He has no rales.   Abdominal: Soft. Bowel sounds are normal. He exhibits no distension. There is no tenderness.   Musculoskeletal: Normal range of motion. He exhibits edema.   3+ edema to just below the knees bilaterally.   Neurological: He is alert and oriented to person, place, and time.   Skin: Skin is warm and dry. No rash noted.   Psychiatric: He has a normal mood and affect.     LABS AS OF 8/15/2018:  Glucose 109  BUN 17  Creatinine 1.09  GFR >60  Potassium 3.8  Magnesium 2.1  AST 15  ALT 28  Cholesterol 229  Triglycerides 119  HDL 39    Cholesterol/HDL ratio 5.9  CBC stable/ normal  BNP 46  HgbA1c 6.5    CONCLUSIONS OF ECHOCARDIOGRAM OF 12/9/2017:  No prior study is available for comparison.   Mildly reduced left ventricular systolic function.  Left ventricular ejection fraction is visually estimated to be 45%.  Mild mitral regurgitation.  Moderate tricuspid regurgitation.  Estimated right ventricular systolic pressure is 30 mmHg.    Assessment:     1. Paroxysmal atrial fibrillation (HCC)     2. Chronic anticoagulation     3. Essential hypertension     4. Mixed hyperlipidemia  atorvastatin (LIPITOR) 20 MG Tab    COMP METABOLIC PANEL    LIPID PROFILE   5. Localized edema  furosemide (LASIX) 40 MG Tab    potassium chloride SA (KDUR) 20 MEQ Tab CR       Medical Decision Making:  Today's Assessment / Status / Plan:     1. Paroxysmal atrial fibrillation, in sinus rhythm on Flecainide and Diltaizem.    2. Chronic anticoagulation with Eliquis, tolerating without any bleeding problems.    3. Hypertension, treated and stable.    4. Hyperlipidemia, not currently on  any therapy. Start Lipitor 20mg once daily. Repeat CMP and lipid panel in 1 month.    5. Edema, related to moderate TR; BNP was normal. Start Lasix 40mg and KCL 20mEq once daily. BMP in 3-4 weeks. Watch salt intake.    Plan as above: labs in 1 month. FU with me in 6 weeks for recheck. FU sooner if clinical condition changes.    Collaborating MD: Jagdish

## 2018-08-25 DIAGNOSIS — I48.0 PAF (PAROXYSMAL ATRIAL FIBRILLATION) (HCC): ICD-10-CM

## 2018-08-27 RX ORDER — FLECAINIDE ACETATE 50 MG/1
TABLET ORAL
Qty: 60 TAB | Refills: 6 | Status: SHIPPED | OUTPATIENT
Start: 2018-08-27 | End: 2019-04-16 | Stop reason: SDUPTHER

## 2018-09-11 ENCOUNTER — TELEPHONE (OUTPATIENT)
Dept: CARDIOLOGY | Facility: MEDICAL CENTER | Age: 63
End: 2018-09-11

## 2018-09-11 NOTE — TELEPHONE ENCOUNTER
----- Message from Ana Boateng sent at 9/11/2018  3:42 PM PDT -----  Regarding: eliquis samples  Contact: [phone]  AMIRA/kelsi    Pt calling for more samples of apixaban (ELIQUIS) 5mg, pt has enough for this week.  Please call Venkata at [phone]

## 2018-09-11 NOTE — TELEPHONE ENCOUNTER
Spoke with pt. To advise that 1 month of samples has been sent to the [facility].

## 2018-10-11 ENCOUNTER — OFFICE VISIT (OUTPATIENT)
Dept: CARDIOLOGY | Facility: PHYSICIAN GROUP | Age: 63
End: 2018-10-11
Payer: MEDICARE

## 2018-10-11 ENCOUNTER — TELEPHONE (OUTPATIENT)
Dept: CARDIOLOGY | Facility: MEDICAL CENTER | Age: 63
End: 2018-10-11

## 2018-10-11 VITALS
WEIGHT: 239 LBS | SYSTOLIC BLOOD PRESSURE: 134 MMHG | HEART RATE: 82 BPM | HEIGHT: 68 IN | DIASTOLIC BLOOD PRESSURE: 72 MMHG | OXYGEN SATURATION: 94 % | BODY MASS INDEX: 36.22 KG/M2

## 2018-10-11 DIAGNOSIS — I48.0 PAROXYSMAL ATRIAL FIBRILLATION (HCC): ICD-10-CM

## 2018-10-11 DIAGNOSIS — Z79.01 CHRONIC ANTICOAGULATION: ICD-10-CM

## 2018-10-11 DIAGNOSIS — R60.0 LOCALIZED EDEMA: ICD-10-CM

## 2018-10-11 DIAGNOSIS — I10 ESSENTIAL HYPERTENSION: ICD-10-CM

## 2018-10-11 DIAGNOSIS — E78.2 MIXED HYPERLIPIDEMIA: ICD-10-CM

## 2018-10-11 PROCEDURE — 99214 OFFICE O/P EST MOD 30 MIN: CPT | Performed by: NURSE PRACTITIONER

## 2018-10-11 RX ORDER — DILTIAZEM HYDROCHLORIDE 60 MG/1
120 TABLET, FILM COATED ORAL DAILY
Qty: 60 TAB | Refills: 6
Start: 2018-10-11 | End: 2019-06-18 | Stop reason: SDUPTHER

## 2018-10-11 ASSESSMENT — ENCOUNTER SYMPTOMS
CHILLS: 0
NAUSEA: 0
COUGH: 0
ORTHOPNEA: 0
SHORTNESS OF BREATH: 0
LOSS OF CONSCIOUSNESS: 0
PALPITATIONS: 0
MYALGIAS: 0
HEADACHES: 0
PND: 0
ABDOMINAL PAIN: 0
BRUISES/BLEEDS EASILY: 0
DIZZINESS: 0
FEVER: 0

## 2018-10-11 NOTE — LETTER
[facility] [address]  Phone: [phone]  Fax: [phone]              Venkata David  1955    Encounter Date: 10/11/2018    SAMARIA Ayala          PROGRESS NOTE:  Chief Complaint   Patient presents with   • Follow-Up   • Edema   • Atrial Fibrillation   • Anticoagulation   • HTN (Controlled)   • Hyperlipidemia       Subjective:   Venkata David is a 63 y.o. male who presents today for six week follow-up of edema.    Venkata is a 63 year old male with history of paroxysmal atrial fibrillation on Flecainide and Cardizem, anticoagulation with Eliquis, hypertension, hyperlipidemia, and chronic LE edema, normally followed by Dr. Garcia and JAM Veliz.    At last follow-up, edema was getting worse, and I added Lasix and potassium. I also had him resume Lipitor for hyperlipidemia.    He is here today for follow-up. Edema is better, but still present, but definitely improving. Breathing is stable.  He continues to be pretty inactive. He forgot his second dose of Flecainide the last couple of days, and felt some palpitations, but he is back on the correct dose. No chest pain, pressure or discomfort; some mild orthopnea, but no PND. Ongoing lightheadedness, but no syncope or presyncope.        Past Medical History:   Diagnosis Date   • Bowel habit changes     CONSTIPATION   • CHF (congestive heart failure) (HCC) 12/2017    Echocardiogram with rEF of 45%   • Chronic anticoagulation    • Disorder of thyroid     As a child only   • Hepatitis C 1980   • Hyperglycemia    • Hyperlipidemia    • Hypertension    • Indigestion    • Obesity    • Paroxysmal atrial fibrillation (HCC) 12/2017    Echocardiogram with normal LV size, LVEF 45%. Severely dilated RV. Mild MR, trace AI, moderate TR. RVSP 30mmHg.   • Psychiatric problem    • Renal disorder     KIDNEY FAILURE EARLY PT.   • Urinary bladder disorder     ENLARGED  PROSTATE     Past Surgical History:   Procedure Laterality Date   • TRANS URETHRAL RESECTION PROSTATE  12/8/2017    Procedure: TRANS URETHRAL RESECTION PROSTATE;  Surgeon: Jonny Chávez M.D.;  Location: SURGERY [facility];  Service: Urology   • INGUINAL HERNIA REPAIR CHILD  as child     No family history on file.  Social History     Social History   • Marital status:      Spouse name: N/A   • Number of children: N/A   • Years of education: N/A     Occupational History   • Not on file.     Social History Main Topics   • Smoking status: Former Smoker     Packs/day: 0.25     Quit date: 6/6/2018   • Smokeless tobacco: Never Used   • Alcohol use No   • Drug use: No   • Sexual activity: Not on file     Other Topics Concern   • Not on file     Social History Narrative   • No narrative on file     No Known Allergies  Outpatient Encounter Prescriptions as of 10/11/2018   Medication Sig Dispense Refill   • DILTIAZem (CARDIZEM) 60 MG Tab Take 2 Tabs by mouth every day. 60 Tab 6   • apixaban (ELIQUIS) 5mg Tab Take 1 Tab by mouth 2 Times a Day. 60 Tab 0   • flecainide (TAMBOCOR) 50 MG tablet TAKE 1 TABLET BY MOUTH TWICE DAILY 60 Tab 6   • furosemide (LASIX) 40 MG Tab Take 1 Tab by mouth every day. 30 Tab 3   • potassium chloride SA (KDUR) 20 MEQ Tab CR Take 1 Tab by mouth every day. 30 Tab 3   • atorvastatin (LIPITOR) 20 MG Tab Take 1 Tab by mouth every day. 30 Tab 3   • lansoprazole (PREVACID) 30 MG CAPSULE DELAYED RELEASE Take 30 mg by mouth as needed.     • [DISCONTINUED] DILTIAZem (CARDIZEM) 60 MG Tab Take 1 Tab by mouth 2 Times a Day. (Patient taking differently: Take 120 mg by mouth every day.) 60 Tab 11     No facility-administered encounter medications on file as of 10/11/2018.      Review of Systems   Constitutional: Negative for chills, fever and malaise/fatigue.   HENT: Negative for congestion.    Respiratory: Negative for cough and shortness of breath.         With exertion.   Cardiovascular: Positive  "for leg swelling. Negative for chest pain, palpitations, orthopnea and PND.        Has improved with Lasix/KCl   Gastrointestinal: Negative for abdominal pain and nausea.   Musculoskeletal: Negative for myalgias.   Skin: Negative for rash.   Neurological: Negative for dizziness, loss of consciousness and headaches.   Endo/Heme/Allergies: Does not bruise/bleed easily.        Objective:   /72 (BP Location: Left arm, Patient Position: Sitting, BP Cuff Size: Adult)   Pulse 82   Ht 1.727 m (5' 8\")   Wt 108.4 kg (239 lb)   SpO2 94%   BMI 36.34 kg/m²      Physical Exam   Constitutional: He is oriented to person, place, and time. He appears well-developed and well-nourished.   He is obese (BMI 36.49)   HENT:   Head: Normocephalic.   Eyes: EOM are normal.   Neck: Normal range of motion. Neck supple. No JVD present.   Cardiovascular: Normal rate, regular rhythm and normal heart sounds.    Pulmonary/Chest: Effort normal and breath sounds normal. No respiratory distress. He has no wheezes. He has no rales.   Abdominal: Soft. Bowel sounds are normal. He exhibits no distension. There is no tenderness.   Musculoskeletal: Normal range of motion. He exhibits edema.   1-2+ edema to just below the knees bilaterally, improved from last visit, R>L   Neurological: He is alert and oriented to person, place, and time.   Skin: Skin is warm and dry. No rash noted.   Psychiatric: He has a normal mood and affect.     He did not get labwork done for today's visit.     Assessment:     1. Localized edema     2. Paroxysmal atrial fibrillation (HCC)  DILTIAZem (CARDIZEM) 60 MG Tab   3. Chronic anticoagulation     4. Essential hypertension     5. Mixed hyperlipidemia  COMP METABOLIC PANEL    LIPID PROFILE       Medical Decision Making:  Today's Assessment / Status / Plan:     1. Edema, improved with addition of Lasix and potassium. Continue current dose. Urged to get CMP done next week.    2. Paroxysmal atrial fibrillation, in sinus " rhythm on Flecainide and Cardizem.    3. Chronic anticoagulation with Eliquis, tolerating without any bleeding problems.    4. Hypertension, treated and stable. BP is good today.    5. Hyperlipidemia, now back on Lipitor. To get fasting CMP and lipid panel.    Same medications for now. Labs as above next week. FU in 4-6 months. FU sooner if clinical condition changes.    Collaborating MD: Kimberli Chaudhary

## 2018-10-11 NOTE — PROGRESS NOTES
Chief Complaint   Patient presents with   • Follow-Up   • Edema   • Atrial Fibrillation   • Anticoagulation   • HTN (Controlled)   • Hyperlipidemia       Subjective:   Venkata David is a 63 y.o. male who presents today for six week follow-up of edema.    Venkata is a 63 year old male with history of paroxysmal atrial fibrillation on Flecainide and Cardizem, anticoagulation with Eliquis, hypertension, hyperlipidemia, and chronic LE edema, normally followed by Dr. Garcia and JAM Veliz.    At last follow-up, edema was getting worse, and I added Lasix and potassium. I also had him resume Lipitor for hyperlipidemia.    He is here today for follow-up. Edema is better, but still present, but definitely improving. Breathing is stable.  He continues to be pretty inactive. He forgot his second dose of Flecainide the last couple of days, and felt some palpitations, but he is back on the correct dose. No chest pain, pressure or discomfort; some mild orthopnea, but no PND. Ongoing lightheadedness, but no syncope or presyncope.        Past Medical History:   Diagnosis Date   • Bowel habit changes     CONSTIPATION   • CHF (congestive heart failure) (HCC) 12/2017    Echocardiogram with rEF of 45%   • Chronic anticoagulation    • Disorder of thyroid     As a child only   • Hepatitis C 1980   • Hyperglycemia    • Hyperlipidemia    • Hypertension    • Indigestion    • Obesity    • Paroxysmal atrial fibrillation (HCC) 12/2017    Echocardiogram with normal LV size, LVEF 45%. Severely dilated RV. Mild MR, trace AI, moderate TR. RVSP 30mmHg.   • Psychiatric problem    • Renal disorder     KIDNEY FAILURE EARLY PT.   • Urinary bladder disorder     ENLARGED PROSTATE     Past Surgical History:   Procedure Laterality Date   • TRANS URETHRAL RESECTION PROSTATE  12/8/2017    Procedure: TRANS URETHRAL RESECTION PROSTATE;  Surgeon: Jonny Chávez M.D.;  Location: SURGERY [facility];  Service: Urology   • INGUINAL HERNIA REPAIR  CHILD  as child     No family history on file.  Social History     Social History   • Marital status:      Spouse name: N/A   • Number of children: N/A   • Years of education: N/A     Occupational History   • Not on file.     Social History Main Topics   • Smoking status: Former Smoker     Packs/day: 0.25     Quit date: 6/6/2018   • Smokeless tobacco: Never Used   • Alcohol use No   • Drug use: No   • Sexual activity: Not on file     Other Topics Concern   • Not on file     Social History Narrative   • No narrative on file     No Known Allergies  Outpatient Encounter Prescriptions as of 10/11/2018   Medication Sig Dispense Refill   • DILTIAZem (CARDIZEM) 60 MG Tab Take 2 Tabs by mouth every day. 60 Tab 6   • apixaban (ELIQUIS) 5mg Tab Take 1 Tab by mouth 2 Times a Day. 60 Tab 0   • flecainide (TAMBOCOR) 50 MG tablet TAKE 1 TABLET BY MOUTH TWICE DAILY 60 Tab 6   • furosemide (LASIX) 40 MG Tab Take 1 Tab by mouth every day. 30 Tab 3   • potassium chloride SA (KDUR) 20 MEQ Tab CR Take 1 Tab by mouth every day. 30 Tab 3   • atorvastatin (LIPITOR) 20 MG Tab Take 1 Tab by mouth every day. 30 Tab 3   • lansoprazole (PREVACID) 30 MG CAPSULE DELAYED RELEASE Take 30 mg by mouth as needed.     • [DISCONTINUED] DILTIAZem (CARDIZEM) 60 MG Tab Take 1 Tab by mouth 2 Times a Day. (Patient taking differently: Take 120 mg by mouth every day.) 60 Tab 11     No facility-administered encounter medications on file as of 10/11/2018.      Review of Systems   Constitutional: Negative for chills, fever and malaise/fatigue.   HENT: Negative for congestion.    Respiratory: Negative for cough and shortness of breath.         With exertion.   Cardiovascular: Positive for leg swelling. Negative for chest pain, palpitations, orthopnea and PND.        Has improved with Lasix/KCl   Gastrointestinal: Negative for abdominal pain and nausea.   Musculoskeletal: Negative for myalgias.   Skin: Negative for rash.   Neurological: Negative for  "dizziness, loss of consciousness and headaches.   Endo/Heme/Allergies: Does not bruise/bleed easily.        Objective:   /72 (BP Location: Left arm, Patient Position: Sitting, BP Cuff Size: Adult)   Pulse 82   Ht 1.727 m (5' 8\")   Wt 108.4 kg (239 lb)   SpO2 94%   BMI 36.34 kg/m²     Physical Exam   Constitutional: He is oriented to person, place, and time. He appears well-developed and well-nourished.   He is obese (BMI 36.49)   HENT:   Head: Normocephalic.   Eyes: EOM are normal.   Neck: Normal range of motion. Neck supple. No JVD present.   Cardiovascular: Normal rate, regular rhythm and normal heart sounds.    Pulmonary/Chest: Effort normal and breath sounds normal. No respiratory distress. He has no wheezes. He has no rales.   Abdominal: Soft. Bowel sounds are normal. He exhibits no distension. There is no tenderness.   Musculoskeletal: Normal range of motion. He exhibits edema.   1-2+ edema to just below the knees bilaterally, improved from last visit, R>L   Neurological: He is alert and oriented to person, place, and time.   Skin: Skin is warm and dry. No rash noted.   Psychiatric: He has a normal mood and affect.     He did not get labwork done for today's visit.     Assessment:     1. Localized edema     2. Paroxysmal atrial fibrillation (HCC)  DILTIAZem (CARDIZEM) 60 MG Tab   3. Chronic anticoagulation     4. Essential hypertension     5. Mixed hyperlipidemia  COMP METABOLIC PANEL    LIPID PROFILE       Medical Decision Making:  Today's Assessment / Status / Plan:     1. Edema, improved with addition of Lasix and potassium. Continue current dose. Urged to get CMP done next week.    2. Paroxysmal atrial fibrillation, in sinus rhythm on Flecainide and Cardizem.    3. Chronic anticoagulation with Eliquis, tolerating without any bleeding problems.    4. Hypertension, treated and stable. BP is good today.    5. Hyperlipidemia, now back on Lipitor. To get fasting CMP and lipid panel.    Same " medications for now. Labs as above next week. FU in 4-6 months. FU sooner if clinical condition changes.    Collaborating MD: Kimberli

## 2018-10-11 NOTE — TELEPHONE ENCOUNTER
Rx submitted. Pt notified and advised to call [facility] prior to picking up to make sure samples are available. Pt verbalized understanding.

## 2018-10-11 NOTE — TELEPHONE ENCOUNTER
----- Message from Lucille Calabrese sent at 10/11/2018  2:49 PM PDT -----  Regarding: Patient needs samples of Eliquis  AMIRA/Ilda    Patient needs to get samples of Eliquis called into the [facility]. He wants a confirmation call back and can be reached at [phone].

## 2018-11-12 ENCOUNTER — TELEPHONE (OUTPATIENT)
Dept: CARDIOLOGY | Facility: MEDICAL CENTER | Age: 63
End: 2018-11-12

## 2018-11-12 DIAGNOSIS — I48.0 PAROXYSMAL ATRIAL FIBRILLATION (HCC): ICD-10-CM

## 2018-11-12 NOTE — TELEPHONE ENCOUNTER
Risa Sanchez R.N.             AMIRA/Ilda       Patient needs samples of Eliquis. He can be reached at [phone].      Spoke w/pt and advised rx sent to [facility].

## 2018-11-30 DIAGNOSIS — E78.2 MIXED HYPERLIPIDEMIA: ICD-10-CM

## 2018-11-30 DIAGNOSIS — I10 ESSENTIAL HYPERTENSION: ICD-10-CM

## 2018-12-10 DIAGNOSIS — I10 ESSENTIAL HYPERTENSION: ICD-10-CM

## 2018-12-10 DIAGNOSIS — E78.2 MIXED HYPERLIPIDEMIA: ICD-10-CM

## 2018-12-13 ENCOUNTER — TELEPHONE (OUTPATIENT)
Dept: CARDIOLOGY | Facility: MEDICAL CENTER | Age: 63
End: 2018-12-13

## 2018-12-13 DIAGNOSIS — I48.0 PAROXYSMAL ATRIAL FIBRILLATION (HCC): ICD-10-CM

## 2018-12-27 ENCOUNTER — TELEPHONE (OUTPATIENT)
Dept: CARDIOLOGY | Facility: MEDICAL CENTER | Age: 63
End: 2018-12-27

## 2018-12-27 NOTE — TELEPHONE ENCOUNTER
"----- Message from Mark Garcia, Med Ass't sent at 12/27/2018  3:05 PM PST -----    AB/SS pt calling as he would like to request surgical clearance for umbilical hernia repair. It is not yet scheduled and it sounded like \"Dr Khalil\".         "

## 2018-12-28 NOTE — TELEPHONE ENCOUNTER
I called him.  The surgeon is Dr. Anderson in [place].  Please advise if you can clear him.  Thank you!

## 2019-01-03 DIAGNOSIS — I48.0 PAROXYSMAL ATRIAL FIBRILLATION (HCC): ICD-10-CM

## 2019-01-14 ENCOUNTER — TELEPHONE (OUTPATIENT)
Dept: CARDIOLOGY | Facility: MEDICAL CENTER | Age: 64
End: 2019-01-14

## 2019-01-14 DIAGNOSIS — I48.0 PAROXYSMAL ATRIAL FIBRILLATION (HCC): ICD-10-CM

## 2019-01-14 RX ORDER — DIGOXIN 125 MCG
125 TABLET ORAL DAILY
COMMUNITY
Start: 2019-01-14 | End: 2019-02-21

## 2019-01-14 NOTE — TELEPHONE ENCOUNTER
Pt notified of echo results & OK for hernia surgery w/ Dr. Anderson in [place].   Pt. informs me he went to [facility] ER on Sat. 1/12 for AF. Was given IV meds & converted. Digoxin 125 mcg was added to regimen by ER MD. Advised to F/U with cardiology in next few weeks. FV made w/ MIGUEL ÁNGEL Ayala for first available in [place] on 2/25 (also placed on wait list).  MAR updated. To MIGUEL ÁNGEL Ayala to advise if still OK to fax clearance letter.          EC-ECHOCARDIOGRAM COMPLETE W/O CONT   Order: [id number]   Status:  Final result   Visible to patient:  Yes (MyChart) Dx:  Paroxysmal atrial fibrillation (HCC)   Notes recorded by SAMARIA Ayala on 1/14/2019 at 12:40 PM PST  OK to proceed with surgery. Will you you letter/clearance.  AB  ------    Notes recorded by Tamra Diaz R.N. on 1/14/2019 at 12:05 PM PST  see 12/27 phone note. Done for cardiac clearance.    Next FV 4/11/19 w/ MIGUEL ÁNGEL Ayala

## 2019-01-15 ENCOUNTER — TELEPHONE (OUTPATIENT)
Dept: CARDIOLOGY | Facility: MEDICAL CENTER | Age: 64
End: 2019-01-15

## 2019-01-15 NOTE — TELEPHONE ENCOUNTER
Lucille Sanchez RMaggiN.             AMIRA/Ilda     Patient wants a call back about his Eliquis and can be reached at [phone].      Spoke w/pt >15 min. Answered questions, provided education. Pt verbalized understanding.

## 2019-01-18 NOTE — TELEPHONE ENCOUNTER
Per Hansa:    Patient should hold his anticoagulation as discussed.    He should take his atrial fib medications the morning of the surgery.  If he goes into atrial fibrillation, they may not do the surgery, but that is up to the surgeon and anesthesiologist.

## 2019-02-13 ENCOUNTER — TELEPHONE (OUTPATIENT)
Dept: CARDIOLOGY | Facility: MEDICAL CENTER | Age: 64
End: 2019-02-13

## 2019-02-13 DIAGNOSIS — I48.0 PAROXYSMAL ATRIAL FIBRILLATION (HCC): ICD-10-CM

## 2019-02-13 NOTE — TELEPHONE ENCOUNTER
eliquis samples   Received: Today   Message Contents   Ana Sanchez R.N.   Phone Number: [phone]                 Ilda/     Pt calling to further discuss getting samples alan.  Please call Venkata at [phone], he states you've been helping him with this.      Sample rx sent to [facility]. Pt notified.

## 2019-02-21 ENCOUNTER — OFFICE VISIT (OUTPATIENT)
Dept: CARDIOLOGY | Facility: PHYSICIAN GROUP | Age: 64
End: 2019-02-21
Payer: MEDICARE

## 2019-02-21 VITALS
OXYGEN SATURATION: 93 % | DIASTOLIC BLOOD PRESSURE: 80 MMHG | HEART RATE: 80 BPM | WEIGHT: 240 LBS | BODY MASS INDEX: 36.37 KG/M2 | SYSTOLIC BLOOD PRESSURE: 130 MMHG | HEIGHT: 68 IN

## 2019-02-21 DIAGNOSIS — E78.2 MIXED HYPERLIPIDEMIA: ICD-10-CM

## 2019-02-21 DIAGNOSIS — I10 ESSENTIAL HYPERTENSION: ICD-10-CM

## 2019-02-21 DIAGNOSIS — Z79.01 CHRONIC ANTICOAGULATION: ICD-10-CM

## 2019-02-21 DIAGNOSIS — I48.0 PAROXYSMAL ATRIAL FIBRILLATION (HCC): ICD-10-CM

## 2019-02-21 DIAGNOSIS — R60.0 LOCALIZED EDEMA: ICD-10-CM

## 2019-02-21 PROCEDURE — 99214 OFFICE O/P EST MOD 30 MIN: CPT | Performed by: NURSE PRACTITIONER

## 2019-02-21 RX ORDER — FUROSEMIDE 20 MG/1
20 TABLET ORAL
Refills: 3 | COMMUNITY
Start: 2019-01-29 | End: 2019-04-11 | Stop reason: SDUPTHER

## 2019-02-21 RX ORDER — SPIRONOLACTONE 25 MG/1
25 TABLET ORAL
Refills: 3 | COMMUNITY
Start: 2019-01-29 | End: 2019-04-11

## 2019-02-21 RX ORDER — FUROSEMIDE 40 MG/1
40 TABLET ORAL DAILY
Qty: 90 TAB | Refills: 3 | Status: SHIPPED | OUTPATIENT
Start: 2019-02-21 | End: 2019-04-11

## 2019-02-21 RX ORDER — ATORVASTATIN CALCIUM 20 MG/1
20 TABLET, FILM COATED ORAL DAILY
Qty: 90 TAB | Refills: 3 | Status: SHIPPED | OUTPATIENT
Start: 2019-02-21 | End: 2019-04-11 | Stop reason: SDUPTHER

## 2019-02-21 ASSESSMENT — ENCOUNTER SYMPTOMS
FEVER: 0
MYALGIAS: 0
PND: 0
DIZZINESS: 0
PALPITATIONS: 0
ORTHOPNEA: 0
LOSS OF CONSCIOUSNESS: 0
ABDOMINAL PAIN: 0
HEADACHES: 0
INSOMNIA: 0
COUGH: 0
CHILLS: 0
SHORTNESS OF BREATH: 0
NAUSEA: 0
BRUISES/BLEEDS EASILY: 0

## 2019-02-21 NOTE — LETTER
[facility] [address]  Phone: [phone]  Fax: [phone]              Venkata David  1955    Encounter Date: 2/21/2019    SAMARIA Ayala          PROGRESS NOTE:  No notes on file      No Recipients

## 2019-02-21 NOTE — PROGRESS NOTES
Chief Complaint   Patient presents with   • Follow-Up   • Atrial Fibrillation   • Anticoagulation   • Edema   • HTN (Controlled)   • Hyperlipidemia       Subjective:   Venkata David is a 63 y.o. male who presents today for three month follow-up of AFib, anticoagulation, chronic LE edema, HTN and hyperlipidemia.    Venkata is a 63 year old male with history of paroxysmal atrial fibrillation on Flecainide and Cardizem, anticoagulation with Eliquis, hypertension, hyperlipidemia, and chronic LE edema, normally followed by Dr. Garcia.    In January 2019, after being cleared for hernia surgery, he presented to the ER at [facility] with AFib with RVR. Digoxin was added to his medical regimen, but never started. Lasix was increased from 40mg to 60mg, and Aldactone 25mg was added; KCl was not stopped.    He is here today for follow-up. LE edema is better, but still present. Generally, he is stable: no further symptomatic palpitations. No chest pain, pressure or discomfort; some mild orthopnea, but no PND. Ongoing lightheadedness, but no syncope or presyncope.  He has postponed hernia repair indefinitely for now.    Past Medical History:   Diagnosis Date   • Bowel habit changes     Constipation   • CHF (congestive heart failure) ([facility]) 12/2017    Echocardiogram with rEF of 45%. January 2019: Echocardiogram with LVEF 60%.   • Chronic anticoagulation    • Disorder of thyroid     As a child only   • Hepatitis C 1980   • Hyperglycemia    • Hyperlipidemia    • Hypertension    • Indigestion    • Obesity    • Paroxysmal atrial fibrillation (HCC) 01/2019     Echocardiogram with normal LV size, LVEF 60%. Grade I diastolic dysfunction. Mildly dilated LA. Mild MR, mild AR. Moderate TR.   • Psychiatric problem    • Renal disorder     KIDNEY FAILURE EARLY PT.   • Urinary bladder disorder     ENLARGED PROSTATE     Past Surgical History:   Procedure Laterality Date   • TRANS URETHRAL RESECTION PROSTATE  12/8/2017    Procedure:  TRANS URETHRAL RESECTION PROSTATE;  Surgeon: Jonny Chávze M.D.;  Location: SURGERY [facility];  Service: Urology   • INGUINAL HERNIA REPAIR CHILD  as child     History reviewed. No pertinent family history.  Social History     Social History   • Marital status:      Spouse name: N/A   • Number of children: N/A   • Years of education: N/A     Occupational History   • Not on file.     Social History Main Topics   • Smoking status: Former Smoker     Packs/day: 0.25     Quit date: 6/6/2018   • Smokeless tobacco: Never Used   • Alcohol use No   • Drug use: No   • Sexual activity: Not on file     Other Topics Concern   • Not on file     Social History Narrative   • No narrative on file     No Known Allergies  Outpatient Encounter Prescriptions as of 2/21/2019   Medication Sig Dispense Refill   • spironolactone (ALDACTONE) 25 MG Tab Take 25 mg by mouth every day.  3   • furosemide (LASIX) 20 MG Tab Take 20 mg by mouth every day.  3   • furosemide (LASIX) 40 MG Tab Take 1 Tab by mouth every day. 90 Tab 3   • atorvastatin (LIPITOR) 20 MG Tab Take 1 Tab by mouth every day. 90 Tab 3   • apixaban (ELIQUIS) 5mg Tab Take 1 Tab by mouth 2 Times a Day. 60 Tab 0   • DILTIAZem (CARDIZEM) 60 MG Tab Take 2 Tabs by mouth every day. 60 Tab 6   • flecainide (TAMBOCOR) 50 MG tablet TAKE 1 TABLET BY MOUTH TWICE DAILY 60 Tab 6   • lansoprazole (PREVACID) 30 MG CAPSULE DELAYED RELEASE Take 30 mg by mouth as needed.     • [DISCONTINUED] digoxin (LANOXIN) 125 MCG Tab Take 1 Tab by mouth every day.     • [DISCONTINUED] furosemide (LASIX) 40 MG Tab Take 1 Tab by mouth every day. 30 Tab 3   • [DISCONTINUED] potassium chloride SA (KDUR) 20 MEQ Tab CR Take 1 Tab by mouth every day. 30 Tab 3   • [DISCONTINUED] atorvastatin (LIPITOR) 20 MG Tab Take 1 Tab by mouth every day. 30 Tab 3     No facility-administered encounter medications on file as of 2/21/2019.      Review of Systems   Constitutional: Negative for chills, fever and  "malaise/fatigue.   HENT: Negative for congestion.    Respiratory: Negative for cough and shortness of breath.         With exertion.   Cardiovascular: Positive for leg swelling. Negative for chest pain, palpitations, orthopnea and PND.        Has improved with Lasix/Aldactone.   Gastrointestinal: Negative for abdominal pain and nausea.   Musculoskeletal: Negative for myalgias.   Skin: Negative for rash.   Neurological: Negative for dizziness, loss of consciousness and headaches.   Endo/Heme/Allergies: Does not bruise/bleed easily.   Psychiatric/Behavioral: The patient does not have insomnia.         Objective:   /80 (BP Location: Left arm, Patient Position: Sitting, BP Cuff Size: Adult)   Pulse 80   Ht 1.727 m (5' 8\")   Wt 108.9 kg (240 lb)   SpO2 93%   BMI 36.49 kg/m²     Physical Exam   Constitutional: He is oriented to person, place, and time. He appears well-developed and well-nourished.   He is obese (BMI 36.49)   HENT:   Head: Normocephalic.   Eyes: EOM are normal.   Neck: Normal range of motion. Neck supple. No JVD present.   Cardiovascular: Normal rate, regular rhythm and normal heart sounds.    Pulmonary/Chest: Effort normal and breath sounds normal. No respiratory distress. He has no wheezes. He has no rales.   Abdominal: Soft. Bowel sounds are normal. He exhibits no distension. There is no tenderness.   Musculoskeletal: Normal range of motion. He exhibits edema.   1-2+ edema to just below the knees bilaterally, with some mild weeping of the skins.   Neurological: He is alert and oriented to person, place, and time.   Skin: Skin is warm and dry. No rash noted.   Psychiatric: He has a normal mood and affect.     ECHOCARDIOGRAM OF 1/7/2019:  Normal LV size  LVEF 60%  Grade I diastolic dysfunction  Mildly dilated LA  Mild AR  Moderate TR    LABS AS OF 1/12/2019:  CBC stable/normal  Glucose 126  BUN 21  Creatinine 1.29  GFR 58  Potassium 4.0  AST 16  ALT 26  BNP 80  Troponin 0.02    Assessment: "     1. Paroxysmal atrial fibrillation (HCC)     2. Chronic anticoagulation     3. Essential hypertension     4. Mixed hyperlipidemia  atorvastatin (LIPITOR) 20 MG Tab   5. Localized edema  furosemide (LASIX) 40 MG Tab       Medical Decision Making:  Today's Assessment / Status / Plan:     1. Paroxysmal atrial fibrillation, in sinus rhythm today on Flecainide and Diltiazem. No need to Digoxin right now.    2. Chronic anticoagulation with Eliquis. No bleeding issues.    3. Hypertension, treated and stable. BP is good today.    4. Hyperlipidemia, treated with Lipitor. This is renewed today.    5. LE edema, slowly improving. Continue Lasix 60mg (40 + 20) once daily, along with Aldactone 25mg once daily. STOP KCl. Check BMP this week.    Plan as above. Labs this week. FU with me in 4-6 weeks to edema recheck. FU sooner if clinical condition changes.    Collaborating MD: ARI Mendiola

## 2019-03-19 ENCOUNTER — TELEPHONE (OUTPATIENT)
Dept: CARDIOLOGY | Facility: MEDICAL CENTER | Age: 64
End: 2019-03-19

## 2019-03-19 DIAGNOSIS — I48.0 PAROXYSMAL ATRIAL FIBRILLATION (HCC): ICD-10-CM

## 2019-03-19 NOTE — TELEPHONE ENCOUNTER
Eliquis samples   Received: Today   Message Contents   Peggymauri Castro, Med Ass't  Ilda Sanchez, RMaggiN.   Phone Number: [phone]             Pt wants to talk to you again to get more samples of eliquis.   Ph#: [phone]     Sample rx sent to [facility]. Pt notified.

## 2019-04-11 ENCOUNTER — OFFICE VISIT (OUTPATIENT)
Dept: CARDIOLOGY | Facility: PHYSICIAN GROUP | Age: 64
End: 2019-04-11
Payer: MEDICARE

## 2019-04-11 VITALS
SYSTOLIC BLOOD PRESSURE: 128 MMHG | DIASTOLIC BLOOD PRESSURE: 78 MMHG | WEIGHT: 238 LBS | OXYGEN SATURATION: 94 % | HEIGHT: 68 IN | HEART RATE: 88 BPM | BODY MASS INDEX: 36.07 KG/M2

## 2019-04-11 DIAGNOSIS — I48.0 PAROXYSMAL ATRIAL FIBRILLATION (HCC): ICD-10-CM

## 2019-04-11 DIAGNOSIS — E78.2 MIXED HYPERLIPIDEMIA: ICD-10-CM

## 2019-04-11 DIAGNOSIS — I10 ESSENTIAL HYPERTENSION: ICD-10-CM

## 2019-04-11 DIAGNOSIS — R60.0 LOCALIZED EDEMA: ICD-10-CM

## 2019-04-11 DIAGNOSIS — Z79.01 CHRONIC ANTICOAGULATION: ICD-10-CM

## 2019-04-11 PROCEDURE — 99214 OFFICE O/P EST MOD 30 MIN: CPT | Performed by: NURSE PRACTITIONER

## 2019-04-11 RX ORDER — SPIRONOLACTONE 50 MG/1
TABLET, FILM COATED ORAL
Refills: 0 | COMMUNITY
Start: 2019-04-05 | End: 2019-04-11 | Stop reason: SDUPTHER

## 2019-04-11 RX ORDER — ATORVASTATIN CALCIUM 20 MG/1
20 TABLET, FILM COATED ORAL DAILY
Qty: 90 TAB | Refills: 3
Start: 2019-04-11 | End: 2020-03-18 | Stop reason: SDUPTHER

## 2019-04-11 RX ORDER — FUROSEMIDE 20 MG/1
60 TABLET ORAL
Qty: 90 TAB | Refills: 6 | Status: SHIPPED | OUTPATIENT
Start: 2019-04-11 | End: 2020-08-13

## 2019-04-11 RX ORDER — AMOXICILLIN AND CLAVULANATE POTASSIUM 875; 125 MG/1; MG/1
TABLET, FILM COATED ORAL
Refills: 0 | COMMUNITY
Start: 2019-04-05 | End: 2019-07-18

## 2019-04-11 RX ORDER — SPIRONOLACTONE 50 MG/1
50 TABLET, FILM COATED ORAL DAILY
Qty: 30 TAB | Refills: 6 | Status: SHIPPED | OUTPATIENT
Start: 2019-04-11 | End: 2020-04-22 | Stop reason: SDUPTHER

## 2019-04-11 ASSESSMENT — ENCOUNTER SYMPTOMS
MYALGIAS: 0
COUGH: 0
SHORTNESS OF BREATH: 0
PALPITATIONS: 0
PND: 0
NAUSEA: 0
ORTHOPNEA: 0
HEADACHES: 0
CHILLS: 0
LOSS OF CONSCIOUSNESS: 0
DIZZINESS: 0
ABDOMINAL PAIN: 0
FEVER: 0
INSOMNIA: 0
BRUISES/BLEEDS EASILY: 0

## 2019-04-11 NOTE — PROGRESS NOTES
Chief Complaint   Patient presents with   • Follow-Up   • Edema   • Atrial Fibrillation   • Anticoagulation   • HTN (Controlled)   • Hyperlipidemia       Subjective:   Venkata David is a 63 y.o. male who presents today for three week follow-up of LE Edema.    Venkata is a 63 year old male with history of paroxysmal atrial fibrillation on Flecainide and Cardizem, anticoagulation with Eliquis, hypertension, hyperlipidemia, and chronic LE edema, normally followed by Dr. Garcia.    At follow-up with me three weeks ago, he was having worsening LE edema.  I increased his Lasix, but edema didn't improve. He ended up going to [facility], and was treated for LE cellulitis with Augmentin. Aldactone was also increased to 50mg, along with Lasix 60mg.      He is here today for follow-up. LE edema is better, but still present. The redness seems to be getting slowly better. Generally, he is stable: no further symptomatic palpitations. No chest pain, pressure or discomfort; some mild orthopnea, but no PND. Ongoing lightheadedness, but no syncope or presyncope. His surgeon has said he is not a candidate for hernia repair.     Past Medical History:   Diagnosis Date   • Bowel habit changes     Constipation   • CHF (congestive heart failure) ([facility]) 12/2017    Echocardiogram with rEF of 45%. January 2019: Echocardiogram with LVEF 60%.   • Chronic anticoagulation    • Disorder of thyroid     As a child only   • Hepatitis C 1980   • Hyperglycemia    • Hyperlipidemia    • Hypertension    • Indigestion    • Obesity    • Paroxysmal atrial fibrillation (HCC) 01/2019     Echocardiogram with normal LV size, LVEF 60%. Grade I diastolic dysfunction. Mildly dilated LA. Mild MR, mild AR. Moderate TR.   • Psychiatric problem    • Renal disorder     KIDNEY FAILURE EARLY PT.   • Urinary bladder disorder     ENLARGED PROSTATE     Past Surgical History:   Procedure Laterality Date   • TRANS URETHRAL RESECTION PROSTATE  12/8/2017     Procedure: TRANS URETHRAL RESECTION PROSTATE;  Surgeon: Jonny Chávez M.D.;  Location: SURGERY [facility];  Service: Urology   • INGUINAL HERNIA REPAIR CHILD  as child     History reviewed. No pertinent family history.  Social History     Social History   • Marital status:      Spouse name: N/A   • Number of children: N/A   • Years of education: N/A     Occupational History   • Not on file.     Social History Main Topics   • Smoking status: Former Smoker     Packs/day: 0.25     Quit date: 6/6/2018   • Smokeless tobacco: Never Used   • Alcohol use No   • Drug use: No   • Sexual activity: Not on file     Other Topics Concern   • Not on file     Social History Narrative   • No narrative on file     No Known Allergies  Outpatient Encounter Prescriptions as of 4/11/2019   Medication Sig Dispense Refill   • amoxicillin-clavulanate (AUGMENTIN) 875-125 MG Tab   0   • atorvastatin (LIPITOR) 20 MG Tab Take 1 Tab by mouth every day. 90 Tab 3   • furosemide (LASIX) 20 MG Tab Take 3 Tabs by mouth every day. 90 Tab 6   • spironolactone (ALDACTONE) 50 MG Tab Take 1 Tab by mouth every day. 30 Tab 6   • apixaban (ELIQUIS) 5mg Tab Take 1 Tab by mouth 2 Times a Day. 60 Tab 0   • DILTIAZem (CARDIZEM) 60 MG Tab Take 2 Tabs by mouth every day. 60 Tab 6   • flecainide (TAMBOCOR) 50 MG tablet TAKE 1 TABLET BY MOUTH TWICE DAILY 60 Tab 6   • [DISCONTINUED] spironolactone (ALDACTONE) 50 MG Tab   0   • [DISCONTINUED] spironolactone (ALDACTONE) 25 MG Tab Take 25 mg by mouth every day.  3   • [DISCONTINUED] furosemide (LASIX) 20 MG Tab Take 20 mg by mouth every day.  3   • [DISCONTINUED] furosemide (LASIX) 40 MG Tab Take 1 Tab by mouth every day. 90 Tab 3   • [DISCONTINUED] atorvastatin (LIPITOR) 20 MG Tab Take 1 Tab by mouth every day. (Patient not taking: Reported on 4/11/2019) 90 Tab 3   • lansoprazole (PREVACID) 30 MG CAPSULE DELAYED RELEASE Take 30 mg by mouth as needed.       No facility-administered encounter medications on  "file as of 4/11/2019.      Review of Systems   Constitutional: Negative for chills, fever and malaise/fatigue.   HENT: Negative for congestion.    Respiratory: Negative for cough and shortness of breath.         With exertion.   Cardiovascular: Positive for leg swelling. Negative for chest pain, palpitations, orthopnea and PND.        Has improved with Lasix/Aldactone.   Gastrointestinal: Negative for abdominal pain and nausea.   Musculoskeletal: Negative for myalgias.   Skin: Negative for rash.   Neurological: Negative for dizziness, loss of consciousness and headaches.   Endo/Heme/Allergies: Does not bruise/bleed easily.   Psychiatric/Behavioral: The patient does not have insomnia.         Objective:   /78 (BP Location: Left arm, Patient Position: Sitting, BP Cuff Size: Adult)   Pulse 88   Ht 1.727 m (5' 8\")   Wt 108 kg (238 lb)   SpO2 94%   BMI 36.19 kg/m²     Physical Exam   Constitutional: He is oriented to person, place, and time. He appears well-developed and well-nourished.   He is obese (BMI 36.19); weight is down 2 pounds.   HENT:   Head: Normocephalic.   Eyes: EOM are normal.   Neck: Normal range of motion. Neck supple. No JVD present.   Cardiovascular: Normal rate, regular rhythm and normal heart sounds.    Pulmonary/Chest: Effort normal and breath sounds normal. No respiratory distress. He has no wheezes. He has no rales.   Abdominal: Soft. Bowel sounds are normal. He exhibits no distension. There is no tenderness.   Musculoskeletal: Normal range of motion. He exhibits edema.   1-2+ edema to just below the knees bilaterally, with some mild weeping of the skins. Redness that is slowly improving.   Neurological: He is alert and oriented to person, place, and time.   Skin: Skin is warm and dry. No rash noted.   Psychiatric: He has a normal mood and affect.     LABS AS OF 4/8/2019:  Glucose 129  BUN 28  Creatinine 1.40  GFR 52  Potassium 4.2    Assessment:     1. Localized edema  furosemide " (LASIX) 20 MG Tab    spironolactone (ALDACTONE) 50 MG Tab   2. Paroxysmal atrial fibrillation (HCC)     3. Chronic anticoagulation     4. Essential hypertension     5. Mixed hyperlipidemia  atorvastatin (LIPITOR) 20 MG Tab       Medical Decision Making:  Today's Assessment / Status / Plan:     1. LE edema, chronic, with LE cellulitis. I spent 20-30 minutes reviewing his medications. He is to continue Lasix 60mg and Aldactone 50mg. Also finish up Augmentin.    2. Paroxysmal atrial fibrillation, in sinus rhythm on Flecainide and Cartia.    3. Chronic anticoagulation with Eliquis.    4. Hypertension, treated and stable.    5. Hyperlipidemia, treated with Lipitor. Reminded to take Lipitor.    Same medications. Again reviewed medications for 20-30 minutes, and he is given written instructions. FU in 3 months, sooner if clinical condition changes.    Collaborating MD: Isamar

## 2019-04-11 NOTE — LETTER
[facility] [address]  Phone: [phone]  Fax: [phone]              Venkata David  1955    Encounter Date: 4/11/2019    SAMARIA Ayala          PROGRESS NOTE:  Chief Complaint   Patient presents with   • Follow-Up   • Edema   • Atrial Fibrillation   • Anticoagulation   • HTN (Controlled)   • Hyperlipidemia       Subjective:   Venkata David is a 63 y.o. male who presents today for three week follow-up of LE Edema.    Venkata is a 63 year old male with history of paroxysmal atrial fibrillation on Flecainide and Cardizem, anticoagulation with Eliquis, hypertension, hyperlipidemia, and chronic LE edema, normally followed by Dr. Garcia.    At follow-up with me three weeks ago, he was having worsening LE edema.  I increased his Lasix, but edema didn't improve. He ended up going to [facility], and was treated for LE cellulitis with Augmentin. Aldactone was also increased to 50mg, along with Lasix 60mg.      He is here today for follow-up. LE edema is better, but still present. The redness seems to be getting slowly better. Generally, he is stable: no further symptomatic palpitations. No chest pain, pressure or discomfort; some mild orthopnea, but no PND. Ongoing lightheadedness, but no syncope or presyncope. His surgeon has said he is not a candidate for hernia repair.     Past Medical History:   Diagnosis Date   • Bowel habit changes     Constipation   • CHF (congestive heart failure) (HCC) 12/2017    Echocardiogram with rEF of 45%. January 2019: Echocardiogram with LVEF 60%.   • Chronic anticoagulation    • Disorder of thyroid     As a child only   • Hepatitis C 1980   • Hyperglycemia    • Hyperlipidemia    • Hypertension    • Indigestion    • Obesity    • Paroxysmal atrial fibrillation (HCC) 01/2019     Echocardiogram with normal LV size, LVEF 60%. Grade I diastolic dysfunction. Mildly dilated LA.  Mild MR, mild AR. Moderate TR.   • Psychiatric problem    • Renal disorder     KIDNEY FAILURE EARLY PT.   • Urinary bladder disorder     ENLARGED PROSTATE     Past Surgical History:   Procedure Laterality Date   • TRANS URETHRAL RESECTION PROSTATE  12/8/2017    Procedure: TRANS URETHRAL RESECTION PROSTATE;  Surgeon: Jonny Chávez M.D.;  Location: SURGERY [facility];  Service: Urology   • INGUINAL HERNIA REPAIR CHILD  as child     History reviewed. No pertinent family history.  Social History     Social History   • Marital status:      Spouse name: N/A   • Number of children: N/A   • Years of education: N/A     Occupational History   • Not on file.     Social History Main Topics   • Smoking status: Former Smoker     Packs/day: 0.25     Quit date: 6/6/2018   • Smokeless tobacco: Never Used   • Alcohol use No   • Drug use: No   • Sexual activity: Not on file     Other Topics Concern   • Not on file     Social History Narrative   • No narrative on file     No Known Allergies  Outpatient Encounter Prescriptions as of 4/11/2019   Medication Sig Dispense Refill   • amoxicillin-clavulanate (AUGMENTIN) 875-125 MG Tab   0   • atorvastatin (LIPITOR) 20 MG Tab Take 1 Tab by mouth every day. 90 Tab 3   • furosemide (LASIX) 20 MG Tab Take 3 Tabs by mouth every day. 90 Tab 6   • spironolactone (ALDACTONE) 50 MG Tab Take 1 Tab by mouth every day. 30 Tab 6   • apixaban (ELIQUIS) 5mg Tab Take 1 Tab by mouth 2 Times a Day. 60 Tab 0   • DILTIAZem (CARDIZEM) 60 MG Tab Take 2 Tabs by mouth every day. 60 Tab 6   • flecainide (TAMBOCOR) 50 MG tablet TAKE 1 TABLET BY MOUTH TWICE DAILY 60 Tab 6   • [DISCONTINUED] spironolactone (ALDACTONE) 50 MG Tab   0   • [DISCONTINUED] spironolactone (ALDACTONE) 25 MG Tab Take 25 mg by mouth every day.  3   • [DISCONTINUED] furosemide (LASIX) 20 MG Tab Take 20 mg by mouth every day.  3   • [DISCONTINUED] furosemide (LASIX) 40 MG Tab Take 1 Tab by mouth every day. 90 Tab 3   • [DISCONTINUED]  "atorvastatin (LIPITOR) 20 MG Tab Take 1 Tab by mouth every day. (Patient not taking: Reported on 4/11/2019) 90 Tab 3   • lansoprazole (PREVACID) 30 MG CAPSULE DELAYED RELEASE Take 30 mg by mouth as needed.       No facility-administered encounter medications on file as of 4/11/2019.      Review of Systems   Constitutional: Negative for chills, fever and malaise/fatigue.   HENT: Negative for congestion.    Respiratory: Negative for cough and shortness of breath.         With exertion.   Cardiovascular: Positive for leg swelling. Negative for chest pain, palpitations, orthopnea and PND.        Has improved with Lasix/Aldactone.   Gastrointestinal: Negative for abdominal pain and nausea.   Musculoskeletal: Negative for myalgias.   Skin: Negative for rash.   Neurological: Negative for dizziness, loss of consciousness and headaches.   Endo/Heme/Allergies: Does not bruise/bleed easily.   Psychiatric/Behavioral: The patient does not have insomnia.         Objective:   /78 (BP Location: Left arm, Patient Position: Sitting, BP Cuff Size: Adult)   Pulse 88   Ht 1.727 m (5' 8\")   Wt 108 kg (238 lb)   SpO2 94%   BMI 36.19 kg/m²      Physical Exam   Constitutional: He is oriented to person, place, and time. He appears well-developed and well-nourished.   He is obese (BMI 36.19); weight is down 2 pounds.   HENT:   Head: Normocephalic.   Eyes: EOM are normal.   Neck: Normal range of motion. Neck supple. No JVD present.   Cardiovascular: Normal rate, regular rhythm and normal heart sounds.    Pulmonary/Chest: Effort normal and breath sounds normal. No respiratory distress. He has no wheezes. He has no rales.   Abdominal: Soft. Bowel sounds are normal. He exhibits no distension. There is no tenderness.   Musculoskeletal: Normal range of motion. He exhibits edema.   1-2+ edema to just below the knees bilaterally, with some mild weeping of the skins. Redness that is slowly improving.   Neurological: He is alert and oriented " to person, place, and time.   Skin: Skin is warm and dry. No rash noted.   Psychiatric: He has a normal mood and affect.     LABS AS OF 4/8/2019:  Glucose 129  BUN 28  Creatinine 1.40  GFR 52  Potassium 4.2    Assessment:     1. Localized edema  furosemide (LASIX) 20 MG Tab    spironolactone (ALDACTONE) 50 MG Tab   2. Paroxysmal atrial fibrillation (HCC)     3. Chronic anticoagulation     4. Essential hypertension     5. Mixed hyperlipidemia  atorvastatin (LIPITOR) 20 MG Tab       Medical Decision Making:  Today's Assessment / Status / Plan:     1. LE edema, chronic, with LE cellulitis. I spent 20-30 minutes reviewing his medications. He is to continue Lasix 60mg and Aldactone 50mg. Also finish up Augmentin.    2. Paroxysmal atrial fibrillation, in sinus rhythm on Flecainide and Cartia.    3. Chronic anticoagulation with Eliquis.    4. Hypertension, treated and stable.    5. Hyperlipidemia, treated with Lipitor. Reminded to take Lipitor.    Same medications. Again reviewed medications for 20-30 minutes, and he is given written instructions. FU in 3 months, sooner if clinical condition changes.    Collaborating MD: Isamar Chaudhary

## 2019-04-16 DIAGNOSIS — I48.0 PAF (PAROXYSMAL ATRIAL FIBRILLATION) (HCC): ICD-10-CM

## 2019-04-17 ENCOUNTER — TELEPHONE (OUTPATIENT)
Dept: CARDIOLOGY | Facility: MEDICAL CENTER | Age: 64
End: 2019-04-17

## 2019-04-17 RX ORDER — FLECAINIDE ACETATE 50 MG/1
50 TABLET ORAL 2 TIMES DAILY
Qty: 180 TAB | Refills: 3 | Status: SHIPPED | OUTPATIENT
Start: 2019-04-17 | End: 2020-03-24

## 2019-04-17 NOTE — TELEPHONE ENCOUNTER
Risa Sanchez R.N.             AMIRA/Ilda     Patient needs more samples of Eliquis. He also has a few questions about his other medications. He can be reached at [phone].      Spoke to pt. Advised [facility] is out of Eliquis samples. Provided pt w/their phone # to call to see if/when they will receive any more. Advised pt he will need to decide on a long-term solution if he cannot afford Eliquis. Provided Pt Assistance information for [employer] ([phone]). Also educated about other options such as Xarelto (my have same problem) and warfarin. Educated and cautioned about risk of stroke w/Afib and purpose of anticoagulation. Pt states he will call [employer] and call us back w/decision.

## 2019-05-08 ENCOUNTER — TELEPHONE (OUTPATIENT)
Dept: CARDIOLOGY | Facility: MEDICAL CENTER | Age: 64
End: 2019-05-08

## 2019-05-08 NOTE — TELEPHONE ENCOUNTER
Peggy Castro, Med Ass't  Ilda Sanchez, RMaggiN.   Phone Number: [phone]             Pt wanted to f/u with you about patient assistance.   # [phone]      Spoke to pt. He will drop patient assistance form off at  office as it is closest for him, for completion of provider section.

## 2019-05-20 ENCOUNTER — TELEPHONE (OUTPATIENT)
Dept: CARDIOLOGY | Facility: MEDICAL CENTER | Age: 64
End: 2019-05-20

## 2019-05-20 DIAGNOSIS — I48.0 PAROXYSMAL ATRIAL FIBRILLATION (HCC): ICD-10-CM

## 2019-05-20 NOTE — TELEPHONE ENCOUNTER
----- Message from Cristal Moreno sent at 5/20/2019  9:12 AM PDT -----  Regarding: following up on patient assistance paper work for Eliquis  Contact: [phone]  Alec Arreola is following up on patient assistance paper work for Eliquis. States he only has one day left and is concerned about running out of medication. He would please like a call back at [phone].

## 2019-05-20 NOTE — TELEPHONE ENCOUNTER
Pt. Dropped off form with Duane in [place] office. Form has been completed; Millicent will give it to  tomorrow to sign.  Called [facility]. They have #14 samples Eliquis 5mg. Sent sample request. Pt. Advised.

## 2019-05-21 DIAGNOSIS — I48.0 PAROXYSMAL ATRIAL FIBRILLATION (HCC): ICD-10-CM

## 2019-05-22 ENCOUNTER — TELEPHONE (OUTPATIENT)
Dept: CARDIOLOGY | Facility: MEDICAL CENTER | Age: 64
End: 2019-05-22

## 2019-05-22 DIAGNOSIS — I48.0 PAROXYSMAL ATRIAL FIBRILLATION (HCC): ICD-10-CM

## 2019-05-22 NOTE — TELEPHONE ENCOUNTER
Cristal Ortega, R.N.   Phone Number: [phone]             AMIRA/Joyce     Pt is calling for update in regards to phone call with Cindy on 5/20 about his patient assistance paperwork. He can be reached at [phone].      Updated pt that paperwork has been signed and faxed. He verbalized understanding and will check in on Friday incase he needs more samples.

## 2019-05-24 NOTE — TELEPHONE ENCOUNTER
Risa Ortega, R.N.             AMIRA/Joyce       Patient is calling to get an update on what's going on with his Eliquis and the paperwork for patient assistance. He can be reached at [phone]      Spoke w/rep from patient assistance who stated they are missing a page from the paperwork. Reprinted and re-faxed. She stated she would expedite the process since he is out and it has been a few days of this. Will call back on Tuesday morning to check in.    Updated pt. He verbalized understanding.

## 2019-05-28 ENCOUNTER — TELEPHONE (OUTPATIENT)
Dept: VASCULAR LAB | Facility: MEDICAL CENTER | Age: 64
End: 2019-05-28

## 2019-05-28 NOTE — TELEPHONE ENCOUNTER
[facility]    Joyce from cardiology states this pt needs Eliquis samples because they are still processing some paperwork for assistance.  Pt will be at the clinic tomorrow to  5 mg samples.  They do not wish to have an anticoagulation referral.    Pavel Bentley, PharmD

## 2019-05-28 NOTE — TELEPHONE ENCOUNTER
RYLIE Kothari,       Patient is asking to speak to you about his Eliquis paperwork. He can be reached at [phone].      Spoke w/rep from assistance program and he stated they have all the paperwork they need to complete the review process. Updated pt that the rep stated it could be another 7-10 days. Sent in 2 weeks worth of samples to the [facility] pharmacy. He verbalized understanding. In the middle of the conversation the line cut out, attempted to call back but was unable to get through.

## 2019-05-28 NOTE — TELEPHONE ENCOUNTER
Pt called back as the [facility] pharmacy is out of samples, called and spoke with pharmacist at coumadin clinic for assistance. He stated he could help and will call the pt. Pt has been updated.

## 2019-05-28 NOTE — TELEPHONE ENCOUNTER
RYLIE Kothari,       Patient called again, said he received a letter in the mail and his application was being processed. He is asking for a call back from you, he wants clarification. He can be reached at [phone].      Call out to pt regarding above. Left voicemail stating that he needed to contact the company regarding the letter and that only know what the rep stated on the phone.

## 2019-05-29 ENCOUNTER — TELEPHONE (OUTPATIENT)
Dept: VASCULAR LAB | Facility: MEDICAL CENTER | Age: 64
End: 2019-05-29

## 2019-05-29 NOTE — TELEPHONE ENCOUNTER
[facility]    Pt picked up Eliquis samples 5 mg #28.  Lot [id number]  Exp 6/21    Pavel Bentley, FrantzD

## 2019-06-18 ENCOUNTER — TELEPHONE (OUTPATIENT)
Dept: CARDIOLOGY | Facility: MEDICAL CENTER | Age: 64
End: 2019-06-18

## 2019-06-18 DIAGNOSIS — I48.0 PAROXYSMAL ATRIAL FIBRILLATION (HCC): ICD-10-CM

## 2019-06-18 RX ORDER — DILTIAZEM HYDROCHLORIDE 60 MG/1
120 TABLET, FILM COATED ORAL DAILY
Qty: 108 TAB | Refills: 2 | Status: SHIPPED | OUTPATIENT
Start: 2019-06-18 | End: 2019-06-19

## 2019-06-18 NOTE — TELEPHONE ENCOUNTER
Pharmacist at [facility] calling re: latest Rx for diltiazem. It is for short acting diltiazem 60 mg., take 2 pills QD.  States pt. used to get the 120 mg long acting, one QD. She's asking if this is an error?   To Hansa Stratton to advise.

## 2019-06-19 RX ORDER — DILTIAZEM HYDROCHLORIDE 120 MG/1
120 CAPSULE, COATED, EXTENDED RELEASE ORAL DAILY
Qty: 90 CAP | Refills: 3 | Status: SHIPPED
Start: 2019-06-19 | End: 2020-01-30

## 2019-06-19 NOTE — TELEPHONE ENCOUNTER
New Rx for Cartia 120mg PO daily sent to [facility], MAR updated     Called pt and notified, pt verbalizes understanding

## 2019-06-19 NOTE — TELEPHONE ENCOUNTER
Peggy Castro, Med Ass't  Tamra Diaz R.N.   Phone Number: [phone]             AB     Pt calling re: diltiazem med. He states there has been some changes that he wasn't aware of. Pt is concerned.   Ph# [phone]      Upon chart review, SC changed his meds last 6/14/18 to Diltiazem 60mg 2 times daily    Called pt, lengthy conversation with pt, pt reports he never switched with Diltiazem 60mg and has been taking Cartia 120mg PO daily all this time, explained to pt that what has been reported on his MAR that is why that is what AB refilled, informed pt will send a corrected prescription for him. Pt verbalizes understanding and requested Rx to be sent to [facility]    To AB ok to changed Rx to Cartia 120mg PO daily? Thanks!

## 2019-07-18 ENCOUNTER — OFFICE VISIT (OUTPATIENT)
Dept: CARDIOLOGY | Facility: PHYSICIAN GROUP | Age: 64
End: 2019-07-18
Payer: MEDICARE

## 2019-07-18 VITALS
WEIGHT: 222 LBS | OXYGEN SATURATION: 93 % | HEART RATE: 84 BPM | BODY MASS INDEX: 33.65 KG/M2 | SYSTOLIC BLOOD PRESSURE: 116 MMHG | HEIGHT: 68 IN | DIASTOLIC BLOOD PRESSURE: 68 MMHG

## 2019-07-18 DIAGNOSIS — I10 ESSENTIAL HYPERTENSION: ICD-10-CM

## 2019-07-18 DIAGNOSIS — E78.2 MIXED HYPERLIPIDEMIA: ICD-10-CM

## 2019-07-18 DIAGNOSIS — I48.0 PAROXYSMAL ATRIAL FIBRILLATION (HCC): ICD-10-CM

## 2019-07-18 DIAGNOSIS — R60.0 LOCALIZED EDEMA: ICD-10-CM

## 2019-07-18 DIAGNOSIS — Z79.01 CHRONIC ANTICOAGULATION: ICD-10-CM

## 2019-07-18 PROCEDURE — 99214 OFFICE O/P EST MOD 30 MIN: CPT | Performed by: NURSE PRACTITIONER

## 2019-07-18 RX ORDER — VANCOMYCIN HYDROCHLORIDE 125 MG/1
CAPSULE ORAL
Refills: 0 | COMMUNITY
Start: 2019-06-26 | End: 2019-07-18

## 2019-07-18 RX ORDER — CLINDAMYCIN HYDROCHLORIDE 300 MG/1
CAPSULE ORAL
Refills: 0 | COMMUNITY
Start: 2019-06-09 | End: 2019-07-18

## 2019-07-18 RX ORDER — SPIRONOLACTONE 25 MG/1
25 TABLET ORAL
Refills: 3 | COMMUNITY
Start: 2019-05-09 | End: 2019-07-18

## 2019-07-18 ASSESSMENT — ENCOUNTER SYMPTOMS
BRUISES/BLEEDS EASILY: 0
SHORTNESS OF BREATH: 0
PND: 0
ABDOMINAL PAIN: 0
DIZZINESS: 0
FEVER: 0
LOSS OF CONSCIOUSNESS: 0
MYALGIAS: 0
INSOMNIA: 0
HEADACHES: 0
NAUSEA: 0
PALPITATIONS: 0
COUGH: 0
CHILLS: 0
ORTHOPNEA: 0

## 2019-07-18 NOTE — LETTER
[facility] [address]  Phone: [phone]  Fax: [phone]              Venkata David  1955    Encounter Date: 7/18/2019    SAMARIA Ayala          PROGRESS NOTE:  Chief Complaint   Patient presents with   • Follow-Up   • Edema   • Atrial Fibrillation   • Anticoagulation   • HTN (Controlled)   • Hyperlipidemia       Subjective:   Venkata David is a 64 y.o. male who presents today for three month follow-up of LE edema, atrial fibrillation and anticoagulation.    Venkata is a 64 year old male with history of paroxysmal atrial fibrillation on Flecainide and Cardizem, anticoagulation with Eliquis, hypertension, hyperlipidemia, and chronic LE edema, normally followed by Dr. Garcia.    At last follow-up, he was having worsening LE edema, complicated by cellulitis. His diuretics were increased.    Since the last visit, he was treated with IV Vancomycin, and cellulitis is much improved. LE edema is also better.   Generally, he is stable and feeling better: no further symptomatic palpitations. No chest pain, pressure or discomfort; some mild orthopnea, but no PND. Ongoing lightheadedness, but no syncope or presyncope. No fever or chills.     Past Medical History:   Diagnosis Date   • Bowel habit changes     Constipation   • CHF (congestive heart failure) (HCC) 12/2017    Echocardiogram with rEF of 45%. January 2019: Echocardiogram with LVEF 60%.   • Chronic anticoagulation    • Disorder of thyroid     As a child only   • Hepatitis C 1980   • Hyperglycemia    • Hyperlipidemia    • Hypertension    • Indigestion    • Obesity    • Paroxysmal atrial fibrillation (HCC) 01/2019     Echocardiogram with normal LV size, LVEF 60%. Grade I diastolic dysfunction. Mildly dilated LA. Mild MR, mild AR. Moderate TR.   • Psychiatric problem    • Renal disorder     KIDNEY FAILURE EARLY PT.   • Urinary bladder disorder     ENLARGED PROSTATE        Past Surgical History:   Procedure Laterality Date   • TRANS URETHRAL RESECTION PROSTATE  12/8/2017    Procedure: TRANS URETHRAL RESECTION PROSTATE;  Surgeon: Jonny Chávez M.D.;  Location: SURGERY [facility];  Service: Urology   • INGUINAL HERNIA REPAIR CHILD  as child     History reviewed. No pertinent family history.  Social History     Social History   • Marital status:      Spouse name: N/A   • Number of children: N/A   • Years of education: N/A     Occupational History   • Not on file.     Social History Main Topics   • Smoking status: Former Smoker     Packs/day: 0.25     Quit date: 6/6/2018   • Smokeless tobacco: Never Used   • Alcohol use No   • Drug use: No   • Sexual activity: Not on file     Other Topics Concern   • Not on file     Social History Narrative   • No narrative on file     No Known Allergies  Outpatient Encounter Prescriptions as of 7/18/2019   Medication Sig Dispense Refill   • DILTIAZem CD (CARDIZEM CD) 120 MG CAPSULE SR 24 HR Take 1 Cap by mouth every day. 90 Cap 3   • apixaban (ELIQUIS) 5mg Tab Take 1 Tab by mouth 2 Times a Day. 28 Tab 0   • flecainide (TAMBOCOR) 50 MG tablet Take 1 Tab by mouth 2 times a day. 180 Tab 3   • atorvastatin (LIPITOR) 20 MG Tab Take 1 Tab by mouth every day. 90 Tab 3   • furosemide (LASIX) 20 MG Tab Take 3 Tabs by mouth every day. 90 Tab 6   • spironolactone (ALDACTONE) 50 MG Tab Take 1 Tab by mouth every day. 30 Tab 6   • [DISCONTINUED] clindamycin (CLEOCIN) 300 MG Cap TAKE 1 CAPSULE BY MOUTH EVERY 8 HOURS INTERVAL FOR 5 DAYS  0   • [DISCONTINUED] spironolactone (ALDACTONE) 25 MG Tab Take 25 mg by mouth every day.  3   • [DISCONTINUED] vancomycin (VANCOCIN) 125 MG capsule 1 CAPSULE EVERY 6 HOURS FOR 7 DAYS  0   • [DISCONTINUED] amoxicillin-clavulanate (AUGMENTIN) 875-125 MG Tab   0   • [DISCONTINUED] lansoprazole (PREVACID) 30 MG CAPSULE DELAYED RELEASE Take 30 mg by mouth as needed.       No facility-administered encounter medications on  "file as of 7/18/2019.      Review of Systems   Constitutional: Negative for chills, fever and malaise/fatigue.   HENT: Negative for congestion.    Respiratory: Negative for cough and shortness of breath.    Cardiovascular: Positive for leg swelling. Negative for chest pain, palpitations, orthopnea and PND.        Much better with Lasix/Aldactone, and since cellulitis is now resolved.    Gastrointestinal: Negative for abdominal pain and nausea.   Musculoskeletal: Negative for myalgias.   Skin: Negative for rash.   Neurological: Negative for dizziness, loss of consciousness and headaches.   Endo/Heme/Allergies: Does not bruise/bleed easily.   Psychiatric/Behavioral: The patient does not have insomnia.         Objective:   /68 (BP Location: Left arm, Patient Position: Sitting, BP Cuff Size: Adult)   Pulse 84   Ht 1.727 m (5' 8\")   Wt 100.7 kg (222 lb)   SpO2 93%   BMI 33.75 kg/m²      Physical Exam   Constitutional: He is oriented to person, place, and time. He appears well-developed and well-nourished.   Weight is down 16 pounds.   HENT:   Head: Normocephalic.   Eyes: EOM are normal.   Neck: Normal range of motion. Neck supple. No JVD present.   Cardiovascular: Normal rate, regular rhythm and normal heart sounds.    Pulmonary/Chest: Effort normal and breath sounds normal. No respiratory distress. He has no wheezes. He has no rales.   Abdominal: Soft. Bowel sounds are normal. He exhibits no distension. There is no tenderness.   Musculoskeletal: Normal range of motion. He exhibits edema.   1+ LE edema, R>L, much improved since last visit.  Erythema is also resolved.   Neurological: He is alert and oriented to person, place, and time.   Skin: Skin is warm and dry. No rash noted.   Psychiatric: He has a normal mood and affect.       Assessment:     1. Localized edema  Basic Metabolic Panel   2. Paroxysmal atrial fibrillation (HCC)     3. Chronic anticoagulation     4. Essential hypertension     5. Mixed " hyperlipidemia         Medical Decision Making:  Today's Assessment / Status / Plan:     1. LE edema, complicated by cellulitis, now resolved. Legs are looking much better. To check BMP, given Lasix and Aldactone use.    2. Paroxysmal atrial fibrillation, in sinus rhythm on Flecainide.    3. Chronic anticoagulation with Eliquis.    4. Hypertension, treated and stable. BP is good today.    5. Hyperlipidemia, treated with statin.    Legs are much better. Same medications for now. BMP to check renal function and K+ levels. FU in 6 months.    Collaborating MD: Isamar Chaudhary

## 2019-07-19 DIAGNOSIS — R60.0 LOCALIZED EDEMA: ICD-10-CM

## 2020-01-30 ENCOUNTER — OFFICE VISIT (OUTPATIENT)
Dept: CARDIOLOGY | Facility: PHYSICIAN GROUP | Age: 65
End: 2020-01-30
Payer: MEDICARE

## 2020-01-30 VITALS
BODY MASS INDEX: 35.46 KG/M2 | SYSTOLIC BLOOD PRESSURE: 118 MMHG | HEART RATE: 78 BPM | OXYGEN SATURATION: 94 % | DIASTOLIC BLOOD PRESSURE: 60 MMHG | WEIGHT: 234 LBS | HEIGHT: 68 IN

## 2020-01-30 DIAGNOSIS — E78.2 MIXED HYPERLIPIDEMIA: ICD-10-CM

## 2020-01-30 DIAGNOSIS — R60.0 LOCALIZED EDEMA: ICD-10-CM

## 2020-01-30 DIAGNOSIS — Z79.01 CHRONIC ANTICOAGULATION: ICD-10-CM

## 2020-01-30 DIAGNOSIS — I48.0 PAROXYSMAL ATRIAL FIBRILLATION (HCC): ICD-10-CM

## 2020-01-30 DIAGNOSIS — I10 ESSENTIAL HYPERTENSION: ICD-10-CM

## 2020-01-30 PROBLEM — L85.3 DRY SKIN DERMATITIS: Status: ACTIVE | Noted: 2019-04-17

## 2020-01-30 PROBLEM — K42.9 UMBILICAL HERNIA: Status: ACTIVE | Noted: 2018-12-27

## 2020-01-30 PROCEDURE — 99214 OFFICE O/P EST MOD 30 MIN: CPT | Performed by: NURSE PRACTITIONER

## 2020-01-30 RX ORDER — METHOCARBAMOL 500 MG/1
1000 TABLET, FILM COATED ORAL 4 TIMES DAILY
COMMUNITY
End: 2020-08-13

## 2020-01-30 RX ORDER — DILTIAZEM HYDROCHLORIDE 120 MG/1
120 CAPSULE, COATED, EXTENDED RELEASE ORAL DAILY
Qty: 90 CAP | Refills: 3 | Status: SHIPPED | OUTPATIENT
Start: 2020-01-30 | End: 2021-01-28 | Stop reason: SDUPTHER

## 2020-01-30 ASSESSMENT — ENCOUNTER SYMPTOMS
SHORTNESS OF BREATH: 0
NAUSEA: 0
HEADACHES: 0
BACK PAIN: 1
ABDOMINAL PAIN: 0
BRUISES/BLEEDS EASILY: 0
PALPITATIONS: 0
MYALGIAS: 0
DIZZINESS: 0
ORTHOPNEA: 0
PND: 0
LOSS OF CONSCIOUSNESS: 0
COUGH: 0
FEVER: 0
INSOMNIA: 0
CHILLS: 0

## 2020-01-30 NOTE — PROGRESS NOTES
Chief Complaint   Patient presents with   • Follow-Up   • Atrial Fibrillation   • Anticoagulation   • HTN (Controlled)   • Hyperlipidemia   • Edema       Subjective:   Venkata David is a 64 y.o. male who presents today for six month follow-up of paroxysmal atrial fibrillation, anticoagulation, hypertension and hyperlipidemia.    Venkata is a 64 year old male with history of paroxysmal atrial fibrillation on Flecainide and Cardizem, anticoagulation with Eliquis, hypertension, hyperlipidemia, and chronic LE edema, normally followed by Dr. Garcia.    Since the last visit in July 2019, he was seen in the ER in November 2019 for chest pain; EKG revealed AFib with RVR, and he was treated with IV Diltiazem. Troponin was negative.    In January 2020, just last week, he presented to the ER with back pain, treated wit Robaxin. He is slowly improving.    He is here today for follow-up. No further palpitations or episodes of chest pain. LE edema is mostly stable; he does have a small wound on right lower shin, which he is watching.      At last follow-up, he was having worsening LE edema, complicated by cellulitis. His diuretics were increased.  Breathing is stable with no dyspnea, orthopnea or PND.  Ongoing lightheadedness, but no syncope or presyncope. No fever or chills.     Past Medical History:   Diagnosis Date   • Bowel habit changes     Constipation   • CHF (congestive heart failure) ([facility]) 12/2017    Echocardiogram with rEF of 45%. January 2019: Echocardiogram with LVEF 60%.   • Chronic anticoagulation    • Disorder of thyroid     As a child only   • Hepatitis C 1980   • Hyperglycemia    • Hyperlipidemia    • Hypertension    • Indigestion    • Obesity    • Paroxysmal atrial fibrillation ([facility]) 01/2019     Echocardiogram with normal LV size, LVEF 60%. Grade I diastolic dysfunction. Mildly dilated LA. Mild MR, mild AR. Moderate TR.   • Psychiatric problem    • Renal disorder     KIDNEY FAILURE EARLY PT.   • Urinary bladder  disorder     ENLARGED PROSTATE     Past Surgical History:   Procedure Laterality Date   • TRANS URETHRAL RESECTION PROSTATE  2017    Procedure: TRANS URETHRAL RESECTION PROSTATE;  Surgeon: Jonny Chávez M.D.;  Location: SURGERY [facility];  Service: Urology   • INGUINAL HERNIA REPAIR CHILD  as child     History reviewed. No pertinent family history.  Social History     Socioeconomic History   • Marital status:      Spouse name: Not on file   • Number of children: Not on file   • Years of education: Not on file   • Highest education level: Not on file   Occupational History   • Not on file   Social Needs   • Financial resource strain: Not on file   • Food insecurity:     Worry: Not on file     Inability: Not on file   • Transportation needs:     Medical: Not on file     Non-medical: Not on file   Tobacco Use   • Smoking status: Former Smoker     Packs/day: 0.25     Last attempt to quit: 2018     Years since quittin.6   • Smokeless tobacco: Never Used   Substance and Sexual Activity   • Alcohol use: No   • Drug use: No   • Sexual activity: Not on file   Lifestyle   • Physical activity:     Days per week: Not on file     Minutes per session: Not on file   • Stress: Not on file   Relationships   • Social connections:     Talks on phone: Not on file     Gets together: Not on file     Attends Evangelical service: Not on file     Active member of club or organization: Not on file     Attends meetings of clubs or organizations: Not on file     Relationship status: Not on file   • Intimate partner violence:     Fear of current or ex partner: Not on file     Emotionally abused: Not on file     Physically abused: Not on file     Forced sexual activity: Not on file   Other Topics Concern   • Not on file   Social History Narrative   • Not on file     No Known Allergies  Outpatient Encounter Medications as of 2020   Medication Sig Dispense Refill   • methocarbamol (ROBAXIN) 500 MG Tab Take 1,000 mg  "by mouth 4 times a day.     • DILTIAZem CD (CARTIA XT) 120 MG CAPSULE SR 24 HR Take 1 Cap by mouth every day. 90 Cap 3   • apixaban (ELIQUIS) 5mg Tab Take 1 Tab by mouth 2 Times a Day. 28 Tab 0   • flecainide (TAMBOCOR) 50 MG tablet Take 1 Tab by mouth 2 times a day. 180 Tab 3   • atorvastatin (LIPITOR) 20 MG Tab Take 1 Tab by mouth every day. 90 Tab 3   • furosemide (LASIX) 20 MG Tab Take 3 Tabs by mouth every day. 90 Tab 6   • spironolactone (ALDACTONE) 50 MG Tab Take 1 Tab by mouth every day. 30 Tab 6   • [DISCONTINUED] DILTIAZem CD (CARDIZEM CD) 120 MG CAPSULE SR 24 HR Take 1 Cap by mouth every day. 90 Cap 3     No facility-administered encounter medications on file as of 1/30/2020.      Review of Systems   Constitutional: Negative for chills, fever and malaise/fatigue.   HENT: Negative for congestion.    Respiratory: Negative for cough and shortness of breath.    Cardiovascular: Positive for leg swelling. Negative for chest pain, palpitations, orthopnea and PND.        Treated with Lasix/Aldactone.   Gastrointestinal: Negative for abdominal pain and nausea.   Musculoskeletal: Positive for back pain and joint pain. Negative for myalgias.   Skin: Negative for rash.   Neurological: Negative for dizziness, loss of consciousness and headaches.   Endo/Heme/Allergies: Does not bruise/bleed easily.   Psychiatric/Behavioral: The patient does not have insomnia.         Objective:   /60 (BP Location: Left arm, Patient Position: Sitting, BP Cuff Size: Adult)   Pulse 78   Ht 1.727 m (5' 8\")   Wt 106.1 kg (234 lb)   SpO2 94%   BMI 35.58 kg/m²     Physical Exam   Constitutional: He is oriented to person, place, and time. He appears well-developed and well-nourished.   BMI 35.58   HENT:   Head: Normocephalic.   Eyes: EOM are normal.   Neck: Normal range of motion. Neck supple. No JVD present.   Cardiovascular: Normal rate, regular rhythm and normal heart sounds.   Pulmonary/Chest: Effort normal and breath sounds " normal. No respiratory distress. He has no wheezes. He has no rales.   Abdominal: Soft. Bowel sounds are normal. He exhibits no distension. There is no tenderness.   Musculoskeletal: Normal range of motion.         General: Edema present.      Comments: 1+ LE edema, R>L, stable from previous. Some mild erythema.   Neurological: He is alert and oriented to person, place, and time.   Skin: Skin is warm and dry. No rash noted.   Psychiatric: He has a normal mood and affect.     LABS AS OF 11/1/2019:  WBC 6.1  RBC 5.96  Hgb 16.4  Hct 50.2  Platelets 274  Glucose 122  BUN 29  Creatinine 1.70  GFR 41  Potassium 4.2  AST 19  ALT 29   Troponin 0<0.02    ECHOCARDIOGRAM AS OF 1/7/2019:  Normal LV size  LVEF 60%  Normal RV  Mildly dilated LA  Normal RA  Mild MR  Moderate TR    Assessment:     1. Paroxysmal atrial fibrillation (HCC)  DILTIAZem CD (CARTIA XT) 120 MG CAPSULE SR 24 HR   2. Chronic anticoagulation     3. Essential hypertension     4. Mixed hyperlipidemia     5. Localized edema         Medical Decision Making:  Today's Assessment / Status / Plan:     1. Paroxysmal atrial fibrillation, in sinus rhythm on Flecainide and Diltiazem. We will switch him from Cardizem to Cartia, as he seems to do better on this.    2. Chronic anticoagulation with Eliquis. No bleeding problems.    3. Hypertension, treated and stable.    4. Hyperlipidemia, treated with Lipitor.    5. LE edema, multifactorial, treated with Lasix and Aldactone. To watch for any cellulitis symptoms.    Same medications for now. FU in 6 months, sooner if clinical condition changes.    Collaborating MD: Sky

## 2020-02-05 DIAGNOSIS — I48.0 PAROXYSMAL ATRIAL FIBRILLATION (HCC): ICD-10-CM

## 2020-03-18 DIAGNOSIS — E78.2 MIXED HYPERLIPIDEMIA: ICD-10-CM

## 2020-03-18 RX ORDER — ATORVASTATIN CALCIUM 20 MG/1
20 TABLET, FILM COATED ORAL DAILY
Qty: 90 TAB | Refills: 3 | Status: SHIPPED | OUTPATIENT
Start: 2020-03-18 | End: 2021-01-28 | Stop reason: SDUPTHER

## 2020-03-24 DIAGNOSIS — R60.0 LOCALIZED EDEMA: ICD-10-CM

## 2020-03-24 DIAGNOSIS — I48.0 PAF (PAROXYSMAL ATRIAL FIBRILLATION) (HCC): ICD-10-CM

## 2020-03-24 RX ORDER — FUROSEMIDE 40 MG/1
TABLET ORAL
Qty: 90 TAB | Refills: 3 | Status: SHIPPED | OUTPATIENT
Start: 2020-03-24 | End: 2021-01-28 | Stop reason: SDUPTHER

## 2020-03-24 RX ORDER — FLECAINIDE ACETATE 50 MG/1
TABLET ORAL
Qty: 180 TAB | Refills: 3 | Status: SHIPPED | OUTPATIENT
Start: 2020-03-24 | End: 2021-01-28 | Stop reason: SDUPTHER

## 2020-04-22 DIAGNOSIS — R73.9 HYPERGLYCEMIA: ICD-10-CM

## 2020-04-22 DIAGNOSIS — Z79.01 CHRONIC ANTICOAGULATION: ICD-10-CM

## 2020-04-22 DIAGNOSIS — R60.0 LOCALIZED EDEMA: ICD-10-CM

## 2020-04-22 DIAGNOSIS — Z00.00 ANNUAL PHYSICAL EXAM: ICD-10-CM

## 2020-04-22 DIAGNOSIS — E78.2 MIXED HYPERLIPIDEMIA: ICD-10-CM

## 2020-04-22 DIAGNOSIS — I48.0 PAROXYSMAL ATRIAL FIBRILLATION (HCC): ICD-10-CM

## 2020-04-22 RX ORDER — SPIRONOLACTONE 50 MG/1
50 TABLET, FILM COATED ORAL DAILY
Qty: 90 TAB | Refills: 2 | Status: SHIPPED | OUTPATIENT
Start: 2020-04-22 | End: 2021-01-28 | Stop reason: SDUPTHER

## 2020-07-17 DIAGNOSIS — E78.2 MIXED HYPERLIPIDEMIA: ICD-10-CM

## 2020-07-17 DIAGNOSIS — R73.9 HYPERGLYCEMIA: ICD-10-CM

## 2020-07-17 DIAGNOSIS — Z00.00 ANNUAL PHYSICAL EXAM: ICD-10-CM

## 2020-07-17 DIAGNOSIS — Z79.01 CHRONIC ANTICOAGULATION: ICD-10-CM

## 2020-07-17 DIAGNOSIS — I48.0 PAROXYSMAL ATRIAL FIBRILLATION (HCC): ICD-10-CM

## 2020-08-13 ENCOUNTER — OFFICE VISIT (OUTPATIENT)
Dept: CARDIOLOGY | Facility: PHYSICIAN GROUP | Age: 65
End: 2020-08-13
Payer: MEDICARE

## 2020-08-13 VITALS
WEIGHT: 231 LBS | DIASTOLIC BLOOD PRESSURE: 88 MMHG | BODY MASS INDEX: 35.01 KG/M2 | HEIGHT: 68 IN | SYSTOLIC BLOOD PRESSURE: 134 MMHG | HEART RATE: 70 BPM | OXYGEN SATURATION: 96 %

## 2020-08-13 DIAGNOSIS — E66.01 CLASS 2 SEVERE OBESITY DUE TO EXCESS CALORIES WITH SERIOUS COMORBIDITY AND BODY MASS INDEX (BMI) OF 36.0 TO 36.9 IN ADULT (HCC): ICD-10-CM

## 2020-08-13 DIAGNOSIS — R60.0 LOCALIZED EDEMA: ICD-10-CM

## 2020-08-13 DIAGNOSIS — I10 ESSENTIAL HYPERTENSION: ICD-10-CM

## 2020-08-13 DIAGNOSIS — I48.0 PAROXYSMAL ATRIAL FIBRILLATION (HCC): ICD-10-CM

## 2020-08-13 DIAGNOSIS — E78.2 MIXED HYPERLIPIDEMIA: ICD-10-CM

## 2020-08-13 DIAGNOSIS — Z79.01 CHRONIC ANTICOAGULATION: ICD-10-CM

## 2020-08-13 PROCEDURE — 99214 OFFICE O/P EST MOD 30 MIN: CPT | Performed by: NURSE PRACTITIONER

## 2020-08-13 RX ORDER — NAPROXEN SODIUM 220 MG
220 TABLET ORAL DAILY
COMMUNITY
End: 2020-08-13

## 2020-08-13 ASSESSMENT — ENCOUNTER SYMPTOMS
SHORTNESS OF BREATH: 0
INSOMNIA: 0
ORTHOPNEA: 0
COUGH: 0
HEADACHES: 0
CHILLS: 0
BACK PAIN: 1
NAUSEA: 0
PND: 0
DIZZINESS: 0
PALPITATIONS: 0
MYALGIAS: 0
BRUISES/BLEEDS EASILY: 0
FEVER: 0
ABDOMINAL PAIN: 0
LOSS OF CONSCIOUSNESS: 0

## 2020-08-13 NOTE — PROGRESS NOTES
Chief Complaint   Patient presents with   • Follow-Up   • Atrial Fibrillation   • Anticoagulation   • HTN (Controlled)   • Hyperlipidemia       Subjective:   Venkata David is a 65 y.o. male who presents today for six month follow-up of paroxysmal atrial fibrillation, anticoagulation, hypertension and hyperlipidemia.    Venkata is a 65 year old male with history of paroxysmal atrial fibrillation on Flecainide and Cardizem, anticoagulation with Eliquis, hypertension, hyperlipidemia, and chronic LE edema, normally followed by Dr. Garcia. He was last seen by me in January 2020.     Since the last visit in January 2019, he has been stable. No ER visits. No further palpitations. He did have one morning where he had a little chest pressure and HR was elevated; he might have forgotten his PM Flecainide. LE edema is mostly stable; wound has completely healed. No dyspnea, orthopnea or PND.  Ongoing lightheadedness, but no syncope or presyncope. He does take Aleve every AM for his back pain.    Past Medical History:   Diagnosis Date   • Bowel habit changes     Constipation   • CHF (congestive heart failure) (HCC) 12/2017    Echocardiogram with rEF of 45%. January 2019: Echocardiogram with LVEF 60%.   • Chronic anticoagulation    • Disorder of thyroid     As a child only   • Hepatitis C 1980   • Hyperglycemia    • Hyperlipidemia    • Hypertension    • Indigestion    • Obesity    • Paroxysmal atrial fibrillation (HCC) 01/2019     Echocardiogram with normal LV size, LVEF 60%. Grade I diastolic dysfunction. Mildly dilated LA. Mild MR, mild AR. Moderate TR.   • Psychiatric problem    • Renal disorder     KIDNEY FAILURE EARLY PT.   • Urinary bladder disorder     ENLARGED PROSTATE     Past Surgical History:   Procedure Laterality Date   • TRANS URETHRAL RESECTION PROSTATE  12/8/2017    Procedure: TRANS URETHRAL RESECTION PROSTATE;  Surgeon: Jonny Chávez M.D.;  Location: SURGERY [facility];  Service: Urology   • INGUINAL  HERNIA REPAIR CHILD  as child     History reviewed. No pertinent family history.  Social History     Socioeconomic History   • Marital status:      Spouse name: Not on file   • Number of children: Not on file   • Years of education: Not on file   • Highest education level: Not on file   Occupational History   • Not on file   Social Needs   • Financial resource strain: Not on file   • Food insecurity     Worry: Not on file     Inability: Not on file   • Transportation needs     Medical: Not on file     Non-medical: Not on file   Tobacco Use   • Smoking status: Former Smoker     Packs/day: 0.25     Quit date: 2018     Years since quittin.1   • Smokeless tobacco: Never Used   Substance and Sexual Activity   • Alcohol use: No   • Drug use: No   • Sexual activity: Not on file   Lifestyle   • Physical activity     Days per week: Not on file     Minutes per session: Not on file   • Stress: Not on file   Relationships   • Social connections     Talks on phone: Not on file     Gets together: Not on file     Attends Mormonism service: Not on file     Active member of club or organization: Not on file     Attends meetings of clubs or organizations: Not on file     Relationship status: Not on file   • Intimate partner violence     Fear of current or ex partner: Not on file     Emotionally abused: Not on file     Physically abused: Not on file     Forced sexual activity: Not on file   Other Topics Concern   • Not on file   Social History Narrative   • Not on file     No Known Allergies  Outpatient Encounter Medications as of 2020   Medication Sig Dispense Refill   • spironolactone (ALDACTONE) 50 MG Tab Take 1 Tab by mouth every day. 90 Tab 2   • flecainide (TAMBOCOR) 50 MG tablet Take 1 tablet by mouth twice daily 180 Tab 3   • furosemide (LASIX) 40 MG Tab Take 1 tablet by mouth once daily 90 Tab 3   • atorvastatin (LIPITOR) 20 MG Tab Take 1 Tab by mouth every day. 90 Tab 3   • apixaban (ELIQUIS) 5mg Tab  "Take 1 Tab by mouth 2 Times a Day. 180 Tab 3   • DILTIAZem CD (CARTIA XT) 120 MG CAPSULE SR 24 HR Take 1 Cap by mouth every day. 90 Cap 3   • [DISCONTINUED] naproxen (ALEVE) 220 MG tablet Take 220 mg by mouth every day.     • [DISCONTINUED] methocarbamol (ROBAXIN) 500 MG Tab Take 1,000 mg by mouth 4 times a day.     • [DISCONTINUED] furosemide (LASIX) 20 MG Tab Take 3 Tabs by mouth every day. 90 Tab 6     No facility-administered encounter medications on file as of 8/13/2020.      Review of Systems   Constitutional: Negative for chills, fever and malaise/fatigue.   HENT: Negative for congestion.    Respiratory: Negative for cough and shortness of breath.    Cardiovascular: Positive for leg swelling. Negative for chest pain, palpitations, orthopnea and PND.        Improved, stable, treated with Lasix/Aldactone.   Gastrointestinal: Negative for abdominal pain and nausea.   Musculoskeletal: Positive for back pain and joint pain. Negative for myalgias.   Skin: Negative for rash.   Neurological: Negative for dizziness, loss of consciousness and headaches.   Endo/Heme/Allergies: Does not bruise/bleed easily.   Psychiatric/Behavioral: The patient does not have insomnia.         Objective:   /88 (BP Location: Left arm, Patient Position: Sitting, BP Cuff Size: Adult)   Pulse 70   Ht 1.727 m (5' 8\")   Wt 104.8 kg (231 lb)   SpO2 96%   BMI 35.12 kg/m²     Physical Exam   Constitutional: He is oriented to person, place, and time. He appears well-developed and well-nourished.   BMI 35.12   HENT:   Head: Normocephalic.   Eyes: EOM are normal.   Neck: Normal range of motion. Neck supple. No JVD present.   Cardiovascular: Normal rate, regular rhythm and normal heart sounds.   Pulmonary/Chest: Effort normal and breath sounds normal. No respiratory distress. He has no wheezes. He has no rales.   Abdominal: Soft. Bowel sounds are normal. He exhibits no distension. There is no abdominal tenderness.   Musculoskeletal: Normal " range of motion.         General: Edema present.      Comments: 1+ LE edema, R>L, stable from previous.  Skin changes of chronic venous stasis.   Neurological: He is alert and oriented to person, place, and time.   Skin: Skin is warm and dry. No rash noted.   Psychiatric: He has a normal mood and affect.     LABS AS OF 7/16/2020:  WBC 5.3  RBC 5.17  Hgb 15.0  Hct 46.2  Platelets 319  Cholesterol 137  Triglycerides 130  HDL 38  LDL 73  Cholesterol/HDL ratio 3.6  TSH 1.88  Glucose 105  BUN 25  Creatinine 1.26  GFR 59  Poatssium 3.9  AST 14  ALT 19    ECHOCARDIOGRAM AS OF 1/7/2019:  Normal LV size  LVEF 60%  Normal RV  Mildly dilated LA  Normal RA  Mild MR  Moderate TR    Assessment:     1. Paroxysmal atrial fibrillation ([facility])     2. Chronic anticoagulation     3. Essential hypertension     4. Mixed hyperlipidemia     5. Localized edema     6. Class 2 severe obesity due to excess calories with serious comorbidity and body mass index (BMI) of 36.0 to 36.9 in adult ([facility])         Medical Decision Making:  Today's Assessment / Status / Plan:     1. Paroxysmal atrial fibrillation, in sinus rhythm on Flecainide and Cardizem, continue same doses.    2. Chronic anticoagulation with Eliquis, no bleeding problems. He is advised NOT to take Aleve with Eliquis, due to increased risk of bleeding; he can use OTC Tylenol, or discuss other options with his PCP.    3. Hypertension, treated and stable. BP is OK today.    4. Hyperlipidemia, treated with statin. Recent lipid panel was good.    5. LE edema, chronic/stable. Erythema seems better today. Continue Lasix and Aldactone. Renal function is stable.    6. Obesity, urged to work on diet and weight loss.    7. Back pain, chronic, encouraged to FU with PCP.    Same medications for now.  FU in 6 months, will repeat echo at that time. FU sooner if clinical condition changes.    Collaborating MD: Jagdish

## 2021-01-28 ENCOUNTER — OFFICE VISIT (OUTPATIENT)
Dept: CARDIOLOGY | Facility: PHYSICIAN GROUP | Age: 66
End: 2021-01-28
Payer: MEDICARE

## 2021-01-28 VITALS
HEART RATE: 87 BPM | SYSTOLIC BLOOD PRESSURE: 134 MMHG | BODY MASS INDEX: 35.61 KG/M2 | HEIGHT: 68 IN | DIASTOLIC BLOOD PRESSURE: 74 MMHG | OXYGEN SATURATION: 95 % | WEIGHT: 235 LBS

## 2021-01-28 DIAGNOSIS — I10 ESSENTIAL HYPERTENSION: ICD-10-CM

## 2021-01-28 DIAGNOSIS — E78.2 MIXED HYPERLIPIDEMIA: ICD-10-CM

## 2021-01-28 DIAGNOSIS — Z79.01 CHRONIC ANTICOAGULATION: ICD-10-CM

## 2021-01-28 DIAGNOSIS — I48.0 PAROXYSMAL ATRIAL FIBRILLATION (HCC): ICD-10-CM

## 2021-01-28 DIAGNOSIS — R60.0 LOCALIZED EDEMA: ICD-10-CM

## 2021-01-28 PROCEDURE — 99214 OFFICE O/P EST MOD 30 MIN: CPT | Performed by: NURSE PRACTITIONER

## 2021-01-28 RX ORDER — ATORVASTATIN CALCIUM 20 MG/1
20 TABLET, FILM COATED ORAL DAILY
Qty: 90 TAB | Refills: 3 | Status: SHIPPED | OUTPATIENT
Start: 2021-01-28 | End: 2022-02-03 | Stop reason: SDUPTHER

## 2021-01-28 RX ORDER — FLECAINIDE ACETATE 50 MG/1
TABLET ORAL
Qty: 180 TAB | Refills: 3 | Status: SHIPPED | OUTPATIENT
Start: 2021-01-28 | End: 2022-02-03 | Stop reason: SDUPTHER

## 2021-01-28 RX ORDER — DILTIAZEM HYDROCHLORIDE 120 MG/1
120 CAPSULE, COATED, EXTENDED RELEASE ORAL DAILY
Qty: 90 CAP | Refills: 3 | Status: SHIPPED | OUTPATIENT
Start: 2021-01-28 | End: 2022-02-03 | Stop reason: SDUPTHER

## 2021-01-28 RX ORDER — SPIRONOLACTONE 50 MG/1
50 TABLET, FILM COATED ORAL DAILY
Qty: 90 TAB | Refills: 3 | Status: SHIPPED | OUTPATIENT
Start: 2021-01-28 | End: 2022-02-03 | Stop reason: SDUPTHER

## 2021-01-28 RX ORDER — FUROSEMIDE 40 MG/1
TABLET ORAL
Qty: 90 TAB | Refills: 3 | Status: SHIPPED | OUTPATIENT
Start: 2021-01-28 | End: 2022-02-03 | Stop reason: SDUPTHER

## 2021-01-28 ASSESSMENT — ENCOUNTER SYMPTOMS
INSOMNIA: 0
PND: 0
FEVER: 0
SHORTNESS OF BREATH: 0
HEADACHES: 0
PALPITATIONS: 0
COUGH: 0
BRUISES/BLEEDS EASILY: 0
CHILLS: 0
LOSS OF CONSCIOUSNESS: 0
DIZZINESS: 0
NAUSEA: 0
ORTHOPNEA: 0
MYALGIAS: 0
ABDOMINAL PAIN: 0
BACK PAIN: 1

## 2021-01-28 NOTE — PROGRESS NOTES
Chief Complaint   Patient presents with   • Follow-Up   • Atrial Fibrillation   • Anticoagulation   • HTN (Controlled)   • Hyperlipidemia       Subjective:   Venkata David is a 65 y.o. male who presents today for annul follow-up of paroxysmal atrial fibrillation, anticoagulation, hypertension and hyperlipidemia.    Venkata is a 65 year old male with history of paroxysmal atrial fibrillation on Flecainide and Cardizem, anticoagulation with Eliquis, hypertension, hyperlipidemia, and chronic LE edema, last seen by me in August 2020.     He is here today for six month follow-up. Generally, he has been doing well this past year. When he is stressed, he does have some mild palpitations, but they are not sustained. LE edema is mostly stable; he is compliant with Lasix and Aladctone. No chest pain, pressure or discomfort. Breathing is stable with no dyspnea, orthopnea or PND. BP has been stable. He does have ongoing back pain, but it has been more stable.    Past Medical History:   Diagnosis Date   • Bowel habit changes     Constipation   • CHF (congestive heart failure) (HCC) 12/2017    Echocardiogram with rEF of 45%. January 2019: Echocardiogram with LVEF 60%.   • Chronic anticoagulation    • Disorder of thyroid     As a child only   • Hepatitis C 1980   • Hyperglycemia    • Hyperlipidemia    • Hypertension    • Indigestion    • Obesity    • Paroxysmal atrial fibrillation (HCC) 01/2019     Echocardiogram with normal LV size, LVEF 60%. Grade I diastolic dysfunction. Mildly dilated LA. Mild MR, mild AR. Moderate TR.   • Psychiatric problem    • Renal disorder     KIDNEY FAILURE EARLY PT.   • Urinary bladder disorder     ENLARGED PROSTATE     Past Surgical History:   Procedure Laterality Date   • TRANS URETHRAL RESECTION PROSTATE  12/8/2017    Procedure: TRANS URETHRAL RESECTION PROSTATE;  Surgeon: Jonny Chávez M.D.;  Location: SURGERY [facility];  Service: Urology   • INGUINAL HERNIA REPAIR CHILD  as child      History reviewed. No pertinent family history.  Social History     Socioeconomic History   • Marital status:      Spouse name: Not on file   • Number of children: Not on file   • Years of education: Not on file   • Highest education level: Not on file   Occupational History   • Not on file   Social Needs   • Financial resource strain: Not on file   • Food insecurity     Worry: Not on file     Inability: Not on file   • Transportation needs     Medical: Not on file     Non-medical: Not on file   Tobacco Use   • Smoking status: Former Smoker     Packs/day: 0.25     Quit date: 2018     Years since quittin.6   • Smokeless tobacco: Never Used   Substance and Sexual Activity   • Alcohol use: No   • Drug use: No   • Sexual activity: Not on file   Lifestyle   • Physical activity     Days per week: Not on file     Minutes per session: Not on file   • Stress: Not on file   Relationships   • Social connections     Talks on phone: Not on file     Gets together: Not on file     Attends Christianity service: Not on file     Active member of club or organization: Not on file     Attends meetings of clubs or organizations: Not on file     Relationship status: Not on file   • Intimate partner violence     Fear of current or ex partner: Not on file     Emotionally abused: Not on file     Physically abused: Not on file     Forced sexual activity: Not on file   Other Topics Concern   • Not on file   Social History Narrative   • Not on file     No Known Allergies  Outpatient Encounter Medications as of 2021   Medication Sig Dispense Refill   • spironolactone (ALDACTONE) 50 MG Tab Take 1 Tab by mouth every day. 90 Tab 3   • flecainide (TAMBOCOR) 50 MG tablet Take 1 tablet by mouth twice daily 180 Tab 3   • furosemide (LASIX) 40 MG Tab Take 1 tablet by mouth once daily 90 Tab 3   • DILTIAZem CD (CARTIA XT) 120 MG CAPSULE SR 24 HR Take 1 Cap by mouth every day. 90 Cap 3   • atorvastatin (LIPITOR) 20 MG Tab Take 1 Tab  "by mouth every day. 90 Tab 3   • apixaban (ELIQUIS) 5mg Tab Take 1 Tab by mouth 2 Times a Day. 180 Tab 3   • [DISCONTINUED] spironolactone (ALDACTONE) 50 MG Tab Take 1 Tab by mouth every day. 90 Tab 2   • [DISCONTINUED] flecainide (TAMBOCOR) 50 MG tablet Take 1 tablet by mouth twice daily 180 Tab 3   • [DISCONTINUED] furosemide (LASIX) 40 MG Tab Take 1 tablet by mouth once daily 90 Tab 3   • [DISCONTINUED] atorvastatin (LIPITOR) 20 MG Tab Take 1 Tab by mouth every day. 90 Tab 3   • [DISCONTINUED] apixaban (ELIQUIS) 5mg Tab Take 1 Tab by mouth 2 Times a Day. 180 Tab 3   • [DISCONTINUED] DILTIAZem CD (CARTIA XT) 120 MG CAPSULE SR 24 HR Take 1 Cap by mouth every day. 90 Cap 3     No facility-administered encounter medications on file as of 1/28/2021.      Review of Systems   Constitutional: Negative for chills, fever and malaise/fatigue.   HENT: Negative for congestion.    Respiratory: Negative for cough and shortness of breath.    Cardiovascular: Positive for leg swelling. Negative for chest pain, palpitations, orthopnea and PND.        Improved, stable, treated with Lasix/Aldactone.   Gastrointestinal: Negative for abdominal pain and nausea.   Musculoskeletal: Positive for back pain and joint pain. Negative for myalgias.   Skin: Negative for rash.   Neurological: Negative for dizziness, loss of consciousness and headaches.   Endo/Heme/Allergies: Does not bruise/bleed easily.   Psychiatric/Behavioral: The patient does not have insomnia.         Objective:   /74 (BP Location: Left arm, Patient Position: Sitting, BP Cuff Size: Adult)   Pulse 87   Ht 1.727 m (5' 8\")   Wt 107 kg (235 lb)   SpO2 95%   BMI 35.73 kg/m²     Physical Exam   Constitutional: He is oriented to person, place, and time. He appears well-developed and well-nourished.   BMI 35.12   HENT:   Head: Normocephalic.   Eyes: EOM are normal.   Neck: Normal range of motion. Neck supple. No JVD present.   Cardiovascular: Normal rate, regular rhythm " and normal heart sounds.   Pulmonary/Chest: Effort normal and breath sounds normal. No respiratory distress. He has no wheezes. He has no rales.   Abdominal: Soft. Bowel sounds are normal. He exhibits no distension. There is no abdominal tenderness.   Musculoskeletal: Normal range of motion.         General: Edema present.      Comments: 1+ LE edema, R>L, stable from previous.  Skin changes of chronic venous stasis.   Neurological: He is alert and oriented to person, place, and time.   Skin: Skin is warm and dry. No rash noted.   Psychiatric: He has a normal mood and affect.     ECHOCARDIOGRAM AS OF 1/7/2019:  Normal LV size  LVEF 60%  Normal RV  Mildly dilated LA  Normal RA  Mild MR  Moderate TR    LABS AS OF 7/16/2020:  WBC 5.3  RBC 5.17  Hgb 15.0  Hct 46.2  Platelets 319  Cholesterol 137  Triglycerides 130  HDL 38  LDL 73  Cholesterol/HDL ratio 3.6  TSH 1.88  Glucose 105  BUN 25  Creatinine 1.26  GFR 59  Poatssium 3.9  AST 14  ALT 19    Assessment:     1. Paroxysmal atrial fibrillation (HCC)  flecainide (TAMBOCOR) 50 MG tablet    DILTIAZem CD (CARTIA XT) 120 MG CAPSULE SR 24 HR    apixaban (ELIQUIS) 5mg Tab   2. Chronic anticoagulation  apixaban (ELIQUIS) 5mg Tab   3. Essential hypertension     4. Mixed hyperlipidemia  atorvastatin (LIPITOR) 20 MG Tab   5. Localized edema  spironolactone (ALDACTONE) 50 MG Tab    furosemide (LASIX) 40 MG Tab       Medical Decision Making:  Today's Assessment / Status / Plan:     1. Paroxysmal atrial fibrillation, in sinus rhythm on Flecainide and Diltiazem. These are renewed.    2. Chronic anticoagulation with Eliquis. No bleeding problems.    3. Hypertension, treated with Diltiazem and Aldactone. BP is good today.    4. Hyperlipidemia, treated with Lipitor.    5. LE edema, on Lasix and Aldactone, stable.    Same medications for now. FU with me in 6 months, sooner if clinical condition changes.

## 2021-02-05 ENCOUNTER — TELEPHONE (OUTPATIENT)
Dept: CARDIOLOGY | Facility: MEDICAL CENTER | Age: 66
End: 2021-02-05

## 2021-02-05 DIAGNOSIS — I48.0 PAROXYSMAL ATRIAL FIBRILLATION (HCC): ICD-10-CM

## 2021-02-05 DIAGNOSIS — Z79.01 CHRONIC ANTICOAGULATION: ICD-10-CM

## 2021-02-05 NOTE — TELEPHONE ENCOUNTER
Spoke with Alyssa at [facility], patient has application that was signed by AB on 1/28/21, needed to have verbal of where to ship medication, gave verbal to ship to patient's house.

## 2021-02-05 NOTE — TELEPHONE ENCOUNTER
AB Deleon called from [employer] to get a ship to address for the apixaban (ELIQUIS) 5mg Tab. If you have any questions please call Adrienne back at [phone]. They van take a verbal over the phone.     Thank you,  Hansa HORAN

## 2021-02-10 ENCOUNTER — TELEPHONE (OUTPATIENT)
Dept: CARDIOLOGY | Facility: MEDICAL CENTER | Age: 66
End: 2021-02-10

## 2021-02-10 NOTE — TELEPHONE ENCOUNTER
AB Lowe from [employer] called asking if they could adjust the quantity of the Pts Eliquis. Please call Chrissy back at [phone] ext [phone].

## 2021-07-29 ENCOUNTER — OFFICE VISIT (OUTPATIENT)
Dept: CARDIOLOGY | Facility: PHYSICIAN GROUP | Age: 66
End: 2021-07-29
Payer: MEDICARE

## 2021-07-29 VITALS
HEIGHT: 68 IN | DIASTOLIC BLOOD PRESSURE: 60 MMHG | WEIGHT: 233 LBS | BODY MASS INDEX: 35.31 KG/M2 | SYSTOLIC BLOOD PRESSURE: 110 MMHG | HEART RATE: 84 BPM | OXYGEN SATURATION: 92 %

## 2021-07-29 DIAGNOSIS — R60.0 LOCALIZED EDEMA: ICD-10-CM

## 2021-07-29 DIAGNOSIS — I48.0 PAROXYSMAL ATRIAL FIBRILLATION (HCC): ICD-10-CM

## 2021-07-29 DIAGNOSIS — I10 ESSENTIAL HYPERTENSION: ICD-10-CM

## 2021-07-29 DIAGNOSIS — E78.2 MIXED HYPERLIPIDEMIA: ICD-10-CM

## 2021-07-29 DIAGNOSIS — Z79.01 CHRONIC ANTICOAGULATION: ICD-10-CM

## 2021-07-29 PROCEDURE — 99214 OFFICE O/P EST MOD 30 MIN: CPT | Performed by: NURSE PRACTITIONER

## 2021-07-29 ASSESSMENT — ENCOUNTER SYMPTOMS
COUGH: 0
INSOMNIA: 0
HEADACHES: 0
SHORTNESS OF BREATH: 0
ABDOMINAL PAIN: 0
PALPITATIONS: 0
ORTHOPNEA: 0
PND: 0
DIZZINESS: 0
MYALGIAS: 0
BACK PAIN: 1
NAUSEA: 0
FEVER: 0
BRUISES/BLEEDS EASILY: 0
LOSS OF CONSCIOUSNESS: 0
CHILLS: 0

## 2021-07-29 NOTE — PROGRESS NOTES
Chief Complaint   Patient presents with   • Follow-Up   • Atrial Fibrillation   • Anticoagulation   • HTN (Controlled)   • Hyperlipidemia   • Edema       Subjective:   Venkata David is a 66 y.o. male who presents today for six month follow-up of PAFib, anticoagulation, HTN, hyperlipidemia, and edema.    Venkata is a 66 year old male with history of paroxysmal atrial fibrillation on Flecainide and Cardizem, anticoagulation with Eliquis, hypertension, hyperlipidemia, and chronic LE edema, last seen by me in January 2021.    On July 20, 2021, he presented to the [facility] ER with fatigue and shortness of breath. EKG revealed sinus rhythm; CXR was negative for pneumonia or effusion, and labs (including troponin and proBNP) were all basically normal/stable. Only abnormal finding was a distended esophagus (known from previous CT scan), thought to be contributing to GERD. He was treated symptomatically.     He is here today for six month follow-up. Overall, he remains stable. No further symptoms like the ER visit.  LE edema is mostly stable; he does have some open wounds on his anterior legs, that he is treating with topical ointments. He is compliant with Lasix and Aladctone. No chest pain, pressure or discomfort. No palpitations or fluttering of his heart. No orthopnea or PND. BP has been stable.     Past Medical History:   Diagnosis Date   • Bowel habit changes     Constipation   • CHF (congestive heart failure) ([facility]) 12/2017    Echocardiogram with rEF of 45%. January 2019: Echocardiogram with LVEF 60%.   • Chronic anticoagulation    • Disorder of thyroid     As a child only   • Hepatitis C 1980   • Hyperglycemia    • Hyperlipidemia    • Hypertension    • Indigestion    • Obesity    • Paroxysmal atrial fibrillation (HCC) 01/2019     Echocardiogram with normal LV size, LVEF 60%. Grade I diastolic dysfunction. Mildly dilated LA. Mild MR, mild AR. Moderate TR.   • Psychiatric problem    • Renal disorder     KIDNEY FAILURE  EARLY PT.   • Urinary bladder disorder     ENLARGED PROSTATE     Past Surgical History:   Procedure Laterality Date   • TRANS URETHRAL RESECTION PROSTATE  12/8/2017    Procedure: TRANS URETHRAL RESECTION PROSTATE;  Surgeon: Jonny Chávez M.D.;  Location: SURGERY [facility];  Service: Urology   • INGUINAL HERNIA REPAIR CHILD  as child     History reviewed. No pertinent family history.  Social History     Socioeconomic History   • Marital status:      Spouse name: Not on file   • Number of children: Not on file   • Years of education: Not on file   • Highest education level: Not on file   Occupational History   • Not on file   Tobacco Use   • Smoking status: Former Smoker     Packs/day: 0.25     Quit date: 6/6/2018     Years since quitting: 3.1   • Smokeless tobacco: Never Used   Substance and Sexual Activity   • Alcohol use: No   • Drug use: No   • Sexual activity: Not on file   Other Topics Concern   • Not on file   Social History Narrative   • Not on file     Social Determinants of Health     Financial Resource Strain:    • Difficulty of Paying Living Expenses:    Food Insecurity:    • Worried About Running Out of Food in the Last Year:    • Ran Out of Food in the Last Year:    Transportation Needs:    • Lack of Transportation (Medical):    • Lack of Transportation (Non-Medical):    Physical Activity:    • Days of Exercise per Week:    • Minutes of Exercise per Session:    Stress:    • Feeling of Stress :    Social Connections:    • Frequency of Communication with Friends and Family:    • Frequency of Social Gatherings with Friends and Family:    • Attends [employer] Services:    • Active Member of Clubs or Organizations:    • Attends Club or Organization Meetings:    • Marital Status:    Intimate Partner Violence:    • Fear of Current or Ex-Partner:    • Emotionally Abused:    • Physically Abused:    • Sexually Abused:      No Known Allergies  Outpatient Encounter Medications as of 7/29/2021  "  Medication Sig Dispense Refill   • Glucosamine-Chondroitin (GLUCOSAMINE CHONDR COMPLEX PO) Take  by mouth every day.     • Multiple Vitamins-Minerals (CENTRUM ADULTS PO) Take  by mouth every day.     • spironolactone (ALDACTONE) 50 MG Tab Take 1 Tab by mouth every day. 90 Tab 3   • flecainide (TAMBOCOR) 50 MG tablet Take 1 tablet by mouth twice daily 180 Tab 3   • furosemide (LASIX) 40 MG Tab Take 1 tablet by mouth once daily 90 Tab 3   • DILTIAZem CD (CARTIA XT) 120 MG CAPSULE SR 24 HR Take 1 Cap by mouth every day. 90 Cap 3   • atorvastatin (LIPITOR) 20 MG Tab Take 1 Tab by mouth every day. 90 Tab 3   • apixaban (ELIQUIS) 5mg Tab Take 1 Tab by mouth 2 Times a Day. 180 Tab 3     No facility-administered encounter medications on file as of 7/29/2021.     Review of Systems   Constitutional: Negative for chills, fever and malaise/fatigue.   HENT: Negative for congestion.    Respiratory: Negative for cough and shortness of breath.    Cardiovascular: Positive for leg swelling. Negative for chest pain, palpitations, orthopnea and PND.        Stable, treated with Lasix/Aldactone.   Gastrointestinal: Negative for abdominal pain and nausea.   Musculoskeletal: Positive for back pain and joint pain. Negative for myalgias.   Skin: Negative for rash.   Neurological: Negative for dizziness, loss of consciousness and headaches.   Endo/Heme/Allergies: Does not bruise/bleed easily.   Psychiatric/Behavioral: The patient does not have insomnia.         Objective:   /60 (BP Location: Left arm, Patient Position: Sitting, BP Cuff Size: Adult)   Pulse 84   Ht 1.727 m (5' 8\")   Wt 106 kg (233 lb)   SpO2 92%   BMI 35.43 kg/m²     Physical Exam   Constitutional: He is oriented to person, place, and time. He appears well-developed.   BMI 35.43   HENT:   Head: Normocephalic.   Neck: No JVD present.   Cardiovascular: Normal rate, regular rhythm and normal heart sounds.   Pulmonary/Chest: Effort normal and breath sounds normal. No " respiratory distress. He has no wheezes. He has no rales.   Abdominal: Soft. Bowel sounds are normal. He exhibits no distension. There is no abdominal tenderness.   Musculoskeletal:         General: Normal range of motion.      Cervical back: Normal range of motion and neck supple.      Comments: 1+ LE edema, R>L, stable from previous.  Skin changes of chronic venous stasis.   Neurological: He is alert and oriented to person, place, and time.   Skin: Skin is warm and dry. No rash noted.     ECHOCARDIOGRAM AS OF 1/7/2019:  Normal LV size  LVEF 60%  Normal RV  Mildly dilated LA  Normal RA  Mild MR  Moderate TR    RESULTS OF LABS OF 7/20/2021:  Glucose 114  BUN 22  Creatinine 1.41  GFR 51  Potassium 4.4  AST 19  ALT 36  Troponin <0.02  BNP 44  INR 1.0  WBC 5.8  RBC 5.53  Hgb 15.7  Platelets 303    RESULTS OF CXR OF 7/20/2021:  No focal consolidation  Persistent severe distention of the esophagus    Assessment:     1. Paroxysmal atrial fibrillation (HCC)  TSH   2. Chronic anticoagulation     3. Essential hypertension     4. Mixed hyperlipidemia  Lipid Profile   5. Localized edema         Medical Decision Making:  Today's Assessment / Status / Plan:     1. Paroxysmal atrial fibrillation, in sinus rhythm on Flecainide and Diltiazem. Recent EKG from the ER showed sinus rhythm.    2. Chronic anticoagulation with Eliquis. No bleeding problems.    3. Hypertension, treated with Diltiazem, stable.    4. Hyperlipidemia, treated with Lipitor. To check fasting lipid panel.    5. LE edema, on Lasix and Aldactone, mostly stable. He does have some weeping wound, treated topically. We have discussed compression socks and elevating legs. Watch salt intake too.    Same medications for now. Labs as above. FU in 6 months.

## 2021-07-30 DIAGNOSIS — E78.2 MIXED HYPERLIPIDEMIA: ICD-10-CM

## 2022-02-03 ENCOUNTER — OFFICE VISIT (OUTPATIENT)
Dept: CARDIOLOGY | Facility: PHYSICIAN GROUP | Age: 67
End: 2022-02-03
Payer: MEDICARE

## 2022-02-03 VITALS
SYSTOLIC BLOOD PRESSURE: 130 MMHG | BODY MASS INDEX: 35.92 KG/M2 | HEIGHT: 68 IN | RESPIRATION RATE: 20 BRPM | DIASTOLIC BLOOD PRESSURE: 74 MMHG | OXYGEN SATURATION: 96 % | WEIGHT: 237 LBS | HEART RATE: 78 BPM

## 2022-02-03 DIAGNOSIS — E66.01 CLASS 2 SEVERE OBESITY DUE TO EXCESS CALORIES WITH SERIOUS COMORBIDITY AND BODY MASS INDEX (BMI) OF 36.0 TO 36.9 IN ADULT (HCC): ICD-10-CM

## 2022-02-03 DIAGNOSIS — I10 PRIMARY HYPERTENSION: ICD-10-CM

## 2022-02-03 DIAGNOSIS — Z79.01 CHRONIC ANTICOAGULATION: ICD-10-CM

## 2022-02-03 DIAGNOSIS — I48.0 PAROXYSMAL ATRIAL FIBRILLATION (HCC): ICD-10-CM

## 2022-02-03 DIAGNOSIS — R60.0 LOCALIZED EDEMA: ICD-10-CM

## 2022-02-03 DIAGNOSIS — E78.2 MIXED HYPERLIPIDEMIA: ICD-10-CM

## 2022-02-03 PROCEDURE — 99214 OFFICE O/P EST MOD 30 MIN: CPT | Performed by: NURSE PRACTITIONER

## 2022-02-03 RX ORDER — DILTIAZEM HYDROCHLORIDE 120 MG/1
120 CAPSULE, COATED, EXTENDED RELEASE ORAL DAILY
Qty: 100 CAPSULE | Refills: 3 | Status: SHIPPED | OUTPATIENT
Start: 2022-02-03 | End: 2023-03-15

## 2022-02-03 RX ORDER — ATORVASTATIN CALCIUM 20 MG/1
20 TABLET, FILM COATED ORAL DAILY
Qty: 100 TABLET | Refills: 3 | Status: SHIPPED | OUTPATIENT
Start: 2022-02-03 | End: 2023-03-15

## 2022-02-03 RX ORDER — SPIRONOLACTONE 50 MG/1
50 TABLET, FILM COATED ORAL DAILY
Qty: 100 TABLET | Refills: 3 | Status: SHIPPED | OUTPATIENT
Start: 2022-02-03 | End: 2023-06-30

## 2022-02-03 RX ORDER — FLECAINIDE ACETATE 50 MG/1
TABLET ORAL
Qty: 200 TABLET | Refills: 3 | Status: SHIPPED | OUTPATIENT
Start: 2022-02-03 | End: 2023-03-15

## 2022-02-03 RX ORDER — FUROSEMIDE 40 MG/1
TABLET ORAL
Qty: 100 TABLET | Refills: 3 | Status: SHIPPED | OUTPATIENT
Start: 2022-02-03 | End: 2023-06-30

## 2022-02-03 ASSESSMENT — ENCOUNTER SYMPTOMS
ORTHOPNEA: 0
ABDOMINAL PAIN: 0
DIZZINESS: 0
HEADACHES: 0
BACK PAIN: 1
PND: 0
CHILLS: 0
BRUISES/BLEEDS EASILY: 0
FEVER: 0
LOSS OF CONSCIOUSNESS: 0
SHORTNESS OF BREATH: 0
NAUSEA: 0
COUGH: 0
MYALGIAS: 0
PALPITATIONS: 1
INSOMNIA: 0

## 2022-02-03 NOTE — PROGRESS NOTES
Chief Complaint   Patient presents with   • Follow-Up   • Atrial Fibrillation   • Anticoagulation   • HTN (Controlled)   • Hyperlipidemia   • Edema       Subjective     Venkata David is a 66 y.o. male who presents today for six month follow-up of PAFib, anticoagulation, HTN, and hyperlipidemia.    Venkata is a 66 year old male with history of paroxysmal atrial fibrillation on Flecainide and Cardizem, anticoagulation with Eliquis, hypertension, hyperlipidemia, and chronic LE edema, last seen by me in July 2021.     Since that last visit, he was seen at [facility] in October 2021 with Covid pneumonia, treated with Remdesivir and dexamethasone. He did not need oxygen. He has since recovered.     He is here today for six month follow-up. Overall, he remains stable, but he does note some increased palpitations, both at rest and with exertion. LE edema is mostly stable; he does take Lasix and Aldactone daily; no recent open/weeping wounds. No chest pain, pressure or discomfort. No orthopnea or PND; some lightheadedness, but no syncope or presyncope. BP has been stable.     Past Medical History:   Diagnosis Date   • Bowel habit changes     Constipation   • CHF (congestive heart failure) (HCC) 12/2017    Echocardiogram with rEF of 45%. January 2019: Echocardiogram with LVEF 60%.   • Chronic anticoagulation    • Disorder of thyroid     As a child only   • Hepatitis C 1980   • Hyperglycemia    • Hyperlipidemia    • Hypertension    • Indigestion    • Obesity    • Paroxysmal atrial fibrillation (HCC) 01/2019     Echocardiogram with normal LV size, LVEF 60%. Grade I diastolic dysfunction. Mildly dilated LA. Mild MR, mild AR. Moderate TR.   • Psychiatric problem    • Renal disorder     KIDNEY FAILURE EARLY PT.   • Urinary bladder disorder     ENLARGED PROSTATE     Past Surgical History:   Procedure Laterality Date   • TRANS URETHRAL RESECTION PROSTATE  12/8/2017    Procedure: TRANS URETHRAL RESECTION PROSTATE;  Surgeon: Jonny MONDRAGON  MONE Chávez;  Location: SURGERY [facility];  Service: Urology   • INGUINAL HERNIA REPAIR CHILD  as child     History reviewed. No pertinent family history.  Social History     Socioeconomic History   • Marital status:      Spouse name: Not on file   • Number of children: Not on file   • Years of education: Not on file   • Highest education level: Not on file   Occupational History   • Not on file   Tobacco Use   • Smoking status: Former Smoker     Packs/day: 0.25     Quit date: 6/6/2018     Years since quitting: 3.6   • Smokeless tobacco: Never Used   Substance and Sexual Activity   • Alcohol use: No   • Drug use: No   • Sexual activity: Not on file   Other Topics Concern   • Not on file   Social History Narrative   • Not on file     Social Determinants of Health     Financial Resource Strain:    • Difficulty of Paying Living Expenses: Not on file   Food Insecurity:    • Worried About Running Out of Food in the Last Year: Not on file   • Ran Out of Food in the Last Year: Not on file   Transportation Needs:    • Lack of Transportation (Medical): Not on file   • Lack of Transportation (Non-Medical): Not on file   Physical Activity:    • Days of Exercise per Week: Not on file   • Minutes of Exercise per Session: Not on file   Stress:    • Feeling of Stress : Not on file   Social Connections:    • Frequency of Communication with Friends and Family: Not on file   • Frequency of Social Gatherings with Friends and Family: Not on file   • Attends [employer] Services: Not on file   • Active Member of Clubs or Organizations: Not on file   • Attends Club or Organization Meetings: Not on file   • Marital Status: Not on file   Intimate Partner Violence:    • Fear of Current or Ex-Partner: Not on file   • Emotionally Abused: Not on file   • Physically Abused: Not on file   • Sexually Abused: Not on file   Housing Stability:    • Unable to Pay for Housing in the Last Year: Not on file   • Number of Places Lived in  "the Last Year: Not on file   • Unstable Housing in the Last Year: Not on file     No Known Allergies  Outpatient Encounter Medications as of 2/3/2022   Medication Sig Dispense Refill   • Glucosamine-Chondroitin (GLUCOSAMINE CHONDR COMPLEX PO) Take  by mouth every day.     • Multiple Vitamins-Minerals (CENTRUM ADULTS PO) Take  by mouth every day.     • spironolactone (ALDACTONE) 50 MG Tab Take 1 Tab by mouth every day. 90 Tab 3   • flecainide (TAMBOCOR) 50 MG tablet Take 1 tablet by mouth twice daily 180 Tab 3   • furosemide (LASIX) 40 MG Tab Take 1 tablet by mouth once daily 90 Tab 3   • DILTIAZem CD (CARTIA XT) 120 MG CAPSULE SR 24 HR Take 1 Cap by mouth every day. 90 Cap 3   • atorvastatin (LIPITOR) 20 MG Tab Take 1 Tab by mouth every day. 90 Tab 3   • apixaban (ELIQUIS) 5mg Tab Take 1 Tab by mouth 2 Times a Day. 180 Tab 3     No facility-administered encounter medications on file as of 2/3/2022.     Review of Systems   Constitutional: Negative for chills, fever and malaise/fatigue.   HENT: Negative for congestion.    Respiratory: Negative for cough and shortness of breath.    Cardiovascular: Positive for palpitations and leg swelling. Negative for chest pain, orthopnea and PND.        Stable, treated with Lasix/Aldactone.   Gastrointestinal: Negative for abdominal pain and nausea.   Musculoskeletal: Positive for back pain and joint pain. Negative for myalgias.   Skin: Negative for rash.   Neurological: Negative for dizziness, loss of consciousness and headaches.   Endo/Heme/Allergies: Does not bruise/bleed easily.   Psychiatric/Behavioral: The patient does not have insomnia.           Objective     /74 (BP Location: Left arm, Patient Position: Sitting, BP Cuff Size: Adult)   Pulse 78   Resp 20   Ht 1.727 m (5' 8\")   Wt 108 kg (237 lb)   SpO2 96%   BMI 36.04 kg/m²     Physical Exam  Constitutional:       Appearance: He is well-developed.      Comments: BMI 36.04 (weight is up slightly)   HENT:      " Head: Normocephalic.   Neck:      Vascular: No JVD.   Cardiovascular:      Rate and Rhythm: Normal rate and regular rhythm.      Heart sounds: Normal heart sounds.   Pulmonary:      Effort: Pulmonary effort is normal. No respiratory distress.      Breath sounds: Normal breath sounds. No wheezing or rales.   Abdominal:      General: Bowel sounds are normal. There is no distension.      Palpations: Abdomen is soft.      Tenderness: There is no abdominal tenderness.   Musculoskeletal:         General: Normal range of motion.      Cervical back: Normal range of motion and neck supple.      Comments: 1+ LE edema, R>L, stable from previous.  Skin changes of chronic venous stasis.   Skin:     General: Skin is warm and dry.      Findings: No rash.   Neurological:      Mental Status: He is alert and oriented to person, place, and time.       EKG ordered and interpreted by me today reveals sinus rhythm at 64bpm with RBBB (known).    LABS AS OF 10/20/2021:  WBC 4.6  RBC 5.46  Hgb 15.3  Hct 47.1  Platelets 364  Glucose 112  BUN 28  Creatinine 1.42  GFR 50  Potassium 4.1  AST 36  ALT 36    CT SCAN OF CHEST OF 10/20/2021:  1. Marked distention of the thoracic esophagus with focal area of narrowing seen near the gastroesophageal junction.  2. Central and peripheral bilateral groundglass pulmonary opacities compatible with multifocal pneumonia. Atypical or viral pneumonia including Covid pneumonia not excluded.    LABS AS OF 7/29/2021:  TSH 1.92  Cholesterol 145  Triglycerides 126  HDL 45  LDL 78  Cholesterol/HDL ration 3.2    Echocardiogram of January 2019:  Normal LV size, LVEF 60%. Grade I diastolic dysfunction. Mildly dilated LA. Mild MR, mild AR. Moderate TR.               Assessment & Plan     1. Paroxysmal atrial fibrillation (HCC)  EKG    EKG   2. Chronic anticoagulation     3. Primary hypertension     4. Mixed hyperlipidemia     5. Localized edema     6. Class 2 severe obesity due to excess calories with serious  comorbidity and body mass index (BMI) of 36.0 to 36.9 in adult ([facility])         Medical Decision Making: Today's Assessment/Status/Plan:      1. Covid-19 infection in October 2021, treated with Remdesivir and dexamethasone, recovered. He does have some increased palpitations, with history of PAFib. Can take additional Diltiazem 120mg PRN sustained palpitations. Will check echocardiogram, given last one was in 2019. If palpitations continue, may need ZioPatch to assess for increased breakthrough AFib; may need to titrate Flecainide too.    2. Chronic anticoagulation with Eliquis. No bleeding problems.    3. Hypertension, on Cartia 120mg and Aldactone 50mg, stable.    4. Hyperlipidemia, treated with Lipitor 20mg. Last lipid panel was good.    5. LE edema, chronic/stable, on Lasix and Aldactone; renal function is stable.    As above, to obtain echocardiogram. Can take additional Cartia PRN sustained palpitations. Follow-up with me after echo; consider ZioPatch if palpitations worsen.

## 2022-04-07 ENCOUNTER — OFFICE VISIT (OUTPATIENT)
Dept: CARDIOLOGY | Facility: PHYSICIAN GROUP | Age: 67
End: 2022-04-07
Payer: MEDICARE

## 2022-04-07 VITALS
WEIGHT: 239.8 LBS | HEIGHT: 68 IN | HEART RATE: 76 BPM | SYSTOLIC BLOOD PRESSURE: 112 MMHG | BODY MASS INDEX: 36.34 KG/M2 | DIASTOLIC BLOOD PRESSURE: 62 MMHG | RESPIRATION RATE: 14 BRPM | OXYGEN SATURATION: 96 %

## 2022-04-07 DIAGNOSIS — I10 PRIMARY HYPERTENSION: ICD-10-CM

## 2022-04-07 DIAGNOSIS — E66.01 CLASS 2 SEVERE OBESITY DUE TO EXCESS CALORIES WITH SERIOUS COMORBIDITY AND BODY MASS INDEX (BMI) OF 36.0 TO 36.9 IN ADULT (HCC): ICD-10-CM

## 2022-04-07 DIAGNOSIS — E78.2 MIXED HYPERLIPIDEMIA: ICD-10-CM

## 2022-04-07 DIAGNOSIS — Z79.01 CHRONIC ANTICOAGULATION: ICD-10-CM

## 2022-04-07 DIAGNOSIS — I48.0 PAROXYSMAL ATRIAL FIBRILLATION (HCC): ICD-10-CM

## 2022-04-07 DIAGNOSIS — R60.0 LOCALIZED EDEMA: ICD-10-CM

## 2022-04-07 PROCEDURE — 99214 OFFICE O/P EST MOD 30 MIN: CPT | Performed by: NURSE PRACTITIONER

## 2022-04-07 ASSESSMENT — ENCOUNTER SYMPTOMS
ABDOMINAL PAIN: 0
INSOMNIA: 0
PALPITATIONS: 1
HEADACHES: 0
ORTHOPNEA: 0
MYALGIAS: 0
COUGH: 0
CHILLS: 0
BRUISES/BLEEDS EASILY: 0
BACK PAIN: 1
LOSS OF CONSCIOUSNESS: 0
FEVER: 0
DIZZINESS: 0
SHORTNESS OF BREATH: 0
PND: 0
NAUSEA: 0

## 2022-04-07 NOTE — PROGRESS NOTES
Chief Complaint   Patient presents with   • Follow-Up   • Results   • Atrial Fibrillation   • Anticoagulation   • HTN (Controlled)   • Hyperlipidemia       Subjective     Venkata David is a 66 y.o. male who presents today for follow-up of echocardiogram, following shortness of breath/palpitations.    Venkata is a 66 year old male with history of paroxysmal atrial fibrillation on Flecainide and Cardizem, anticoagulation with Eliquis, hypertension, hyperlipidemia, and chronic LE edema.    I saw him in February 2022, and he was having some shortness of breath and palpitations, following Covid pneumonia at [facility] in October 2021. I ordered echocardiogram.      He is here today for follow-up. Palpitations are much better, and breathing seems more stable. LE edema is mostly stable; he does take Lasix and Aldactone daily; no recent open/weeping wounds. No chest pain, pressure or discomfort. No orthopnea or PND; some lightheadedness, but no syncope or presyncope. BP has been stable.         Past Medical History:   Diagnosis Date   • Bowel habit changes     Constipation   • CHF (congestive heart failure) (HCC) 12/2017    Echocardiogram with rEF of 45%. January 2019: Echocardiogram with LVEF 60%.   • Chronic anticoagulation    • Disorder of thyroid     As a child only   • Hepatitis C 1980   • Hyperglycemia    • Hyperlipidemia    • Hypertension    • Indigestion    • Obesity    • Paroxysmal atrial fibrillation (HCC) 01/2019     Echocardiogram with normal LV size, LVEF 60%. Grade I diastolic dysfunction. Mildly dilated LA. Mild MR, mild AR. Moderate TR.   • Psychiatric problem    • Renal disorder     KIDNEY FAILURE EARLY PT.   • Urinary bladder disorder     ENLARGED PROSTATE     Past Surgical History:   Procedure Laterality Date   • TRANS URETHRAL RESECTION PROSTATE  12/8/2017    Procedure: TRANS URETHRAL RESECTION PROSTATE;  Surgeon: Jonny Chávez M.D.;  Location: SURGERY [facility];  Service: Urology   • INGUINAL  HERNIA REPAIR CHILD  as child     History reviewed. No pertinent family history.  Social History     Socioeconomic History   • Marital status:      Spouse name: Not on file   • Number of children: Not on file   • Years of education: Not on file   • Highest education level: Not on file   Occupational History   • Not on file   Tobacco Use   • Smoking status: Former Smoker     Packs/day: 0.25     Quit date: 6/6/2018     Years since quitting: 3.8   • Smokeless tobacco: Never Used   Substance and Sexual Activity   • Alcohol use: No   • Drug use: No   • Sexual activity: Not on file   Other Topics Concern   • Not on file   Social History Narrative   • Not on file     Social Determinants of Health     Financial Resource Strain: Not on file   Food Insecurity: Not on file   Transportation Needs: Not on file   Physical Activity: Not on file   Stress: Not on file   Social Connections: Not on file   Intimate Partner Violence: Not on file   Housing Stability: Not on file     No Known Allergies  Outpatient Encounter Medications as of 4/7/2022   Medication Sig Dispense Refill   • atorvastatin (LIPITOR) 20 MG Tab Take 1 Tablet by mouth every day. 100 Tablet 3   • apixaban (ELIQUIS) 5mg Tab Take 1 Tablet by mouth 2 times a day. 200 Tablet 3   • spironolactone (ALDACTONE) 50 MG Tab Take 1 Tablet by mouth every day. 100 Tablet 3   • flecainide (TAMBOCOR) 50 MG tablet Take 1 tablet by mouth twice daily 200 Tablet 3   • furosemide (LASIX) 40 MG Tab Take 1 tablet by mouth once daily 100 Tablet 3   • DILTIAZem CD (CARTIA XT) 120 MG CAPSULE SR 24 HR Take 1 Capsule by mouth every day. 100 Capsule 3   • Glucosamine-Chondroitin (GLUCOSAMINE CHONDR COMPLEX PO) Take  by mouth every day.     • Multiple Vitamins-Minerals (CENTRUM ADULTS PO) Take  by mouth every day.       No facility-administered encounter medications on file as of 4/7/2022.     Review of Systems   Constitutional: Negative for chills, fever and malaise/fatigue.   HENT:  "Negative for congestion.    Respiratory: Negative for cough and shortness of breath.    Cardiovascular: Positive for palpitations and leg swelling. Negative for chest pain, orthopnea and PND.        Stable, treated with Lasix/Aldactone.   Gastrointestinal: Negative for abdominal pain and nausea.   Musculoskeletal: Positive for back pain and joint pain. Negative for myalgias.   Skin: Negative for rash.   Neurological: Negative for dizziness, loss of consciousness and headaches.   Endo/Heme/Allergies: Does not bruise/bleed easily.   Psychiatric/Behavioral: The patient does not have insomnia.               Objective     /62 (BP Location: Left arm, Patient Position: Sitting, BP Cuff Size: Adult)   Pulse 76   Resp 14   Ht 1.727 m (5' 8\")   Wt 109 kg (239 lb 12.8 oz)   SpO2 96%   BMI 36.46 kg/m²     Physical Exam  Constitutional:       Appearance: He is well-developed.      Comments: BMI 36.04 (weight is up slightly)   HENT:      Head: Normocephalic.   Neck:      Vascular: No JVD.   Cardiovascular:      Rate and Rhythm: Normal rate and regular rhythm.      Heart sounds: Normal heart sounds.   Pulmonary:      Effort: Pulmonary effort is normal. No respiratory distress.      Breath sounds: Normal breath sounds. No wheezing or rales.   Abdominal:      General: Bowel sounds are normal. There is no distension.      Palpations: Abdomen is soft.      Tenderness: There is no abdominal tenderness.   Musculoskeletal:         General: Normal range of motion.      Cervical back: Normal range of motion and neck supple.      Comments: 1+ LE edema, R>L, stable from previous.  Skin changes of chronic venous stasis.   Skin:     General: Skin is warm and dry.      Findings: No rash.   Neurological:      Mental Status: He is alert and oriented to person, place, and time.     RESULTS OF ECHOCARDIOGRAM OF 3/31/2022:  Normal LV size, LVEF 60-65%. Mildly enlarged RV. Enlarged RA and LA. Mild AR, mild MR, mild TR. RVSP 40mmHg.      " LABS AS OF 10/20/2021:  WBC 4.6  RBC 5.46  Hgb 15.3  Hct 47.1  Platelets 364  Glucose 112  BUN 28  Creatinine 1.42  GFR 50  Potassium 4.1  AST 36  ALT 36     CT SCAN OF CHEST OF 10/20/2021:  1. Marked distention of the thoracic esophagus with focal area of narrowing seen near the gastroesophageal junction.  2. Central and peripheral bilateral groundglass pulmonary opacities compatible with multifocal pneumonia. Atypical or viral pneumonia including Covid pneumonia not excluded.      Assessment & Plan     1. Paroxysmal atrial fibrillation (HCC)  TSH   2. Chronic anticoagulation     3. Primary hypertension  CBC WITH DIFFERENTIAL   4. Mixed hyperlipidemia  Comp Metabolic Panel    Lipid Profile   5. Localized edema  Comp Metabolic Panel   6. Class 2 severe obesity due to excess calories with serious comorbidity and body mass index (BMI) of 36.0 to 36.9 in adult (HCC)  TSH       Medical Decision Making: Today's Assessment/Status/Plan:      1. Palpitations and shortness of breath, following Covid infection in October 2021, with stable echocardiogram. Reviewed results with patient, and he is reassured.    2. Paroxysmal atrial fibrillation, in sinus rhythm on Flecainide and Diltiazem.    3. Chronic anticoagulation with Eliquis. No bleeding problems.    4. Hypertension, treated with Diltiazem, stable. BP is well controlled.    5. Hyperlipidemia, treated with Lipitor. To obtain fasting lipid panel.    6. LE edema, on Lasix and Aldactone, mostly stable.    7. Obesity, ongoing. Urged to work on lifestyle modifications.    Same medications for now. Labs as above. Follow-up in 6 months.

## 2022-07-06 DIAGNOSIS — I10 PRIMARY HYPERTENSION: ICD-10-CM

## 2022-07-07 ENCOUNTER — TELEPHONE (OUTPATIENT)
Dept: CARDIOLOGY | Facility: MEDICAL CENTER | Age: 67
End: 2022-07-07
Payer: MEDICARE

## 2022-07-07 NOTE — TELEPHONE ENCOUNTER
Called pt [phone]  Not accepting calls at this time.    -------------------------------------    Called pt [phone]  No answer, just rang with no VM. Could not leave message

## 2022-07-07 NOTE — TELEPHONE ENCOUNTER
----- Message from SAMARIA Ayala sent at 7/7/2022 12:02 PM PDT -----  Labs are all OK/stable, except glucose was 144. Suggest getting fasting HgbA1c.  Same meds for now.  Thanks, AB

## 2022-09-10 NOTE — OR NURSING
Pt A&OX4. VSS. Pt in Afib, HR 70-110s. First dose of metoprolol administered per orders. Weaned off oxygen. CBI urine clear pink. Pt states pain is 6/10 with the discomfort in his scrotum mainly. Ice pack in place. Pt has no complaints of nausea. Report called to LEWIS Daniel. Pt off unit on monitor.    Report received from [facility] shift RN, assumed patient care. Patient is calmly resting in bed, no signs of distress, even and unlabored breathing noted. Pt on 1L NC. Tele box on and in place. Plan of care discussed with patient, repeat back understanding obtained. Patient has call light within reach, fall precautions in place. Will continue to monitor.

## 2022-10-13 ENCOUNTER — OFFICE VISIT (OUTPATIENT)
Dept: CARDIOLOGY | Facility: PHYSICIAN GROUP | Age: 67
End: 2022-10-13
Payer: MEDICARE

## 2022-10-13 VITALS
HEIGHT: 68 IN | OXYGEN SATURATION: 94 % | BODY MASS INDEX: 35.16 KG/M2 | DIASTOLIC BLOOD PRESSURE: 72 MMHG | HEART RATE: 78 BPM | RESPIRATION RATE: 18 BRPM | SYSTOLIC BLOOD PRESSURE: 110 MMHG | WEIGHT: 232 LBS

## 2022-10-13 DIAGNOSIS — E78.2 MIXED HYPERLIPIDEMIA: ICD-10-CM

## 2022-10-13 DIAGNOSIS — R60.0 LOCALIZED EDEMA: ICD-10-CM

## 2022-10-13 DIAGNOSIS — I48.0 PAROXYSMAL ATRIAL FIBRILLATION (HCC): ICD-10-CM

## 2022-10-13 DIAGNOSIS — Z79.01 CHRONIC ANTICOAGULATION: ICD-10-CM

## 2022-10-13 DIAGNOSIS — I10 PRIMARY HYPERTENSION: ICD-10-CM

## 2022-10-13 PROCEDURE — 99214 OFFICE O/P EST MOD 30 MIN: CPT | Performed by: NURSE PRACTITIONER

## 2022-10-13 ASSESSMENT — ENCOUNTER SYMPTOMS
COUGH: 0
NAUSEA: 0
ABDOMINAL PAIN: 0
CHILLS: 0
PND: 0
BACK PAIN: 1
ORTHOPNEA: 0
HEADACHES: 0
SHORTNESS OF BREATH: 0
DIZZINESS: 0
LOSS OF CONSCIOUSNESS: 0
FEVER: 0
INSOMNIA: 0
PALPITATIONS: 0
BRUISES/BLEEDS EASILY: 0
MYALGIAS: 0

## 2022-10-13 NOTE — PROGRESS NOTES
Chief Complaint   Patient presents with    Follow-Up    Atrial Fibrillation    Anticoagulation    HTN (Controlled)    Hyperlipidemia    Edema       Subjective     Venkata Felipe David is a 67 y.o. male who presents today for six month follow-up of PAFib, anticoagulation, HTN and hyperlipidemia.    Venkata is a 67 year old male with history of paroxysmal atrial fibrillation on Flecainide and Cardizem, anticoagulation with Eliquis, hypertension, hyperlipidemia, and chronic LE edema.    Echcoardiogram done in March 2022 (after Covid infection in October 2021) was normal, LVEF 60-65%.       He is here today for six month follow-up. Breathing seems to be stable with no orthopnea or PND. LE edema is mostly stable; he does take Lasix and Aldactone daily; no recent open/weeping wounds; wound appear to be stabbing over. No chest pain, pressure or discomfort. No dizziness, lightheadedness, syncope or presyncope. BP has been stable. He feels pretty good today.    Past Medical History:   Diagnosis Date    Bowel habit changes     Constipation    CHF (congestive heart failure) (HCC) 12/2017    Echocardiogram with rEF of 45%. January 2019: Echocardiogram with LVEF 60%.    Chronic anticoagulation     Disorder of thyroid     As a child only    Hepatitis C 1980    Hyperglycemia     Hyperlipidemia     Hypertension     Indigestion     Obesity     Paroxysmal atrial fibrillation (HCC) 03/2022    Echocardiogram with normal LV size, LVEF 60-65%. Mildly enlarged RV. Enlarged RA and LA. Mild AR, mild MR, mild TR. RVSP 40mmHg.    Psychiatric problem     Renal disorder     KIDNEY FAILURE EARLY PT.    Urinary bladder disorder     ENLARGED PROSTATE     Past Surgical History:   Procedure Laterality Date    TRANS URETHRAL RESECTION PROSTATE  12/8/2017    Procedure: TRANS URETHRAL RESECTION PROSTATE;  Surgeon: Jonny Chávez M.D.;  Location: SURGERY [facility];  Service: Urology    INGUINAL HERNIA REPAIR CHILD  as child     History reviewed. No  pertinent family history.  Social History     Socioeconomic History    Marital status:      Spouse name: Not on file    Number of children: Not on file    Years of education: Not on file    Highest education level: Not on file   Occupational History    Not on file   Tobacco Use    Smoking status: Former     Packs/day: 0.25     Types: Cigarettes     Quit date: 2018     Years since quittin.3    Smokeless tobacco: Never   Substance and Sexual Activity    Alcohol use: No    Drug use: No    Sexual activity: Not on file   Other Topics Concern    Not on file   Social History Narrative    Not on file     Social Determinants of Health     Financial Resource Strain: Not on file   Food Insecurity: Not on file   Transportation Needs: Not on file   Physical Activity: Not on file   Stress: Not on file   Social Connections: Not on file   Intimate Partner Violence: Not on file   Housing Stability: Not on file     No Known Allergies  Outpatient Encounter Medications as of 10/13/2022   Medication Sig Dispense Refill    atorvastatin (LIPITOR) 20 MG Tab Take 1 Tablet by mouth every day. 100 Tablet 3    apixaban (ELIQUIS) 5mg Tab Take 1 Tablet by mouth 2 times a day. 200 Tablet 3    spironolactone (ALDACTONE) 50 MG Tab Take 1 Tablet by mouth every day. 100 Tablet 3    flecainide (TAMBOCOR) 50 MG tablet Take 1 tablet by mouth twice daily 200 Tablet 3    furosemide (LASIX) 40 MG Tab Take 1 tablet by mouth once daily 100 Tablet 3    DILTIAZem CD (CARTIA XT) 120 MG CAPSULE SR 24 HR Take 1 Capsule by mouth every day. 100 Capsule 3    Glucosamine-Chondroitin (GLUCOSAMINE CHONDR COMPLEX PO) Take  by mouth every day.      Multiple Vitamins-Minerals (CENTRUM ADULTS PO) Take  by mouth every day.       No facility-administered encounter medications on file as of 10/13/2022.     Review of Systems   Constitutional:  Negative for chills, fever and malaise/fatigue.   HENT:  Negative for congestion.    Respiratory:  Negative for cough  "and shortness of breath.    Cardiovascular:  Positive for leg swelling. Negative for chest pain, palpitations, orthopnea and PND.        Stable, treated with Lasix/Aldactone.   Gastrointestinal:  Negative for abdominal pain and nausea.   Musculoskeletal:  Positive for back pain and joint pain. Negative for myalgias.   Skin:  Negative for rash.   Neurological:  Negative for dizziness, loss of consciousness and headaches.   Endo/Heme/Allergies:  Does not bruise/bleed easily.   Psychiatric/Behavioral:  The patient does not have insomnia.             Objective     /72 (BP Location: Left arm, Patient Position: Sitting, BP Cuff Size: Adult)   Pulse 78   Resp 18   Ht 1.727 m (5' 8\")   Wt 105 kg (232 lb)   SpO2 94%   BMI 35.28 kg/m²     Physical Exam  Constitutional:       Appearance: He is well-developed.      Comments: BMI 35.28 (weight is down slightly)   HENT:      Head: Normocephalic.   Neck:      Vascular: No JVD.   Cardiovascular:      Rate and Rhythm: Normal rate and regular rhythm.      Heart sounds: Normal heart sounds.   Pulmonary:      Effort: Pulmonary effort is normal. No respiratory distress.      Breath sounds: Normal breath sounds. No wheezing or rales.   Abdominal:      General: Bowel sounds are normal. There is no distension.      Palpations: Abdomen is soft.      Tenderness: There is no abdominal tenderness.   Musculoskeletal:         General: Normal range of motion.      Cervical back: Normal range of motion and neck supple.      Comments: 1+ LE edema, R>L, stable from previous.  Skin changes of chronic venous stasis.   Skin:     General: Skin is warm and dry.      Findings: No rash.   Neurological:      Mental Status: He is alert and oriented to person, place, and time.     Echocardiogram of March 2022:  Normal LV size, LVEF 60-65%. Mildly enlarged RV. Enlarged RA and LA. Mild AR, mild MR, mild TR. RVSP 40mmHg.     Labs as of 7/6/2022:  WBC 5.0  RBC 5.13  Hgb 14.8  Hct 44.7  Platelets " 267  Glucose 144  BUN 20  Creatinine 1.18  GFR 67  Potassium 4.0  AST 15  ALT 20  Cholesterol 147  Triglycerides 92  HDL 37  LDL 90  Cholesterol/HDL ration 4.0  TSH 2.39    Assessment & Plan     1. Paroxysmal atrial fibrillation (HCC)        2. Chronic anticoagulation        3. Primary hypertension        4. Mixed hyperlipidemia        5. Localized edema            Medical Decision Making: Today's Assessment/Status/Plan:      1. Paroxysmal atrial fibrillation, in sinus rhytm on Flecainide and Diltiazem. EKG in April 2022 was stable.    2. Chronic anticoagulation with Eliquis, no bleeding problems. He plans to complete patient assistance paperwork.    3. Hypertension, treated with Diltiazem, stable.    4. Hyperlipidemia, treated with Lipitor 20mg.    5. LE edema, ongoing/slowly improving, on Lasix and Aldactone.    6. Hyperglycemia, urged follow-up with PCP to address.    Echocardiogram in March 2022 was normal/stable; labs from July 2022 are also stable, except glucose. Same medications and follow-up in 6 months.

## 2022-10-14 ENCOUNTER — TELEPHONE (OUTPATIENT)
Dept: CARDIOLOGY | Facility: MEDICAL CENTER | Age: 67
End: 2022-10-14
Payer: MEDICARE

## 2023-04-13 ENCOUNTER — OFFICE VISIT (OUTPATIENT)
Dept: CARDIOLOGY | Facility: PHYSICIAN GROUP | Age: 68
End: 2023-04-13
Payer: MEDICARE

## 2023-04-13 VITALS
HEART RATE: 71 BPM | OXYGEN SATURATION: 95 % | HEIGHT: 68 IN | WEIGHT: 241 LBS | DIASTOLIC BLOOD PRESSURE: 62 MMHG | RESPIRATION RATE: 18 BRPM | SYSTOLIC BLOOD PRESSURE: 112 MMHG | BODY MASS INDEX: 36.53 KG/M2

## 2023-04-13 DIAGNOSIS — I48.0 PAROXYSMAL ATRIAL FIBRILLATION (HCC): ICD-10-CM

## 2023-04-13 DIAGNOSIS — E66.01 CLASS 2 SEVERE OBESITY DUE TO EXCESS CALORIES WITH SERIOUS COMORBIDITY AND BODY MASS INDEX (BMI) OF 36.0 TO 36.9 IN ADULT (HCC): ICD-10-CM

## 2023-04-13 DIAGNOSIS — I10 PRIMARY HYPERTENSION: ICD-10-CM

## 2023-04-13 DIAGNOSIS — E78.2 MIXED HYPERLIPIDEMIA: ICD-10-CM

## 2023-04-13 DIAGNOSIS — Z79.01 CHRONIC ANTICOAGULATION: ICD-10-CM

## 2023-04-13 PROCEDURE — 99214 OFFICE O/P EST MOD 30 MIN: CPT | Performed by: NURSE PRACTITIONER

## 2023-04-13 RX ORDER — ASPIRIN 81 MG/1
81 TABLET, CHEWABLE ORAL DAILY
Qty: 100 TABLET | Refills: 3 | Status: SHIPPED | OUTPATIENT
Start: 2023-04-13

## 2023-04-13 ASSESSMENT — ENCOUNTER SYMPTOMS
MYALGIAS: 0
INSOMNIA: 0
PND: 0
BRUISES/BLEEDS EASILY: 0
COUGH: 0
BACK PAIN: 1
DIZZINESS: 0
HEADACHES: 0
FEVER: 0
PALPITATIONS: 0
CHILLS: 0
NAUSEA: 0
LOSS OF CONSCIOUSNESS: 0
SHORTNESS OF BREATH: 0
ORTHOPNEA: 0
ABDOMINAL PAIN: 0

## 2023-04-13 NOTE — PROGRESS NOTES
Chief Complaint   Patient presents with    Follow-Up    Atrial Fibrillation    Anticoagulation    HTN (Controlled)    Hyperlipidemia       Subjective     Venkata David is a 67 y.o. male who presents today for six month follow-up of PAFib, anticoagulation, HTN, and hyperlipidemia.    Venkata is a 67 year old male with history of paroxysmal atrial fibrillation on Flecainide and Cardizem, previous anticoagulation with Eliquis, hypertension, hyperlipidemia, and chronic LE edema. Echcoardiogram done in March 2022 (after Covid infection in October 2021) was normal, LVEF 60-65%.    Since the last visit in October 2022, he has stopped Eliquis, and he does not want to complete the patient assistance paperwork, and he cannot afford out-of-pocket costs.    He does note some increased palpitations. Breathing seems to be stable with no orthopnea or PND. LE edema is mostly stable; he does take Lasix and Aldactone daily; he denies any chest pain, pressure or discomfort. No dizziness, lightheadedness, syncope or presyncope. BP has been stable.     Past Medical History:   Diagnosis Date    Bowel habit changes     Constipation    CHF (congestive heart failure) (HCC) 12/2017    Echocardiogram with rEF of 45%. January 2019: Echocardiogram with LVEF 60%.    Chronic anticoagulation     Disorder of thyroid     As a child only    Hepatitis C 1980    Hyperglycemia     Hyperlipidemia     Hypertension     Indigestion     Obesity     Paroxysmal atrial fibrillation (HCC) 03/2022    Echocardiogram with normal LV size, LVEF 60-65%. Mildly enlarged RV. Enlarged RA and LA. Mild AR, mild MR, mild TR. RVSP 40mmHg.    Psychiatric problem     Renal disorder     KIDNEY FAILURE EARLY PT.    Urinary bladder disorder     ENLARGED PROSTATE     Past Surgical History:   Procedure Laterality Date    TRANS URETHRAL RESECTION PROSTATE  12/8/2017    Procedure: TRANS URETHRAL RESECTION PROSTATE;  Surgeon: Jonny Chávez M.D.;  Location: SURGERY [facility];  Service: Urology    INGUINAL HERNIA REPAIR CHILD  as child     History reviewed. No pertinent family history.  Social History     Socioeconomic History    Marital status:      Spouse name: Not on file    Number of children: Not on file    Years of education: Not on file    Highest education level: Not on file   Occupational History    Not on file   Tobacco Use    Smoking status: Former     Packs/day: 0.25     Types: Cigarettes     Quit date: 2018     Years since quittin.8    Smokeless tobacco: Never   Vaping Use    Vaping Use: Never used   Substance and Sexual Activity    Alcohol use: No    Drug use: No    Sexual activity: Not on file   Other Topics Concern    Not on file   Social History Narrative    Not on file     Social Determinants of Health     Financial Resource Strain: Not on file   Food Insecurity: Not on file   Transportation Needs: Not on file   Physical Activity: Not on file   Stress: Not on file   Social Connections: Not on file   Intimate Partner Violence: Not on file   Housing Stability: Not on file     No Known Allergies  Outpatient Encounter Medications as of 2023   Medication Sig Dispense Refill    aspirin (ASA) 81 MG Chew Tab chewable tablet Chew 1 Tablet every day. 100 Tablet 3    flecainide (TAMBOCOR) 50 MG tablet Take 1 tablet by mouth twice daily 200 Tablet 0    atorvastatin (LIPITOR) 20 MG Tab Take 1 tablet by mouth once daily 90 Tablet 0    DILTIAZem CD (CARDIZEM CD) 120 MG CAPSULE SR 24 HR TAKE 1 CAPSULE BY MOUTH ONCE DAILY CAN  TAKE  1  ADDITIONAL  CAPSULE  AS  NEEDED  FOR  SUSTAINDED  PALPITATIONS 100 Capsule 0    spironolactone (ALDACTONE) 50 MG Tab Take 1 Tablet by mouth every day. 100 Tablet 3    furosemide (LASIX) 40 MG Tab Take 1 tablet by mouth once daily 100 Tablet 3    Glucosamine-Chondroitin (GLUCOSAMINE CHONDR COMPLEX PO) Take  by mouth every day.      Multiple Vitamins-Minerals (CENTRUM ADULTS PO) Take  by mouth every day.      apixaban (ELIQUIS) 5mg  "Tab Take 1 Tablet by mouth 2 times a day. (Patient not taking: Reported on 4/13/2023) 200 Tablet 3     No facility-administered encounter medications on file as of 4/13/2023.     Review of Systems   Constitutional:  Negative for chills, fever and malaise/fatigue.   HENT:  Negative for congestion.    Respiratory:  Negative for cough and shortness of breath.    Cardiovascular:  Positive for leg swelling. Negative for chest pain, palpitations, orthopnea and PND.        Stable, treated with Lasix/Aldactone.   Gastrointestinal:  Negative for abdominal pain and nausea.   Musculoskeletal:  Positive for back pain and joint pain. Negative for myalgias.   Skin:  Negative for rash.   Neurological:  Negative for dizziness, loss of consciousness and headaches.   Endo/Heme/Allergies:  Does not bruise/bleed easily.   Psychiatric/Behavioral:  The patient does not have insomnia.             Objective     /62 (BP Location: Left arm, Patient Position: Sitting, BP Cuff Size: Adult)   Pulse 71   Resp 18   Ht 1.727 m (5' 8\")   Wt 109 kg (241 lb)   SpO2 95%   BMI 36.64 kg/m²     Physical Exam  Constitutional:       Appearance: He is well-developed.      Comments: BMI 36.64   HENT:      Head: Normocephalic.   Neck:      Vascular: No JVD.   Cardiovascular:      Rate and Rhythm: Normal rate and regular rhythm.      Heart sounds: Normal heart sounds.   Pulmonary:      Effort: Pulmonary effort is normal. No respiratory distress.      Breath sounds: Normal breath sounds. No wheezing or rales.   Abdominal:      General: Bowel sounds are normal. There is no distension.      Palpations: Abdomen is soft.      Tenderness: There is no abdominal tenderness.   Musculoskeletal:         General: Normal range of motion.      Cervical back: Normal range of motion and neck supple.      Comments: 1+ LE edema, R>L, stable from previous.  Skin changes of chronic venous stasis.   Skin:     General: Skin is warm and dry.      Findings: No rash. "   Neurological:      Mental Status: He is alert and oriented to person, place, and time.     EKG ordered and interpreted by me today reveals sinus rhythm at 62bpm with RBBB. QTc 444ms.    Echocardiogram of March 2022:  Normal LV size, LVEF 60-65%. Mildly enlarged RV. Enlarged RA and LA. Mild AR, mild MR, mild TR. RVSP 40mmHg.    CONCLUSIONS OF TTE OF 12/9/2017:  No prior study is available for comparison.   Mildly reduced left ventricular systolic function.  Left ventricular ejection fraction is visually estimated to be 45%.  Mild mitral regurgitation.  Moderate tricuspid regurgitation.  Estimated right ventricular systolic pressure is 30 mmHg.    NUCLEAR IMAGING INTERPRETATION OF MPI FO 12/19/2019:  Atrial fibrillation with right bundle branch block and rapid ventricular   response throughout test (resting heart rate 176 bpm).   No evidence of significant jeopardized viable myocardium or prior myocardial infarction.   Global hypokinesis. LV ejection fraction = 34%.      Labs as of 7/6/2022:  WBC 5.0  RBC 5.13  Hgb 14.8  Hct 44.7  Platelets 267  Glucose 144  BUN 20  Creatinine 1.18  GFR 67  Potassium 4.0  AST 15  ALT 20  Cholesterol 147  Triglycerides 92  HDL 37  LDL 90  Cholesterol/HDL ration 4.0  TSH 2.39    Assessment & Plan     1. Paroxysmal atrial fibrillation (HCC)  Holter Monitor Study    TSH    aspirin (ASA) 81 MG Chew Tab chewable tablet    EKG      2. Chronic anticoagulation  aspirin (ASA) 81 MG Chew Tab chewable tablet      3. Primary hypertension  CBC WITHOUT DIFFERENTIAL      4. Mixed hyperlipidemia  Comp Metabolic Panel    Lipid Profile      5. Class 2 severe obesity due to excess calories with serious comorbidity and body mass index (BMI) of 36.0 to 36.9 in adult ([facility])            Medical Decision Making: Today's Assessment/Status/Plan:      1. Paroxysmal atrial fibrillation, in sinus rhythm today. To obtain ZioPatch to assess for AFib burden. He remains on Flecainide and Diltiazem.    2. Previously on  Eliquis for anticoagulation. He is [id number] score 3 (age, HTN, CHF), and we discussed other options including Coumadin. He does not want to get regular INRs; he is agreeable to ASA 81mg once daily. He understands the risk of not being fully anticoagulated.    3. Hypertension, treated with Diltiazem and Lasix/Aldactone, stable.    4. Hyperlipidemia, treated with Lipitor 20mg. To obtain fasting CMP and lipid panel.    5. Obesity, BMI 36.64.    6. Chronic LE edema, with previous skin wounds, stable on Lasix and Aldactone. To obtain CMP to assess K+ and renal function.    Same medications for now. ZioPatch applied today; we will make any changes/medication adjustments based on these results. Start Aspirin 81mg once daily. Follow-up in 6 months.

## 2023-04-19 DIAGNOSIS — I10 PRIMARY HYPERTENSION: ICD-10-CM

## 2023-04-19 DIAGNOSIS — E78.2 MIXED HYPERLIPIDEMIA: ICD-10-CM

## 2023-04-21 ENCOUNTER — TELEPHONE (OUTPATIENT)
Dept: HEALTH INFORMATION MANAGEMENT | Facility: OTHER | Age: 68
End: 2023-04-21
Payer: MEDICARE

## 2023-06-29 DIAGNOSIS — R60.0 LOCALIZED EDEMA: ICD-10-CM

## 2023-06-29 DIAGNOSIS — E78.2 MIXED HYPERLIPIDEMIA: ICD-10-CM

## 2023-06-30 RX ORDER — ATORVASTATIN CALCIUM 20 MG/1
TABLET, FILM COATED ORAL
Qty: 90 TABLET | Refills: 2 | Status: SHIPPED | OUTPATIENT
Start: 2023-06-30 | End: 2023-10-26 | Stop reason: SDUPTHER

## 2023-06-30 RX ORDER — SPIRONOLACTONE 50 MG/1
TABLET, FILM COATED ORAL
Qty: 90 TABLET | Refills: 2 | Status: SHIPPED | OUTPATIENT
Start: 2023-06-30 | End: 2023-10-26 | Stop reason: SDUPTHER

## 2023-06-30 RX ORDER — FUROSEMIDE 40 MG/1
TABLET ORAL
Qty: 90 TABLET | Refills: 2 | Status: SHIPPED | OUTPATIENT
Start: 2023-06-30 | End: 2023-10-26 | Stop reason: SDUPTHER

## 2023-06-30 NOTE — TELEPHONE ENCOUNTER
Last OV AB 4/13/23     Medical Decision Making: Today's Assessment/Status/Plan:            1. Paroxysmal atrial fibrillation, in sinus rhythm today. To obtain ZioPatch to assess for AFib burden. He remains on Flecainide and Diltiazem.     2. Previously on Eliquis for anticoagulation. He is [id number] score 3 (age, HTN, CHF), and we discussed other options including Coumadin. He does not want to get regular INRs; he is agreeable to ASA 81mg once daily. He understands the risk of not being fully anticoagulated.     3. Hypertension, treated with Diltiazem and Lasix/Aldactone, stable.     4. Hyperlipidemia, treated with Lipitor 20mg. To obtain fasting CMP and lipid panel.     5. Obesity, BMI 36.64.     6. Chronic LE edema, with previous skin wounds, stable on Lasix and Aldactone. To obtain CMP to assess K+ and renal function.

## 2023-07-09 DIAGNOSIS — I48.0 PAROXYSMAL ATRIAL FIBRILLATION (HCC): ICD-10-CM

## 2023-07-10 RX ORDER — DILTIAZEM HYDROCHLORIDE 120 MG/1
CAPSULE, COATED, EXTENDED RELEASE ORAL
Qty: 100 CAPSULE | Refills: 2 | Status: SHIPPED | OUTPATIENT
Start: 2023-07-10 | End: 2023-10-26 | Stop reason: SDUPTHER

## 2023-07-10 NOTE — TELEPHONE ENCOUNTER
Is the patient due for a refill? Yes    Was the patient seen the past year? Yes    Date of last office visit: 4/13/23    Does the patient have an upcoming appointment?  Yes   If yes, When? 10/26/23    Provider to refill:AB    Does the patients insurance require a 100 day supply?  No

## 2023-10-02 DIAGNOSIS — I48.0 PAROXYSMAL ATRIAL FIBRILLATION (HCC): ICD-10-CM

## 2023-10-03 RX ORDER — FLECAINIDE ACETATE 50 MG/1
TABLET ORAL
Qty: 60 TABLET | Refills: 0 | Status: SHIPPED | OUTPATIENT
Start: 2023-10-03 | End: 2023-10-26 | Stop reason: SDUPTHER

## 2023-10-16 ENCOUNTER — TELEPHONE (OUTPATIENT)
Dept: CARDIOLOGY | Facility: MEDICAL CENTER | Age: 68
End: 2023-10-16
Payer: MEDICARE

## 2023-10-16 NOTE — TELEPHONE ENCOUNTER
Called to inquire about cardiac event monitor that was placed on 4/13/2023 as results are unavailable. Patients mailbox is full so I am unable to lvm for pt.

## 2023-10-26 ENCOUNTER — OFFICE VISIT (OUTPATIENT)
Dept: CARDIOLOGY | Facility: PHYSICIAN GROUP | Age: 68
End: 2023-10-26
Payer: MEDICARE

## 2023-10-26 VITALS
DIASTOLIC BLOOD PRESSURE: 60 MMHG | HEIGHT: 68 IN | SYSTOLIC BLOOD PRESSURE: 118 MMHG | HEART RATE: 67 BPM | OXYGEN SATURATION: 94 % | WEIGHT: 231 LBS | BODY MASS INDEX: 35.01 KG/M2 | RESPIRATION RATE: 12 BRPM

## 2023-10-26 DIAGNOSIS — E78.2 MIXED HYPERLIPIDEMIA: ICD-10-CM

## 2023-10-26 DIAGNOSIS — Z79.01 CHRONIC ANTICOAGULATION: ICD-10-CM

## 2023-10-26 DIAGNOSIS — R73.9 HYPERGLYCEMIA: ICD-10-CM

## 2023-10-26 DIAGNOSIS — I48.0 PAROXYSMAL ATRIAL FIBRILLATION (HCC): ICD-10-CM

## 2023-10-26 DIAGNOSIS — I10 PRIMARY HYPERTENSION: ICD-10-CM

## 2023-10-26 DIAGNOSIS — R60.0 LOCALIZED EDEMA: ICD-10-CM

## 2023-10-26 PROCEDURE — 3074F SYST BP LT 130 MM HG: CPT | Performed by: NURSE PRACTITIONER

## 2023-10-26 PROCEDURE — 99214 OFFICE O/P EST MOD 30 MIN: CPT | Performed by: NURSE PRACTITIONER

## 2023-10-26 PROCEDURE — 3078F DIAST BP <80 MM HG: CPT | Performed by: NURSE PRACTITIONER

## 2023-10-26 RX ORDER — ATORVASTATIN CALCIUM 20 MG/1
20 TABLET, FILM COATED ORAL DAILY
Qty: 100 TABLET | Refills: 3 | Status: SHIPPED | OUTPATIENT
Start: 2023-10-26 | End: 2024-11-29

## 2023-10-26 RX ORDER — FUROSEMIDE 40 MG/1
40 TABLET ORAL DAILY
Qty: 100 TABLET | Refills: 3 | Status: SHIPPED | OUTPATIENT
Start: 2023-10-26

## 2023-10-26 RX ORDER — FLECAINIDE ACETATE 50 MG/1
50 TABLET ORAL 2 TIMES DAILY
Qty: 200 TABLET | Refills: 3 | Status: SHIPPED | OUTPATIENT
Start: 2023-10-26

## 2023-10-26 RX ORDER — DILTIAZEM HYDROCHLORIDE 120 MG/1
120 CAPSULE, COATED, EXTENDED RELEASE ORAL DAILY
Qty: 100 CAPSULE | Refills: 3 | Status: SHIPPED | OUTPATIENT
Start: 2023-10-26

## 2023-10-26 RX ORDER — SPIRONOLACTONE 50 MG/1
50 TABLET, FILM COATED ORAL DAILY
Qty: 100 TABLET | Refills: 3 | Status: SHIPPED | OUTPATIENT
Start: 2023-10-26

## 2023-10-26 ASSESSMENT — ENCOUNTER SYMPTOMS
FEVER: 0
COUGH: 0
MYALGIAS: 0
ORTHOPNEA: 0
LOSS OF CONSCIOUSNESS: 0
PALPITATIONS: 0
HEADACHES: 0
INSOMNIA: 0
CHILLS: 0
DIZZINESS: 0
PND: 0
SHORTNESS OF BREATH: 0
BACK PAIN: 1
NAUSEA: 0
ABDOMINAL PAIN: 0
BRUISES/BLEEDS EASILY: 0

## 2023-10-26 NOTE — PROGRESS NOTES
Chief Complaint   Patient presents with    Follow-Up    Atrial Fibrillation    Anticoagulation    HTN (Controlled)    Hyperlipidemia    Hyperglycemia       Subjective     Venkata David is a 68 y.o. male who presents today for six month follow-up of PAFib, anticoagulation, HTN, hyperlipidemia, and chronic LE edema.    Venkata is a 68 year old male with history of paroxysmal atrial fibrillation on Flecainide and Cardizem, previous anticoagulation with Eliquis, hypertension, hyperlipidemia, and chronic LE edema. Echcoardiogram done in April 2023 was normal, LVEF 60-65%.    At last follow-up in April 2023, he had stopped Eliquis, due to cost. He declined Coumadin, but did agree to ASA only. ZioPatch was applied, but fell off after 4 days; he never sent it back it (but still has it at home).     He is here today for six month follow-up. Generally, he is stable, and feels fine. He does not fatigue in the mornings. LE edema is mostly stable; he does take Lasix and Aldactone daily; he denies any chest pain, pressure, tightness or discomfort. No palpitations or racing of his heart; no dizziness, lightheadedness, syncope or presyncope. BP has been stable. He is compliant with his medications.    Past Medical History:   Diagnosis Date    Bowel habit changes     Constipation    CHF (congestive heart failure) (HCC) 12/2017    Echocardiogram with rEF of 45%. April 2023: Echocardiogram with LVEF 60-65%.    Chronic anticoagulation     Disorder of thyroid     As a child only    Hepatitis C 1980    Hyperglycemia     Hyperlipidemia     Hypertension     Indigestion     Obesity     Paroxysmal atrial fibrillation (HCC) 04/2023    Echocardiogram with normal LV size, LVEF 60-65%. Normal RA and RV, moderately enlarged LA. Mild AR, mild MR, mild TR. RVSP 45mmHg.    Psychiatric problem     Renal disorder     KIDNEY FAILURE EARLY PT.    Urinary bladder disorder     ENLARGED PROSTATE     Past Surgical History:   Procedure Laterality Date     TRANS URETHRAL RESECTION PROSTATE  2017    Procedure: TRANS URETHRAL RESECTION PROSTATE;  Surgeon: Jonny Chávez M.D.;  Location: SURGERY [facility];  Service: Urology    INGUINAL HERNIA REPAIR CHILD  as child     History reviewed. No pertinent family history.  Social History     Socioeconomic History    Marital status:      Spouse name: Not on file    Number of children: Not on file    Years of education: Not on file    Highest education level: Not on file   Occupational History    Not on file   Tobacco Use    Smoking status: Former     Current packs/day: 0.00     Types: Cigarettes     Quit date: 2018     Years since quittin.3    Smokeless tobacco: Never   Vaping Use    Vaping Use: Never used   Substance and Sexual Activity    Alcohol use: No    Drug use: No    Sexual activity: Not on file   Other Topics Concern    Not on file   Social History Narrative    Not on file     Social Determinants of Health     Financial Resource Strain: Not on file   Food Insecurity: Not on file   Transportation Needs: Not on file   Physical Activity: Not on file   Stress: Not on file   Social Connections: Not on file   Intimate Partner Violence: Not on file   Housing Stability: Not on file     No Known Allergies  Outpatient Encounter Medications as of 10/26/2023   Medication Sig Dispense Refill    atorvastatin (LIPITOR) 20 MG Tab Take 1 Tablet by mouth every day. 100 Tablet 3    DILTIAZem CD (CARDIZEM CD) 120 MG CAPSULE SR 24 HR Take 1 Capsule by mouth every day. 100 Capsule 3    flecainide (TAMBOCOR) 50 MG tablet Take 1 Tablet by mouth 2 times a day. 200 Tablet 3    furosemide (LASIX) 40 MG Tab Take 1 Tablet by mouth every day. 100 Tablet 3    spironolactone (ALDACTONE) 50 MG Tab Take 1 Tablet by mouth every day. 100 Tablet 3    aspirin (ASA) 81 MG Chew Tab chewable tablet Chew 1 Tablet every day. 100 Tablet 3    Glucosamine-Chondroitin (GLUCOSAMINE CHONDR COMPLEX PO) Take  by mouth every day.      Multiple  "Vitamins-Minerals (CENTRUM ADULTS PO) Take  by mouth every day.      [DISCONTINUED] flecainide (TAMBOCOR) 50 MG tablet Take 1 tablet by mouth twice daily 60 Tablet 0    [DISCONTINUED] DILTIAZem CD (CARDIZEM CD) 120 MG CAPSULE SR 24 HR TAKE 1 CAPSULE BY MOUTH ONCE DAILY CAN  TAKE  1  ADDITIONAL  CAPSULE  AS  NEEDED  FOR  SUSTAINED  PALPITATIONS 100 Capsule 2    [DISCONTINUED] spironolactone (ALDACTONE) 50 MG Tab Take 1 tablet by mouth once daily 90 Tablet 2    [DISCONTINUED] furosemide (LASIX) 40 MG Tab Take 1 tablet by mouth once daily 90 Tablet 2    [DISCONTINUED] atorvastatin (LIPITOR) 20 MG Tab Take 1 tablet by mouth once daily 90 Tablet 2     No facility-administered encounter medications on file as of 10/26/2023.     Review of Systems   Constitutional:  Positive for malaise/fatigue. Negative for chills and fever.   HENT:  Negative for congestion.    Respiratory:  Negative for cough and shortness of breath.    Cardiovascular:  Positive for leg swelling. Negative for chest pain, palpitations, orthopnea and PND.        Stable, treated with Lasix/Aldactone.   Gastrointestinal:  Negative for abdominal pain and nausea.   Musculoskeletal:  Positive for back pain and joint pain. Negative for myalgias.   Skin:  Negative for rash.   Neurological:  Negative for dizziness, loss of consciousness and headaches.   Endo/Heme/Allergies:  Does not bruise/bleed easily.   Psychiatric/Behavioral:  The patient does not have insomnia.               Objective     /60 (BP Location: Left arm, Patient Position: Sitting, BP Cuff Size: Adult)   Pulse 67   Resp 12   Ht 1.727 m (5' 8\")   Wt 105 kg (231 lb)   SpO2 94%   BMI 35.12 kg/m²     Physical Exam  Constitutional:       Appearance: He is well-developed.      Comments: BMI 35.12 (weight is down slightly)   HENT:      Head: Normocephalic.   Neck:      Vascular: No JVD.   Cardiovascular:      Rate and Rhythm: Normal rate and regular rhythm.      Heart sounds: Normal heart " sounds.   Pulmonary:      Effort: Pulmonary effort is normal. No respiratory distress.      Breath sounds: Normal breath sounds. No wheezing or rales.   Abdominal:      General: Bowel sounds are normal. There is no distension.      Palpations: Abdomen is soft.      Tenderness: There is no abdominal tenderness.   Musculoskeletal:         General: Normal range of motion.      Cervical back: Normal range of motion and neck supple.      Comments: 1+ LE edema, R>L, stable from previous.  Skin changes of chronic venous stasis.   Skin:     General: Skin is warm and dry.      Findings: No rash.   Neurological:      Mental Status: He is alert and oriented to person, place, and time.       EKG of April 2023 reveals sinus rhythm at 62bpm with RBBB. QTc 444ms.    Echocardiogram of April 2023:  Normal LV size, LVEF 60-65%. Normal RA and RV. Moderately enlarged LA. Mild AR, mild MR, mild TR. RVSP 45mmHg.     Echocardiogram of March 2022:  Normal LV size, LVEF 60-65%. Mildly enlarged RV. Enlarged RA and LA. Mild AR, mild MR, mild TR. RVSP 40mmHg.     CONCLUSIONS OF TTE OF 12/9/2017:  No prior study is available for comparison.   Mildly reduced left ventricular systolic function.  Left ventricular ejection fraction is visually estimated to be 45%.  Mild mitral regurgitation.  Moderate tricuspid regurgitation.  Estimated right ventricular systolic pressure is 30 mmHg.     NUCLEAR IMAGING INTERPRETATION OF MPI FO 12/19/2019:  Atrial fibrillation with right bundle branch block and rapid ventricular   response throughout test (resting heart rate 176 bpm).   No evidence of significant jeopardized viable myocardium or prior myocardial infarction.   Global hypokinesis. LV ejection fraction = 34%.      LABS AS OF 4/14/2023:  WBC 7.5  RBC 5.26  Hgb 15.0  Hct 46.2  Platelets 405  Glucose 138  BUN 22  Creatinine 1.29  GFR 60  Potassium 4.1  AST 16  ALT 17  TSH 4.17  Cholesterol 140  Triglycerides 116  HDL 33  LDL 86  Cholesterol/HDL ratio  4.0    Assessment & Plan     1. Paroxysmal atrial fibrillation (HCC)  DILTIAZem CD (CARDIZEM CD) 120 MG CAPSULE SR 24 HR    flecainide (TAMBOCOR) 50 MG tablet      2. Chronic anticoagulation        3. Localized edema  furosemide (LASIX) 40 MG Tab    spironolactone (ALDACTONE) 50 MG Tab      4. Primary hypertension        5. Mixed hyperlipidemia  atorvastatin (LIPITOR) 20 MG Tab      6. Hyperglycemia  Basic Metabolic Panel    HEMOGLOBIN A1C          Medical Decision Making: Today's Assessment/Status/Plan:      1. Paroxysmal atrial fibrillation, in sinus rhythm today. He remains on Flecainide and Diltiazem. He is asked to send in 4-5 day LensX Lasers, so we can get this data.     2. Previously on Eliquis for anticoagulation. He is [id number] score 3 (age, HTN, CHF), and he declines Coumadin. He understands his risk. He does agree to ASA 18mg onced daily.    3. LE edema, on Lasix and Aldactone, stable. Weight is actually down slightly.     4. Hypertension, treated with Diltiazem and Lasix/Aldactone, stable.     5. Hyperlipidemia, treated with Lipitor 20mg. Last lipid panel was stable.    6. Hyperglycemia, to check fasting BMP and HgbA1c. He needs follow-up with PCP too.     We reviewed Echocardiogram and labs results. He is asked to mail in LensX Lasers so we can get results.  Same medications for now; labs as above.  Follow-up in 6 months, sooner if clinical condition changes.

## 2023-11-03 DIAGNOSIS — R73.9 HYPERGLYCEMIA: ICD-10-CM

## 2023-11-08 ENCOUNTER — TELEPHONE (OUTPATIENT)
Dept: CARDIOLOGY | Facility: MEDICAL CENTER | Age: 68
End: 2023-11-08
Payer: MEDICARE

## 2023-11-09 NOTE — TELEPHONE ENCOUNTER
attempt to call pt, unable to reach.  Unable to leave voicemail as voicemail is full.    Upon chart review, pt active on [facility] last login 11/6/2023.  Noted pt reviewed AB's [facility] message, see pt message encounter 11/6/2023.    [employer] message sent to pt regarding AB's recommendations.  Awaiting pt response.

## 2023-11-09 NOTE — TELEPHONE ENCOUNTER
----- Message from SAMARIA Ayala sent at 11/6/2023  8:50 AM PST -----  I sent him a [employer] message. Can you please call him and just reinforce need for him to follow-up with his PCP for DM medication initiation?  Thanks, AB

## 2023-11-14 ENCOUNTER — TELEPHONE (OUTPATIENT)
Dept: CARDIOLOGY | Facility: MEDICAL CENTER | Age: 68
End: 2023-11-14
Payer: MEDICARE

## 2023-11-14 NOTE — TELEPHONE ENCOUNTER
Phone Number Called: [phone] (mail box full)/[phone]    Call outcome:  Did not leave detailed message, asked patient to call back    Message: Called to provide Ziopatch results & recommendations per AB

## 2024-05-02 ENCOUNTER — APPOINTMENT (OUTPATIENT)
Dept: CARDIOLOGY | Facility: PHYSICIAN GROUP | Age: 69
End: 2024-05-02
Payer: MEDICARE

## 2024-05-02 VITALS
SYSTOLIC BLOOD PRESSURE: 119 MMHG | OXYGEN SATURATION: 91 % | RESPIRATION RATE: 16 BRPM | HEIGHT: 68 IN | BODY MASS INDEX: 33.74 KG/M2 | HEART RATE: 76 BPM | DIASTOLIC BLOOD PRESSURE: 66 MMHG | WEIGHT: 222.6 LBS

## 2024-05-02 DIAGNOSIS — R60.0 LOCALIZED EDEMA: ICD-10-CM

## 2024-05-02 DIAGNOSIS — Z79.01 CHRONIC ANTICOAGULATION: ICD-10-CM

## 2024-05-02 DIAGNOSIS — I10 PRIMARY HYPERTENSION: ICD-10-CM

## 2024-05-02 DIAGNOSIS — R73.9 HYPERGLYCEMIA: ICD-10-CM

## 2024-05-02 DIAGNOSIS — I48.0 PAROXYSMAL ATRIAL FIBRILLATION (HCC): ICD-10-CM

## 2024-05-02 DIAGNOSIS — E78.2 MIXED HYPERLIPIDEMIA: ICD-10-CM

## 2024-05-02 PROCEDURE — 99214 OFFICE O/P EST MOD 30 MIN: CPT | Performed by: NURSE PRACTITIONER

## 2024-05-02 PROCEDURE — 3078F DIAST BP <80 MM HG: CPT | Performed by: NURSE PRACTITIONER

## 2024-05-02 PROCEDURE — 3074F SYST BP LT 130 MM HG: CPT | Performed by: NURSE PRACTITIONER

## 2024-05-02 RX ORDER — NAPROXEN 500 MG/1
500 TABLET ORAL 2 TIMES DAILY WITH MEALS
COMMUNITY

## 2024-05-03 ASSESSMENT — ENCOUNTER SYMPTOMS
PALPITATIONS: 0
DIZZINESS: 0
BRUISES/BLEEDS EASILY: 0
LOSS OF CONSCIOUSNESS: 0
ABDOMINAL PAIN: 0
COUGH: 0
PND: 0
ORTHOPNEA: 0
HEADACHES: 0
CHILLS: 0
INSOMNIA: 0
BACK PAIN: 1
SHORTNESS OF BREATH: 0
NAUSEA: 0
FEVER: 0
MYALGIAS: 0

## 2024-05-03 NOTE — PROGRESS NOTES
Chief Complaint   Patient presents with    Follow-Up    Atrial Fibrillation    Anticoagulation    HTN (Controlled)    Hyperlipidemia       Subjective     Venkata David is a 68 y.o. male who presents today for six month follow-up of PAFib, anticoagulation with ASA only, HTN and hyperlipidemia.    Venkata is a 68 year old male with history of paroxysmal atrial fibrillation on Flecainide and Cardizem, previous anticoagulation with Eliquis, hypertension, hyperlipidemia, and chronic LE edema. Echcoardiogram done in April 2023 was normal, LVEF 60-65%. He was last seen in October 2023.    In April 2023, he had stopped Eliquis, due to cost. He declined Coumadin, but did agree to ASA only. ZioPatch worn for 3 days did not show any AFib. He understands the risks of being on ASA only.     He is here today for six month follow-up. Generally, he remains stable. He denies any chest pain, pressure, tightness or discomfort. No palpitations or racing of his heart; no dizziness, lightheadedness, syncope or presyncope. BP has been stable. LE edema has been improved/stable.    He is compliant with his medications.     Past Medical History:   Diagnosis Date    Bowel habit changes     Constipation    CHF (congestive heart failure) (HCC) 12/2017    Echocardiogram with rEF of 45%. April 2023: Echocardiogram with LVEF 60-65%.    Chronic anticoagulation     Disorder of thyroid     As a child only    Hepatitis C 1980    Hyperglycemia     Hyperlipidemia     Hypertension     Indigestion     Obesity     Paroxysmal atrial fibrillation (HCC) 04/2023    Echocardiogram with normal LV size, LVEF 60-65%. Normal RA and RV, moderately enlarged LA. Mild AR, mild MR, mild TR. RVSP 45mmHg.    Psychiatric problem     Renal disorder     KIDNEY FAILURE EARLY PT.    Urinary bladder disorder     ENLARGED PROSTATE     Past Surgical History:   Procedure Laterality Date    TRANS URETHRAL RESECTION PROSTATE  12/8/2017    Procedure: TRANS URETHRAL RESECTION  PROSTATE;  Surgeon: Jonny Chávez M.D.;  Location: SURGERY [facility];  Service: Urology    INGUINAL HERNIA REPAIR CHILD  as child     History reviewed. No pertinent family history.  Social History     Socioeconomic History    Marital status:      Spouse name: Not on file    Number of children: Not on file    Years of education: Not on file    Highest education level: Not on file   Occupational History    Not on file   Tobacco Use    Smoking status: Former     Current packs/day: 0.00     Types: Cigarettes     Quit date: 2018     Years since quittin.9    Smokeless tobacco: Never   Vaping Use    Vaping Use: Never used   Substance and Sexual Activity    Alcohol use: No    Drug use: No    Sexual activity: Not on file   Other Topics Concern    Not on file   Social History Narrative    Not on file     Social Determinants of Health     Financial Resource Strain: Not on file   Food Insecurity: Not on file   Transportation Needs: Not on file   Physical Activity: Not on file   Stress: Not on file   Social Connections: Not on file   Intimate Partner Violence: Not on file   Housing Stability: Not on file     No Known Allergies  Outpatient Encounter Medications as of 2024   Medication Sig Dispense Refill    naproxen (NAPROSYN) 500 MG Tab Take 500 mg by mouth 2 times a day with meals.      atorvastatin (LIPITOR) 20 MG Tab Take 1 Tablet by mouth every day. 100 Tablet 3    DILTIAZem CD (CARDIZEM CD) 120 MG CAPSULE SR 24 HR Take 1 Capsule by mouth every day. 100 Capsule 3    flecainide (TAMBOCOR) 50 MG tablet Take 1 Tablet by mouth 2 times a day. 200 Tablet 3    furosemide (LASIX) 40 MG Tab Take 1 Tablet by mouth every day. 100 Tablet 3    spironolactone (ALDACTONE) 50 MG Tab Take 1 Tablet by mouth every day. 100 Tablet 3    aspirin (ASA) 81 MG Chew Tab chewable tablet Chew 1 Tablet every day. 100 Tablet 3    Multiple Vitamins-Minerals (CENTRUM ADULTS PO) Take  by mouth every day.       "Glucosamine-Chondroitin (GLUCOSAMINE CHONDR COMPLEX PO) Take  by mouth every day.       No facility-administered encounter medications on file as of 5/2/2024.     Review of Systems   Constitutional:  Positive for malaise/fatigue. Negative for chills and fever.   HENT:  Negative for congestion.    Respiratory:  Negative for cough and shortness of breath.    Cardiovascular:  Positive for leg swelling. Negative for chest pain, palpitations, orthopnea and PND.        Stable, treated with Lasix/Aldactone.   Gastrointestinal:  Negative for abdominal pain and nausea.   Musculoskeletal:  Positive for back pain and joint pain. Negative for myalgias.   Skin:  Negative for rash.   Neurological:  Negative for dizziness, loss of consciousness and headaches.   Endo/Heme/Allergies:  Does not bruise/bleed easily.   Psychiatric/Behavioral:  The patient does not have insomnia.               Objective     /66 (BP Location: Left arm, Patient Position: Sitting, BP Cuff Size: Adult)   Pulse 76   Resp 16   Ht 1.727 m (5' 8\")   Wt 101 kg (222 lb 9.6 oz)   SpO2 91%   BMI 33.85 kg/m²     Physical Exam  Constitutional:       Appearance: He is well-developed.      Comments: BMI 33.85 (weight is down slightly)   HENT:      Head: Normocephalic.   Neck:      Vascular: No JVD.   Cardiovascular:      Rate and Rhythm: Normal rate and regular rhythm.      Heart sounds: Normal heart sounds.   Pulmonary:      Effort: Pulmonary effort is normal. No respiratory distress.      Breath sounds: Normal breath sounds. No wheezing or rales.   Abdominal:      General: Bowel sounds are normal. There is no distension.      Palpations: Abdomen is soft.      Tenderness: There is no abdominal tenderness.   Musculoskeletal:         General: Normal range of motion.      Cervical back: Normal range of motion and neck supple.      Comments: 1+ LE edema, R>L, stable from previous.  Skin changes of chronic venous stasis.   Skin:     General: Skin is warm and " dry.      Findings: No rash.   Neurological:      Mental Status: He is alert and oriented to person, place, and time.       Impression of ZioPatch of 4/13-4/17/2024  1.   Normal heart rate range  2.   No significant mayco or tachyarrhythmias were detected.  3.   Rare PACs and rare PVCs were detected.  4.   There were no patient triggered events recorded.     Echocardiogram of April 2023:  Normal LV size, LVEF 60-65%. Normal RA and RV. Moderately enlarged LA. Mild AR, mild MR, mild TR. RVSP 45mmHg.     Echocardiogram of March 2022:  Normal LV size, LVEF 60-65%. Mildly enlarged RV. Enlarged RA and LA. Mild AR, mild MR, mild TR. RVSP 40mmHg.     CONCLUSIONS OF TTE OF 12/9/2017:  No prior study is available for comparison.   Mildly reduced left ventricular systolic function.  Left ventricular ejection fraction is visually estimated to be 45%.  Mild mitral regurgitation.  Moderate tricuspid regurgitation.  Estimated right ventricular systolic pressure is 30 mmHg.     NUCLEAR IMAGING INTERPRETATION OF MPI FO 12/19/2019:  Atrial fibrillation with right bundle branch block and rapid ventricular   response throughout test (resting heart rate 176 bpm).   No evidence of significant jeopardized viable myocardium or prior myocardial infarction.   Global hypokinesis. LV ejection fraction = 34%.      LABS AS OF 10/26/2024:  Glucose 130  BUN 22  Creatinine 1.23  GFR 63  Potassium 5.0  HgbA1c 7.0    Assessment & Plan     1. Paroxysmal atrial fibrillation (HCC)  TSH      2. Chronic anticoagulation  CBC WITHOUT DIFFERENTIAL      3. Primary hypertension  CBC WITHOUT DIFFERENTIAL      4. Mixed hyperlipidemia  Comp Metabolic Panel    Lipid Profile      5. Localized edema        6. Hyperglycemia  HEMOGLOBIN A1C          Medical Decision Making: Today's Assessment/Status/Plan:        1. Paroxysmal atrial fibrillation, in sinus rhythm today. He remains on Flecainide and Diltiazem. ZioPatch (3 days) in April 2023 showed no AFib. He does not  have any symptomatic palpitations.     2. Previously on Eliquis for anticoagulation. He is [id number] score 4 (age, HTN, CHF, probable DM), and he declines Coumadin. He understands his risk. He does agree to ASA 18mg onced daily.     3. Hypertension, treated with Diltiazem and Lasix/Aldactone, stable.     4. Hyperlipidemia, treated with Lipitor 20mg. To check fasting CMP and lipid panel.    5. LE edema, stable on Lasix/Aldactone.     6. Hyperglycemia, to recheck fasting BMP and HgbA1c. Last HgbA1c was 7.0; mostly like he needs treatment. Consider SGLT2a. He needs follow-up with PCP too.     Same medications for now; labs as above.  Follow-up in 6 months, sooner if clinical condition changes.  Urged to follow-up with PCP to discuss possible treatment for DM.

## 2024-07-01 LAB
CHOLEST SERPL-MCNC: 113 MG/DL
HDLC SERPL-MCNC: 30 MG/DL
LDLC SERPL CALC-MCNC: 62 MG/DL
TRIGL SERPL-MCNC: 119 MG/DL

## 2024-07-08 DIAGNOSIS — E78.2 MIXED HYPERLIPIDEMIA: ICD-10-CM

## 2024-07-08 DIAGNOSIS — I10 PRIMARY HYPERTENSION: ICD-10-CM

## 2024-07-08 DIAGNOSIS — Z79.01 CHRONIC ANTICOAGULATION: ICD-10-CM

## 2024-07-18 ENCOUNTER — TELEPHONE (OUTPATIENT)
Dept: CARDIOLOGY | Facility: MEDICAL CENTER | Age: 69
End: 2024-07-18
Payer: MEDICARE

## 2024-07-18 DIAGNOSIS — I10 PRIMARY HYPERTENSION: ICD-10-CM

## 2024-07-26 NOTE — ASSESSMENT & PLAN NOTE
[facility]   DISCHARGE SUMMARY        MRN: [id number]    GENERAL INFORMATION:  - Admission Date/Time: 7/23/2024  2:51 AM  - Discharge Date/Time: 7/26/2024    - PCP: Nawaf Randall MD  notified via Perfect Serve   Discharge Diagnoses:  Shortness of breath     Hospital Course:       Chin is a 85-year-old male with a past medical history of BPH, coronary artery disease status post CABG x 4 a few years ago, dementia, GERD, hypertension, Parkinson's disease presenting for acute hypoxic respiratory failure and shortness of breath.  During the course of his stay he was evaluated by infectious disease he was started on antibiotics for bilateral pneumonia.  Viral illness was ruled out COVID 19 was ruled out.  He was hypoxic and hypoxia improved significantly he was weaned off to room air.  He will be discharged to acute inpatient rehab.  He will complete a course of antibiotics while in the inpatient rehab unit.     Acute hypoxic respiratory failure secondary to viral versus bacterial pneumonia  -He will be weaned off oxygen Dr. Dang will follow in the inpatient rehab unit     Continuechest PT and incentive spirometer as needed only     Will be discharged to acute inpatient rehab today     Coronary disease status post CABG  -Resume home meds     Dementia  -Will monitor mental status     GERD  -Resume PPI     Hypertension  -Monitor blood pressure     Parkinson disease  -Resume home meds     Severe deconditioningPT OT recommending acute inpatient rehab     Mild fusiform ascending aortic aneurysm measuring up to 4.5 cm in diameter.  -Discussed with Dr. Zuniga not a true aneurysm no further workup is needed        Discussed meaning of DNR/DNI status vs Full Code status as well as plan for advanced directive vs. Surrogate and what this entails. Discussed CPR, intubation, electrical cardioversion, vasopressors and antiarrhythmics. Also made clear that code status can change at any time and is not set in stone. Goals of  Continue flomax.  Will need shoemaker catheter for 2 weeks.  Urology following.   care, diagnosis and natural disease progression and expectations discussed. Patient/family understands.  No CPR or intubation discussed with patient he will be discharged to acute inpatient rehab today the family is in agreement to his severe weakness and deconditioning    Advanced care planning care time 16 minutes.     Discharge Summary Plan   Will be discharged to acute inpatient rehab unit we will follow him in the rehab unit see today       Education and Follow-up   Counseled: patient, family, regarding diagnosis.       Follow up:  PCP in 1 week.   Follow-up Information       Follow up With Specialties Details Why Contact Info    Pillo Zuniga MD Cardiothoracic Surgery Follow up in 1 week(s)  [address] [address]  [phone]               DISCHARGE INSTRUCTIONS  I have  reviewed all medications with patient/family and reviewed potential side effects. Patient/family  is to seek medical attention immediately should symptoms recurr, worsen, or if any other problems/abnormalities arise. Patient/family understands these instructions. Is to follow up with PCP as noted above. All specialists and consultants as noted. Patient/family educated about compliance with medications and expectations for the course of treatment.   Patient was thoroughly informed regarding new medications and possible interactions/adverse effects, recommendations from consultants and specialists. Pt was also instructed to call 911 and/or report to the emergency room should symptoms recurr or if any other problems arise.     PHYSICAL EXAM:  Visit Vitals  BP (!) 150/74   Pulse 65   Temp 97.5 °F (36.4 °C) (Oral)   Resp 16   Ht 5' 5\" (1.651 m)   Wt 74.3 kg (163 lb 12.8 oz)   SpO2 95%   BMI 27.26 kg/m²       Physical Exam:  Physical exam respiratory status significant improvement no further wheezing  See full details in progress note  He is very weak and deconditioned and hoping to improve in the rehab unit.    Lab  Results:  Recent Labs   Lab 07/26/24  0358 07/24/24  0359 07/23/24  0304   SODIUM 141 138 135   POTASSIUM 3.5 3.4 3.7   CHLORIDE 105 105 102   CO2 28 25 25   BUN 14 25* 23*   CREATININE 0.85 1.03 1.02   GLUCOSE 95 83 132*   ALBUMIN  --  2.6* 3.3*   AST  --  24 19   BILIRUBIN  --  1.0 2.3*       Imaging:    CTA CHEST PULMONARY EMBOLISM   Final Result   No evidence of pulmonary artery emboli.      Scattered opacities in segmental and subsegmental bronchi which could   represent mucous plugging or endobronchial infectious material. No   consolidation.      Mild fusiform ascending aortic aneurysm measuring up to 4.5 cm in diameter.      Prominent coronary artery calcifications. Cholelithiasis.         Electronically Signed by: JIMMY CASTANEDA M.D.    Signed on: 7/23/2024 5:35 AM    Workstation ID: [id number]      XR CHEST PA OR AP 1 VIEW   Final Result   The heart is mildly enlarged. There are no findings of pulmonary vascular   congestion. No consolidation, pleural effusion or pneumothorax. Findings   compatible with prior midline sternotomy.         Electronically Signed by: JIMMY CASTANEDA M.D.    Signed on: 7/23/2024 3:31 AM    Workstation ID: [id number]           Pathology/CX results:  * Cannot find OR log *    Microbiology Results       None             Discharge Medications:       Summary of your Discharge Medications      You have not been prescribed any medications.           Primary Care Physician:  Nawaf Randall MD      I spent 35 minutes on the discharge.  This includes the following: Reviewed all vitals, medications, new orders, I/O, labs, micro, radiology, nurses notes, pertinent consultant notes, as well as discussing patient's diagnosis and plan of care. This excludes any additional time noted for additional services such as advanced care planning, smoking cessation, substance abuse counseling which are separate from the 35 min time spent on discharge.     Primary care physician  notified.        Ronan Hollis MD    [facility]  7/26/2024 10:41 AM

## 2024-08-27 ENCOUNTER — HOSPITAL ENCOUNTER (OUTPATIENT)
Dept: RADIOLOGY | Facility: MEDICAL CENTER | Age: 69
End: 2024-08-27

## 2024-08-29 ENCOUNTER — TELEPHONE (OUTPATIENT)
Dept: CARDIOLOGY | Facility: PHYSICIAN GROUP | Age: 69
End: 2024-08-29
Payer: MEDICARE

## 2024-08-29 DIAGNOSIS — D68.69 HYPERCOAGULABLE STATE DUE TO PAROXYSMAL ATRIAL FIBRILLATION (HCC): Chronic | ICD-10-CM

## 2024-08-29 DIAGNOSIS — I48.0 HYPERCOAGULABLE STATE DUE TO PAROXYSMAL ATRIAL FIBRILLATION (HCC): Chronic | ICD-10-CM

## 2024-08-29 DIAGNOSIS — I48.3 TYPICAL ATRIAL FLUTTER (HCC): ICD-10-CM

## 2024-08-30 ENCOUNTER — TELEPHONE (OUTPATIENT)
Dept: VASCULAR LAB | Facility: MEDICAL CENTER | Age: 69
End: 2024-08-30
Payer: MEDICARE

## 2024-08-30 ENCOUNTER — TELEPHONE (OUTPATIENT)
Dept: CARDIOLOGY | Facility: MEDICAL CENTER | Age: 69
End: 2024-08-30
Payer: MEDICARE

## 2024-08-30 NOTE — TELEPHONE ENCOUNTER
Called pt to discuss further. He said he was advised the following med regimen:  diltiazem 40mg daily   metoprolol 50mg BID  flecainide 50mg BID    (Please also see tele encounter per CW 08/29/24.)    Pt hesitant to restart to metoprolol as he feels this medication caused his symptoms of rising heart rate. He also said he had a similar reaction with Cartia (vs Cardizem) when he took this in the past. He held the metoprolol dose today and he reports he feels well, he said his heart rate is down, denies palpitations.    Inquired if pt checks his BP/HR at home, at this time he isn't. Advised he should start checking BP/HR to see how he's doing with his current med regimen. He is agreeable to this and will try to obtain a cuff over the weekend. Advised pt he should update our office with his readings over the weekend via phone call next week or inContact message. Also advised pt will reach out to AB and see if any other recommendations in the mean time. Pt verbalized understanding and was appreciative of call back.    To AB: pt said he is feeling well without metoprolol, advised pt to check BP/HRs over the weekend in the mean time and give us updates next week. Please advise if any other recommendations. Thank you!

## 2024-08-30 NOTE — TELEPHONE ENCOUNTER
[facility]     Received anticoagulation referral from Dr Jolley on 8/29/24.     Called and spoke with patient - he needs to arrange ride to [place] travel may be difficult, may be candidate for DOAC therapy vs warfarin. Currently not on anticoagulation.    1st attempt     Insurance: Medicare  PCP: External  Locations to be seen: [facility]    If no response by 8/30/24 OR 2 no shows/cancellations, will remove from referral list    Frantz BensonD  [facility]ticoagulation[facility]Pharmac[facility]  Phone [phone]

## 2024-08-30 NOTE — TELEPHONE ENCOUNTER
Reason for Consult:  Asked by Dr Ruben Reece see this patient with atrial flutter  Patient's PCP: [place] [facility] D.O.    CC: Chest pain    HPI: This is a 69-year-old gentleman with history of paroxysmal atrial fibrillation on flecainide not on anticoagulation due to patient's decision who presents with chest pains he is found to have atrial flutter with a heart rate of 121 and was hospitalized.  Unfortunately he could not get long-acting diltiazem at his pharmacy and had just been on metoprolol and flecainide.  His rate was controlled with 240 mg of diltiazem and 50 mg twice daily of metoprolol as well as flecainide    Medications / Drug list prior to admission:  Current Outpatient Medications on File Prior to Visit   Medication Sig Dispense Refill    metoprolol tartrate (LOPRESSOR) 25 MG Tab Take 1 Tablet by mouth 2 times a day. 60 Tablet 3    naproxen (NAPROSYN) 500 MG Tab Take 500 mg by mouth 2 times a day with meals.      atorvastatin (LIPITOR) 20 MG Tab Take 1 Tablet by mouth every day. 100 Tablet 3    flecainide (TAMBOCOR) 50 MG tablet Take 1 Tablet by mouth 2 times a day. 200 Tablet 3    furosemide (LASIX) 40 MG Tab Take 1 Tablet by mouth every day. 100 Tablet 3    spironolactone (ALDACTONE) 50 MG Tab Take 1 Tablet by mouth every day. 100 Tablet 3    aspirin (ASA) 81 MG Chew Tab chewable tablet Chew 1 Tablet every day. 100 Tablet 3    Glucosamine-Chondroitin (GLUCOSAMINE CHONDR COMPLEX PO) Take  by mouth every day.      Multiple Vitamins-Minerals (CENTRUM ADULTS PO) Take  by mouth every day.       No current facility-administered medications on file prior to visit.       Current list of administered Medications:    Current Outpatient Medications:     metoprolol tartrate (LOPRESSOR) 25 MG Tab, Take 1 Tablet by mouth 2 times a day., Disp: 60 Tablet, Rfl: 3    naproxen (NAPROSYN) 500 MG Tab, Take 500 mg by mouth 2 times a day with meals., Disp: , Rfl:     atorvastatin (LIPITOR) 20 MG Tab, Take 1 Tablet by  mouth every day., Disp: 100 Tablet, Rfl: 3    flecainide (TAMBOCOR) 50 MG tablet, Take 1 Tablet by mouth 2 times a day., Disp: 200 Tablet, Rfl: 3    furosemide (LASIX) 40 MG Tab, Take 1 Tablet by mouth every day., Disp: 100 Tablet, Rfl: 3    spironolactone (ALDACTONE) 50 MG Tab, Take 1 Tablet by mouth every day., Disp: 100 Tablet, Rfl: 3    aspirin (ASA) 81 MG Chew Tab chewable tablet, Chew 1 Tablet every day., Disp: 100 Tablet, Rfl: 3    Glucosamine-Chondroitin (GLUCOSAMINE CHONDR COMPLEX PO), Take  by mouth every day., Disp: , Rfl:     Multiple Vitamins-Minerals (CENTRUM ADULTS PO), Take  by mouth every day., Disp: , Rfl:     Past Medical History:   Diagnosis Date    Bowel habit changes     Constipation    CHF (congestive heart failure) (HCC) 2017    Echocardiogram with rEF of 45%. 2023: Echocardiogram with LVEF 60-65%.    Chronic anticoagulation     Disorder of thyroid     As a child only    Hepatitis C     Hyperglycemia     Hyperlipidemia     Hypertension     Indigestion     Obesity     Paroxysmal atrial fibrillation (HCC) 2023    Echocardiogram with normal LV size, LVEF 60-65%. Normal RA and RV, moderately enlarged LA. Mild AR, mild MR, mild TR. RVSP 45mmHg.    Psychiatric problem     Renal disorder     KIDNEY FAILURE EARLY PT.    Urinary bladder disorder     ENLARGED PROSTATE       Past Surgical History:   Procedure Laterality Date    TRANS URETHRAL RESECTION PROSTATE  2017    Procedure: TRANS URETHRAL RESECTION PROSTATE;  Surgeon: Jonny Chávez M.D.;  Location: SURGERY [facility];  Service: Urology    INGUINAL HERNIA REPAIR CHILD  as child       No family history on file.  Patient family history was personally reviewed, no pertinent family history to current presentation    Social History     Tobacco Use    Smoking status: Former     Current packs/day: 0.00     Types: Cigarettes     Quit date: 2018     Years since quittin.2    Smokeless tobacco: Never   Vaping Use    Vaping  status: Never Used   Substance Use Topics    Alcohol use: No    Drug use: No       ALLERGIES:  No Known Allergies    Review of systems:  A complete review of symptoms was reviewed with patient. This is reviewed in H&P and PMH. ALL OTHERS reviewed and negative    Physical exam:  [unfilled]  General: No acute distress.   EYES: no jaundice  HEENT: OP clear   Neck:  No JVD.   CVS:  [  RRR.   Resp: Normal respiratory effort,   Abdomen: ND,  Skin: Grossly nothing acute no obvious rashes  Neurological: Alert, Moves all extremities  Extremities:   [  modetate edema. No cyanosis.       Data:  Laboratory studies personally reviewed by me:  No results found for this or any previous visit (from the past 24 hour(s)).    Imaging:  No orders to display           EKG tracings personally reviewed by me  atrial flutter    Echocardiogram images personally reviewed by me show  normal EF pulm HTN at 45    All pertinent features of laboratory and imaging reviewed including primary images where applicable        Assessment / Plan:  Atrial flutter  I strongly enforced with him the importance of anticoagulation, he prefers Coumadin    His rate is adequately controlled with diltiazem to 40 mg daily and metoprolol 50 twice daily as well as flecainide 50 twice daily    We will get him in as soon as possible for MAHOGANY guided cardioversion    He would best benefit from at least atrial flutter ablation to avoid further recurrences    Orders Placed This Encounter    [facility]-CARDIOVERSION    EC-MAHOGANY W/O CONT    REFERRAL TO CARDIOLOGY    Referral to Anticoagulation Monitoring            I personally discussed his case with  Dr Ruben Reece who was at the bedside    Future Appointments   Date Time Provider Department Center   11/7/2024  1:45 PM JASMIN Ayala. [facility] None       It is my pleasure to participate in the care of Mr. David.  Please do not hesitate to contact me with questions or concerns.    Robb Jolley MD PhD [facility]  Cardiologist [facility]iss  [facility]    8/29/2024    Please note that this dictation was created using voice recognition software. There may be errors I did not discover before finalizing the note.

## 2024-08-30 NOTE — TELEPHONE ENCOUNTER
AB  Caller: Venkata David    Topic/issue: Patient states that he had to take metoprolol instead of diltiazem. Patient states that this medication change put him in the hospital. Patient was advised to go back on the diltiazem. Patient wants to discuss this situation with Hansa. Please call patient back to discuss.     Please see encounter 07/18/2024.    Callback Number: [phone]    Thank you,  Zena DE PAZ

## 2024-09-04 ENCOUNTER — TELEPHONE (OUTPATIENT)
Dept: VASCULAR LAB | Facility: MEDICAL CENTER | Age: 69
End: 2024-09-04
Payer: MEDICARE

## 2024-09-04 NOTE — TELEPHONE ENCOUNTER
"[facility] Heart and Vascular Health and Pharmacotherapy Programs     Received anticoagulation referral from Dr Jolley on 8-29-24.     S/w patient regarding referral.  He requests c/b in two weeks as he needs to \"speak with Hansa Stratton's representative about the different directions I have things going with\".  When questioned about this, patient states that it has to do with metoprolol and diltiazem and an adverse reaction he would like to \"get over\" before talking about or scheduling any thing related to anticoagulation.  Patient also expresses that we are \"rushing into things with the this blood thinner stuff\".  Offered initial appt to have thorough discussion regarding anticoagulation, patient declines, again opting for c/b in two weeks.    2nd attempt     Insurance: Medicare  PCP: External  Locations to be seen: [facility] or [facility]    If no response by 9-29-24 OR 2 no shows/cancellations, will remove from referral list    [place] Gonzalez, PharmD, BCACP  [facility]armac[facility]  Phone [phone]    "

## 2024-09-18 ENCOUNTER — TELEPHONE (OUTPATIENT)
Dept: VASCULAR LAB | Facility: MEDICAL CENTER | Age: 69
End: 2024-09-18
Payer: MEDICARE

## 2024-09-18 NOTE — TELEPHONE ENCOUNTER
Thanks [place]. I have discussed anticoagulation with him in the past; will do so again in Nov 2024.  AB

## 2024-09-18 NOTE — TELEPHONE ENCOUNTER
"[facility] Heart and Vascular Health and Pharmacotherapy Programs     Received anticoagulation referral from Dr Jolley on 8-29-24.     Once again s/w patient regarding referral.  He states that he still \"wants to take things one day at a time with all that is going on\".  He does not wish to start anticoagulation or schedule initial visit at this time.  He is amenable to discussing further with cardiology at visit in November.  At this time, will close referral as patient declines to establish care.      [place] Gonzalez, PharmD, BCACP  [facility]tico[facility]/Pharmacotherapy [facility]  Phone [phone]    CC Hansa DEL ROSARIO  "

## 2024-09-25 ENCOUNTER — TELEPHONE (OUTPATIENT)
Dept: HEALTH INFORMATION MANAGEMENT | Facility: OTHER | Age: 69
End: 2024-09-25
Payer: MEDICARE

## 2024-10-29 ENCOUNTER — TELEPHONE (OUTPATIENT)
Dept: CARDIOLOGY | Facility: MEDICAL CENTER | Age: 69
End: 2024-10-29
Payer: MEDICARE

## 2024-11-07 ENCOUNTER — OFFICE VISIT (OUTPATIENT)
Dept: CARDIOLOGY | Facility: PHYSICIAN GROUP | Age: 69
End: 2024-11-07
Payer: MEDICARE

## 2024-11-07 ENCOUNTER — TELEPHONE (OUTPATIENT)
Dept: CARDIOLOGY | Facility: MEDICAL CENTER | Age: 69
End: 2024-11-07

## 2024-11-07 VITALS
RESPIRATION RATE: 12 BRPM | BODY MASS INDEX: 32.28 KG/M2 | HEIGHT: 68 IN | OXYGEN SATURATION: 96 % | WEIGHT: 213 LBS | DIASTOLIC BLOOD PRESSURE: 60 MMHG | SYSTOLIC BLOOD PRESSURE: 118 MMHG | HEART RATE: 85 BPM

## 2024-11-07 DIAGNOSIS — R60.0 LOWER EXTREMITY EDEMA: ICD-10-CM

## 2024-11-07 DIAGNOSIS — I10 PRIMARY HYPERTENSION: ICD-10-CM

## 2024-11-07 DIAGNOSIS — I48.0 HYPERCOAGULABLE STATE DUE TO PAROXYSMAL ATRIAL FIBRILLATION (HCC): ICD-10-CM

## 2024-11-07 DIAGNOSIS — D68.69 HYPERCOAGULABLE STATE DUE TO PAROXYSMAL ATRIAL FIBRILLATION (HCC): ICD-10-CM

## 2024-11-07 DIAGNOSIS — E78.2 MIXED HYPERLIPIDEMIA: ICD-10-CM

## 2024-11-07 DIAGNOSIS — I48.0 PAROXYSMAL ATRIAL FIBRILLATION (HCC): ICD-10-CM

## 2024-11-07 PROCEDURE — 3074F SYST BP LT 130 MM HG: CPT | Performed by: NURSE PRACTITIONER

## 2024-11-07 PROCEDURE — 99214 OFFICE O/P EST MOD 30 MIN: CPT | Performed by: NURSE PRACTITIONER

## 2024-11-07 PROCEDURE — 3078F DIAST BP <80 MM HG: CPT | Performed by: NURSE PRACTITIONER

## 2024-11-07 RX ORDER — FLECAINIDE ACETATE 50 MG/1
50 TABLET ORAL 2 TIMES DAILY
Qty: 200 TABLET | Refills: 3 | Status: SHIPPED | OUTPATIENT
Start: 2024-11-07

## 2024-11-07 RX ORDER — DABIGATRAN ETEXILATE 150 MG/1
150 CAPSULE ORAL 2 TIMES DAILY
Qty: 180 CAPSULE | Refills: 3 | Status: SHIPPED | OUTPATIENT
Start: 2024-11-07

## 2024-11-07 RX ORDER — SPIRONOLACTONE 50 MG/1
50 TABLET, FILM COATED ORAL DAILY
Qty: 100 TABLET | Refills: 3 | Status: SHIPPED | OUTPATIENT
Start: 2024-11-07

## 2024-11-07 RX ORDER — TORSEMIDE 20 MG/1
20 TABLET ORAL DAILY
Qty: 90 TABLET | Refills: 3 | Status: SHIPPED | OUTPATIENT
Start: 2024-11-07

## 2024-11-07 RX ORDER — DILTIAZEM HYDROCHLORIDE 240 MG/1
240 CAPSULE, COATED, EXTENDED RELEASE ORAL DAILY
Qty: 100 CAPSULE | Refills: 3 | Status: SHIPPED | OUTPATIENT
Start: 2024-11-07

## 2024-11-07 RX ORDER — ATORVASTATIN CALCIUM 20 MG/1
20 TABLET, FILM COATED ORAL DAILY
Qty: 100 TABLET | Refills: 3 | Status: SHIPPED | OUTPATIENT
Start: 2024-11-07 | End: 2025-12-12

## 2024-11-07 NOTE — PROGRESS NOTES
Family Nurse Practitioner Student Note    Cosigner/Preceptor: JAM Ayala    Chief Complaint:                                                                                                                                 Chief Complaint   Patient presents with    Atrial Fibrillation    HTN (Controlled)    Peripheral Edema        HPI:   Venkata David is a 69 y.o. male who presents today for six month follow-up of PAFib, anticoagulation with ASA only, HTN and hyperlipidemia.     Venkata is a 69 year old male with history of paroxysmal atrial fibrillation on Flecainide and Cardizem, previous anticoagulation with Eliquis, hypertension, hyperlipidemia, and chronic LE edema. Echcoardiogram done in April 2023 was normal, LVEF 60-65%. He was last seen in May 2024.     In April 2023, he had stopped Eliquis, due to cost. He declined Coumadin, but did agree to ASA only. ZioPatch worn for 3 days did not show any AFib. He understands the risks of being on ASA only. Today we discussed options for anticoagulation.    He was change to once daily diltiazem and metoprolol was added. This caused him to experience some chest pressure that he ended up going to the hospital for. He feels better on diltiazem only. Once stopping the metoprolol his symptoms resolved.     He is here today for six month follow-up. Generally, he remains stable. He denies any chest pain, pressure, tightness or discomfort. No palpitations or racing of his heart; no dizziness, lightheadedness, syncope or presyncope. BP has been stable. LE edema has worsened over the last few weeks.     He is compliant with his medications.    Current Outpatient Medications   Medication Sig Dispense Refill    dilTIAZem CD (CARDIZEM CD) 240 MG CAPSULE SR 24 HR Take 1 Capsule by mouth every day. 100 Capsule 3    torsemide (DEMADEX) 20 MG Tab Take 1 Tablet by mouth every day. 90 Tablet 3    spironolactone (ALDACTONE) 50 MG Tab Take 1 Tablet by mouth every day. 100 Tablet 3     "atorvastatin (LIPITOR) 20 MG Tab Take 1 Tablet by mouth every day. 100 Tablet 3    flecainide (TAMBOCOR) 50 MG tablet Take 1 Tablet by mouth 2 times a day. 200 Tablet 3    dabigatran (PRADAXA) 150 MG Cap capsule Take 1 Capsule by mouth 2 times a day. 180 Capsule 3    furosemide (LASIX) 40 MG Tab Take 1 Tablet by mouth every day. 100 Tablet 3    aspirin (ASA) 81 MG Chew Tab chewable tablet Chew 1 Tablet every day. 100 Tablet 3    Glucosamine-Chondroitin (GLUCOSAMINE CHONDR COMPLEX PO) Take  by mouth every day.      Multiple Vitamins-Minerals (CENTRUM ADULTS PO) Take  by mouth every day.       No current facility-administered medications for this visit.       Allergies as of 11/07/2024    (No Known Allergies)        ROS:  All systems negative expect as addressed in assessment and plan.     Assessment:  /60 (BP Location: Left arm, Patient Position: Sitting, BP Cuff Size: Adult)   Pulse 85   Resp 12   Ht 1.727 m (5' 8\")   Wt 96.6 kg (213 lb)   SpO2 96%   BMI 32.39 kg/m²     Physical Exam  Constitutional:       Appearance: Normal appearance.   HENT:      Head: Normocephalic.      Nose: Nose normal.   Cardiovascular:      Rate and Rhythm: Normal rate and regular rhythm.      Pulses: Normal pulses.      Heart sounds: Normal heart sounds, S1 normal and S2 normal.   Pulmonary:      Effort: Pulmonary effort is normal.      Breath sounds: Normal breath sounds.   Musculoskeletal:         General: Normal range of motion.      Cervical back: Normal range of motion.      Right lower leg: 3+ Edema present.      Left lower leg: 3+ Edema present.   Skin:     General: Skin is warm and dry.   Neurological:      Mental Status: He is alert and oriented to person, place, and time.   Psychiatric:         Mood and Affect: Mood normal.         Behavior: Behavior normal.         Thought Content: Thought content normal.         Judgment: Judgment normal.       Plan:  69 y.o. male with the following issues.    1. Paroxysmal atrial " fibrillation (HCC)  EKG    dilTIAZem CD (CARDIZEM CD) 240 MG CAPSULE SR 24 HR    flecainide (TAMBOCOR) 50 MG tablet    dabigatran (PRADAXA) 150 MG Cap capsule       Hypercoagulable state due to paroxysmal atrial fibrillation (HCC)  dabigatran (PRADAXA) 150 MG Cap capsule    Chronic Stable    Continue flecainide 50 mg BID and diltiazem 240 mg daily.   Today we reviewed anticoagulation and atrial fibrillation. He is currently only taking ASA 81 mg bid due to cost of Eliquis and inability to complete regular monitoring for warfarin. We discussed risks of not being on OAC with atrial fibrillation. He is willing to take dabigatran if it is affordable. We send a prescription to [facility]. He will let us know if he is unable to afford the dabigatran. We did discuss options to increase affordability of Eliquis such as patient assistance, or being seen once in [place] for ability to utilize specialty pharmacy pricing. He is hesitant to have to complete patient assistance paperwork annually or drive to [place].       2. Primary hypertension  torsemide (DEMADEX) 20 MG Tab    spironolactone (ALDACTONE) 50 MG Tab    Chornic Stable    Continue spironolactone 50 mg daily, start turosemide 20 MG daily      3. Mixed hyperlipidemia  atorvastatin (LIPITOR) 20 MG Tab    Chronic stable    Continue atorvastatin 20 MG every evening.      4. Lower extremity edema  EC-ECHOCARDIOGRAM COMPLETE W/O CONT    torsemide (DEMADEX) 20 MG Tab    spironolactone (ALDACTONE) 50 MG Tab    Comp Metabolic Panel    He experiences chronic significant lower extremity edema. He states that his edema is worse than it has been, but it does not get significantly better. Today we discussed possible contributing factors to his edema. We discussed completing an echocardiogram to review heart and valve function with increase in his baseline edema. We will trial changing furosemide to torsemide to see if there is improved response. Continue spironolactone 50 mg daily. We will  have him check a CMP in two weeks to review kidney and liver function as well as electrolytes.    He has been taking OTC pain relievers and antiinflammatories daily. He takes 1000 mg of tylenol in the AM and 800 mg of ibuprofen in the afternoon every day. We discussed stopping the ibuprofen due to the possible contribution to his edema as well as the associated risks with long term NSAID use. He will switch to tylenol 1000 mg 1-3x daily. He will take low dose ibuprofen sparingly and only use Naproxen very very rarely if absolutely needed. Discussed possible increase in blood thinning with dabigatran. He verbalizes understanding.           Return in about 3 months (around 2/7/2025) for FU with AB.

## 2024-11-14 NOTE — PROGRESS NOTES
Chief Complaint   Patient presents with   • Follow-Up   • Edema   • Atrial Fibrillation   • Anticoagulation   • HTN (Controlled)   • Hyperlipidemia       Subjective:   Venkata David is a 64 y.o. male who presents today for three month follow-up of LE edema, atrial fibrillation and anticoagulation.    Venkata is a 64 year old male with history of paroxysmal atrial fibrillation on Flecainide and Cardizem, anticoagulation with Eliquis, hypertension, hyperlipidemia, and chronic LE edema, normally followed by Dr. Garcia.    At last follow-up, he was having worsening LE edema, complicated by cellulitis. His diuretics were increased.    Since the last visit, he was treated with IV Vancomycin, and cellulitis is much improved. LE edema is also better.   Generally, he is stable and feeling better: no further symptomatic palpitations. No chest pain, pressure or discomfort; some mild orthopnea, but no PND. Ongoing lightheadedness, but no syncope or presyncope. No fever or chills.     Past Medical History:   Diagnosis Date   • Bowel habit changes     Constipation   • CHF (congestive heart failure) (HCC) 12/2017    Echocardiogram with rEF of 45%. January 2019: Echocardiogram with LVEF 60%.   • Chronic anticoagulation    • Disorder of thyroid     As a child only   • Hepatitis C 1980   • Hyperglycemia    • Hyperlipidemia    • Hypertension    • Indigestion    • Obesity    • Paroxysmal atrial fibrillation (HCC) 01/2019     Echocardiogram with normal LV size, LVEF 60%. Grade I diastolic dysfunction. Mildly dilated LA. Mild MR, mild AR. Moderate TR.   • Psychiatric problem    • Renal disorder     KIDNEY FAILURE EARLY PT.   • Urinary bladder disorder     ENLARGED PROSTATE     Past Surgical History:   Procedure Laterality Date   • TRANS URETHRAL RESECTION PROSTATE  12/8/2017    Procedure: TRANS URETHRAL RESECTION PROSTATE;  Surgeon: Jonny Chávez M.D.;  Location: SURGERY [facility];  Service: Urology   • INGUINAL HERNIA REPAIR  Resent medications to the correct pharmacy   CHILD  as child     History reviewed. No pertinent family history.  Social History     Social History   • Marital status:      Spouse name: N/A   • Number of children: N/A   • Years of education: N/A     Occupational History   • Not on file.     Social History Main Topics   • Smoking status: Former Smoker     Packs/day: 0.25     Quit date: 6/6/2018   • Smokeless tobacco: Never Used   • Alcohol use No   • Drug use: No   • Sexual activity: Not on file     Other Topics Concern   • Not on file     Social History Narrative   • No narrative on file     No Known Allergies  Outpatient Encounter Prescriptions as of 7/18/2019   Medication Sig Dispense Refill   • DILTIAZem CD (CARDIZEM CD) 120 MG CAPSULE SR 24 HR Take 1 Cap by mouth every day. 90 Cap 3   • apixaban (ELIQUIS) 5mg Tab Take 1 Tab by mouth 2 Times a Day. 28 Tab 0   • flecainide (TAMBOCOR) 50 MG tablet Take 1 Tab by mouth 2 times a day. 180 Tab 3   • atorvastatin (LIPITOR) 20 MG Tab Take 1 Tab by mouth every day. 90 Tab 3   • furosemide (LASIX) 20 MG Tab Take 3 Tabs by mouth every day. 90 Tab 6   • spironolactone (ALDACTONE) 50 MG Tab Take 1 Tab by mouth every day. 30 Tab 6   • [DISCONTINUED] clindamycin (CLEOCIN) 300 MG Cap TAKE 1 CAPSULE BY MOUTH EVERY 8 HOURS INTERVAL FOR 5 DAYS  0   • [DISCONTINUED] spironolactone (ALDACTONE) 25 MG Tab Take 25 mg by mouth every day.  3   • [DISCONTINUED] vancomycin (VANCOCIN) 125 MG capsule 1 CAPSULE EVERY 6 HOURS FOR 7 DAYS  0   • [DISCONTINUED] amoxicillin-clavulanate (AUGMENTIN) 875-125 MG Tab   0   • [DISCONTINUED] lansoprazole (PREVACID) 30 MG CAPSULE DELAYED RELEASE Take 30 mg by mouth as needed.       No facility-administered encounter medications on file as of 7/18/2019.      Review of Systems   Constitutional: Negative for chills, fever and malaise/fatigue.   HENT: Negative for congestion.    Respiratory: Negative for cough and shortness of breath.    Cardiovascular: Positive for leg swelling. Negative for  "chest pain, palpitations, orthopnea and PND.        Much better with Lasix/Aldactone, and since cellulitis is now resolved.    Gastrointestinal: Negative for abdominal pain and nausea.   Musculoskeletal: Negative for myalgias.   Skin: Negative for rash.   Neurological: Negative for dizziness, loss of consciousness and headaches.   Endo/Heme/Allergies: Does not bruise/bleed easily.   Psychiatric/Behavioral: The patient does not have insomnia.         Objective:   /68 (BP Location: Left arm, Patient Position: Sitting, BP Cuff Size: Adult)   Pulse 84   Ht 1.727 m (5' 8\")   Wt 100.7 kg (222 lb)   SpO2 93%   BMI 33.75 kg/m²     Physical Exam   Constitutional: He is oriented to person, place, and time. He appears well-developed and well-nourished.   Weight is down 16 pounds.   HENT:   Head: Normocephalic.   Eyes: EOM are normal.   Neck: Normal range of motion. Neck supple. No JVD present.   Cardiovascular: Normal rate, regular rhythm and normal heart sounds.    Pulmonary/Chest: Effort normal and breath sounds normal. No respiratory distress. He has no wheezes. He has no rales.   Abdominal: Soft. Bowel sounds are normal. He exhibits no distension. There is no tenderness.   Musculoskeletal: Normal range of motion. He exhibits edema.   1+ LE edema, R>L, much improved since last visit.  Erythema is also resolved.   Neurological: He is alert and oriented to person, place, and time.   Skin: Skin is warm and dry. No rash noted.   Psychiatric: He has a normal mood and affect.       Assessment:     1. Localized edema  Basic Metabolic Panel   2. Paroxysmal atrial fibrillation (HCC)     3. Chronic anticoagulation     4. Essential hypertension     5. Mixed hyperlipidemia         Medical Decision Making:  Today's Assessment / Status / Plan:     1. LE edema, complicated by cellulitis, now resolved. Legs are looking much better. To check BMP, given Lasix and Aldactone use.    2. Paroxysmal atrial fibrillation, in sinus rhythm " on Flecainide.    3. Chronic anticoagulation with Eliquis.    4. Hypertension, treated and stable. BP is good today.    5. Hyperlipidemia, treated with statin.    Legs are much better. Same medications for now. BMP to check renal function and K+ levels. FU in 6 months.    Collaborating MD: Isamar

## 2024-11-22 DIAGNOSIS — R60.0 LOWER EXTREMITY EDEMA: ICD-10-CM

## 2025-01-07 DIAGNOSIS — R60.0 LOWER EXTREMITY EDEMA: ICD-10-CM

## 2025-02-20 ENCOUNTER — OFFICE VISIT (OUTPATIENT)
Dept: CARDIOLOGY | Facility: PHYSICIAN GROUP | Age: 70
End: 2025-02-20
Payer: MEDICARE

## 2025-02-20 VITALS
HEART RATE: 88 BPM | WEIGHT: 193 LBS | OXYGEN SATURATION: 94 % | SYSTOLIC BLOOD PRESSURE: 102 MMHG | HEIGHT: 68 IN | BODY MASS INDEX: 29.25 KG/M2 | DIASTOLIC BLOOD PRESSURE: 68 MMHG | RESPIRATION RATE: 14 BRPM

## 2025-02-20 DIAGNOSIS — I48.0 PAROXYSMAL ATRIAL FIBRILLATION (HCC): ICD-10-CM

## 2025-02-20 DIAGNOSIS — E78.2 MIXED HYPERLIPIDEMIA: ICD-10-CM

## 2025-02-20 DIAGNOSIS — R63.4 WEIGHT LOSS: ICD-10-CM

## 2025-02-20 DIAGNOSIS — I10 PRIMARY HYPERTENSION: ICD-10-CM

## 2025-02-20 DIAGNOSIS — D68.69 HYPERCOAGULABLE STATE DUE TO PAROXYSMAL ATRIAL FIBRILLATION (HCC): Chronic | ICD-10-CM

## 2025-02-20 DIAGNOSIS — R73.9 HYPERGLYCEMIA: ICD-10-CM

## 2025-02-20 DIAGNOSIS — Z79.01 CHRONIC ANTICOAGULATION: ICD-10-CM

## 2025-02-20 DIAGNOSIS — I48.0 HYPERCOAGULABLE STATE DUE TO PAROXYSMAL ATRIAL FIBRILLATION (HCC): Chronic | ICD-10-CM

## 2025-02-20 PROCEDURE — 3074F SYST BP LT 130 MM HG: CPT | Performed by: NURSE PRACTITIONER

## 2025-02-20 PROCEDURE — 3078F DIAST BP <80 MM HG: CPT | Performed by: NURSE PRACTITIONER

## 2025-02-20 PROCEDURE — 99214 OFFICE O/P EST MOD 30 MIN: CPT | Performed by: NURSE PRACTITIONER

## 2025-02-20 ASSESSMENT — ENCOUNTER SYMPTOMS
ORTHOPNEA: 0
PALPITATIONS: 0
DIZZINESS: 0
HEADACHES: 0
WEIGHT LOSS: 1
ABDOMINAL PAIN: 0
NAUSEA: 0
BRUISES/BLEEDS EASILY: 0
BACK PAIN: 1
LOSS OF CONSCIOUSNESS: 0
SHORTNESS OF BREATH: 0
CHILLS: 0
PND: 0
MYALGIAS: 0
FEVER: 0
INSOMNIA: 0
COUGH: 0

## 2025-02-20 NOTE — PROGRESS NOTES
"Chief Complaint   Patient presents with    Follow-Up    Atrial Fibrillation    Anticoagulation    HTN (Controlled)    Hyperlipidemia       Subjective     Venkata David is a 69 y.o. male who presents today for three month follow-up of PAFib, anticoagulation, HTN and hyperlipidemia.    Venkata is a 69 year old male with history of paroxysmal atrial fibrillation on Flecainide and Cardizem, previous anticoagulation with Eliquis, hypertension, hyperlipidemia, and chronic LE edema. Echcoardiogram done in January 2025 showed normal LV size, LVEF 60-65%, and moderate MR. He was last seen in November 2024. A     In April 2023, he had stopped Eliquis, due to cost. He declined Coumadin, but did agree to ASA only. ZioPatch worn for 3 days did not show any AFib. He understands the risks of being on ASA only.  At follow-up in November 2024, we tried Rx'ing Pradaxa for cost, but he has NOT been taking this.     He is here today for three month follow-up. He has lost 20-25 pounds in the last 3 months; he admits he is not eating as much lately, due to \"esophagus problems.\" He does not remember the last time he had an EGD and/or colonoscopy done.    He denies any chest pain, pressure, tightness or discomfort. No palpitations or racing of his heart; no dizziness, lightheadedness, syncope or presyncope. BP has been stable. LE edema has been better with weight loss.    Past Medical History:   Diagnosis Date    Bowel habit changes     Constipation    CHF (congestive heart failure) ([facility]) 12/2017    Echocardiogram with rEF of 45%. April 2023: Echocardiogram with LVEF 60-65%.    Chronic anticoagulation     Disorder of thyroid     As a child only    Hepatitis C 1980    Hyperglycemia     Hyperlipidemia     Hypertension     Indigestion     Obesity     Paroxysmal atrial fibrillation (HCC) 01/2025    Echocardiogram with normal LV size, LVEF 60-65%. Normal RA and RV, enlarged LA. Moderate AR, mild MR, mild TR. RVSP 40mmHg.    Psychiatric " problem     Renal disorder     KIDNEY FAILURE EARLY PT.    Urinary bladder disorder     ENLARGED PROSTATE     Past Surgical History:   Procedure Laterality Date    TRANS URETHRAL RESECTION PROSTATE  2017    Procedure: TRANS URETHRAL RESECTION PROSTATE;  Surgeon: Jonny Chávez M.D.;  Location: SURGERY [facility];  Service: Urology    INGUINAL HERNIA REPAIR CHILD  as child     History reviewed. No pertinent family history.  Social History     Socioeconomic History    Marital status:      Spouse name: Not on file    Number of children: Not on file    Years of education: Not on file    Highest education level: Not on file   Occupational History    Not on file   Tobacco Use    Smoking status: Former     Current packs/day: 0.00     Types: Cigarettes     Quit date: 2018     Years since quittin.7    Smokeless tobacco: Never   Vaping Use    Vaping status: Never Used   Substance and Sexual Activity    Alcohol use: No    Drug use: No    Sexual activity: Not on file   Other Topics Concern    Not on file   Social History Narrative    Not on file     Social Drivers of Health     Financial Resource Strain: Not on file   Food Insecurity: Not on file   Transportation Needs: Not on file   Physical Activity: Not on file   Stress: Not on file   Social Connections: Not on file   Intimate Partner Violence: Not on file   Housing Stability: Not on file     No Known Allergies  Outpatient Encounter Medications as of 2025   Medication Sig Dispense Refill    dilTIAZem CD (CARDIZEM CD) 240 MG CAPSULE SR 24 HR Take 1 Capsule by mouth every day. 100 Capsule 3    torsemide (DEMADEX) 20 MG Tab Take 1 Tablet by mouth every day. 90 Tablet 3    spironolactone (ALDACTONE) 50 MG Tab Take 1 Tablet by mouth every day. 100 Tablet 3    atorvastatin (LIPITOR) 20 MG Tab Take 1 Tablet by mouth every day. 100 Tablet 3    flecainide (TAMBOCOR) 50 MG tablet Take 1 Tablet by mouth 2 times a day. 200 Tablet 3    aspirin (ASA) 81 MG  "Chew Tab chewable tablet Chew 1 Tablet every day. 100 Tablet 3    Glucosamine-Chondroitin (GLUCOSAMINE CHONDR COMPLEX PO) Take  by mouth every day.      Multiple Vitamins-Minerals (CENTRUM ADULTS PO) Take  by mouth every day.      [DISCONTINUED] dabigatran (PRADAXA) 150 MG Cap capsule Take 1 Capsule by mouth 2 times a day. 180 Capsule 3     No facility-administered encounter medications on file as of 2/20/2025.     Review of Systems   Constitutional:  Positive for malaise/fatigue and weight loss. Negative for chills and fever.   HENT:  Negative for congestion.    Respiratory:  Negative for cough and shortness of breath.    Cardiovascular:  Positive for leg swelling. Negative for chest pain, palpitations, orthopnea and PND.        Stable, treated with Lasix/Aldactone.   Gastrointestinal:  Negative for abdominal pain and nausea.   Musculoskeletal:  Positive for back pain and joint pain. Negative for myalgias.   Skin:  Negative for rash.   Neurological:  Negative for dizziness, loss of consciousness and headaches.   Endo/Heme/Allergies:  Does not bruise/bleed easily.   Psychiatric/Behavioral:  The patient does not have insomnia.               Objective     /68 (BP Location: Left arm, Patient Position: Sitting, BP Cuff Size: Adult)   Pulse 88   Resp 14   Ht 1.727 m (5' 8\")   Wt 87.5 kg (193 lb)   SpO2 94%   BMI 29.35 kg/m²     Physical Exam  Constitutional:       Appearance: He is well-developed.      Comments: BMI 29.35 (weight is down 20+ pounds)   HENT:      Head: Normocephalic.   Neck:      Vascular: No JVD.   Cardiovascular:      Rate and Rhythm: Normal rate and regular rhythm.      Heart sounds: Normal heart sounds.   Pulmonary:      Effort: Pulmonary effort is normal. No respiratory distress.      Breath sounds: Normal breath sounds. No wheezing or rales.   Abdominal:      General: Bowel sounds are normal. There is no distension.      Palpations: Abdomen is soft.      Tenderness: There is no abdominal " tenderness.   Musculoskeletal:         General: Normal range of motion.      Cervical back: Normal range of motion and neck supple.      Comments: 1+ LE edema, R>L, stable from previous.  Skin changes of chronic venous stasis.   Skin:     General: Skin is warm and dry.      Findings: No rash.   Neurological:      Mental Status: He is alert and oriented to person, place, and time.       Echocardiogram of January 2025 ([facility]):  Normal LV size, LVEF 60-65%. Normal RA and RV, enlarged LA. Moderate AR, mild MR, mild TR. RVSP 40mmHg.    Impression of ZioPatch of 4/13-4/17/2024  1.   Normal heart rate range  2.   No significant mayco or tachyarrhythmias were detected.  3.   Rare PACs and rare PVCs were detected.  4.   There were no patient triggered events recorded.      Echocardiogram of April 2023:  Normal LV size, LVEF 60-65%. Normal RA and RV. Moderately enlarged LA. Mild AR, mild MR, mild TR. RVSP 45mmHg.     Echocardiogram of March 2022:  Normal LV size, LVEF 60-65%. Mildly enlarged RV. Enlarged RA and LA. Mild AR, mild MR, mild TR. RVSP 40mmHg.     CONCLUSIONS OF TTE OF 12/9/2017:  No prior study is available for comparison.   Mildly reduced left ventricular systolic function.  Left ventricular ejection fraction is visually estimated to be 45%.  Mild mitral regurgitation.  Moderate tricuspid regurgitation.  Estimated right ventricular systolic pressure is 30 mmHg.     NUCLEAR IMAGING INTERPRETATION OF MPI FO 12/19/2019:  Atrial fibrillation with right bundle branch block and rapid ventricular   response throughout test (resting heart rate 176 bpm).   No evidence of significant jeopardized viable myocardium or prior myocardial infarction.   Global hypokinesis. LV ejection fraction = 34%.     LABS AS OF 11/22/2024 ([facility]):  Glucose 129  BUN 31  Creatinine 1.21  GFR 64  Potassium 4.0  AST 19  ALT 9     Lab Results   Component Value Date/Time    CHOLSTRLTOT 113 07/01/2024 12:00 AM    LDL 62 07/01/2024 12:00 AM    HDL 30  07/01/2024 12:00 AM    TRIGLYCERIDE 119 07/01/2024 12:00 AM          Assessment & Plan     1. Paroxysmal atrial fibrillation (HCC)        2. Hypercoagulable state due to paroxysmal atrial fibrillation (HCC)        3. Chronic anticoagulation        4. Primary hypertension        5. Mixed hyperlipidemia        6. Hyperglycemia        7. Weight loss            Medical Decision Making: Today's Assessment/Status/Plan:        1. Paroxysmal atrial fibrillation, in sinus rhythm today, on Flecainide and Diltiazem. ZioPatch (3 days) in April 2023 showed no AFib. Echocardiogram in January 2025 was stable from previous, except now moderate AR. Will monitor over time.     2. Previously on Eliquis for anticoagulation. He is [id number] score 4 (age, HTN, CHF, probable DM), and he declines Coumadin. He understands his risk. He has not been able to afford Eliquis of Pradaxa either; he does take ASA 81mg once daily.     3. Hypertension, treated with Diltiazem and Lasix/Aldactone, stable. BP is on the lower side today.     4. Hyperlipidemia, treated with Lipitor 20mg. Last lipid panel was good/stable.     5. LE edema, stable on Lasix/Aldactone.     6. Weight loss, 20-25 pounds in the last 3 months. TSH in July 2024 was normal. Urged to FU with PCP for possible referral to GI; might need EGD and/or colonoscopy.     Same medications for now; labs as above.    Follow-up in 6 months, sooner if clinical condition changes.  Urged to follow-up with PCP to discuss possible referral to GI for weight loss.

## 2025-03-08 ENCOUNTER — HOSPITAL ENCOUNTER (OUTPATIENT)
Dept: RADIOLOGY | Facility: MEDICAL CENTER | Age: 70
End: 2025-03-08
Payer: MEDICARE

## 2025-03-08 ENCOUNTER — APPOINTMENT (OUTPATIENT)
Dept: RADIOLOGY | Facility: MEDICAL CENTER | Age: 70
DRG: 683 | End: 2025-03-08
Attending: EMERGENCY MEDICINE
Payer: MEDICARE

## 2025-03-08 ENCOUNTER — HOSPITAL ENCOUNTER (INPATIENT)
Facility: MEDICAL CENTER | Age: 70
LOS: 2 days | DRG: 683 | End: 2025-03-11
Attending: EMERGENCY MEDICINE | Admitting: HOSPITALIST
Payer: MEDICARE

## 2025-03-08 DIAGNOSIS — R07.9 CHEST PAIN, UNSPECIFIED TYPE: ICD-10-CM

## 2025-03-08 DIAGNOSIS — I48.0 PAROXYSMAL ATRIAL FIBRILLATION (HCC): ICD-10-CM

## 2025-03-08 DIAGNOSIS — E78.2 MIXED HYPERLIPIDEMIA: ICD-10-CM

## 2025-03-08 DIAGNOSIS — N17.9 AKI (ACUTE KIDNEY INJURY) (HCC): ICD-10-CM

## 2025-03-08 DIAGNOSIS — R79.89 ELEVATED TROPONIN: ICD-10-CM

## 2025-03-08 DIAGNOSIS — R13.19 ESOPHAGEAL DYSPHAGIA: ICD-10-CM

## 2025-03-08 DIAGNOSIS — Z79.01 CHRONIC ANTICOAGULATION: ICD-10-CM

## 2025-03-08 PROCEDURE — 93005 ELECTROCARDIOGRAM TRACING: CPT | Mod: TC | Performed by: EMERGENCY MEDICINE

## 2025-03-08 PROCEDURE — 99285 EMERGENCY DEPT VISIT HI MDM: CPT

## 2025-03-08 PROCEDURE — 71045 X-RAY EXAM CHEST 1 VIEW: CPT

## 2025-03-08 PROCEDURE — 93005 ELECTROCARDIOGRAM TRACING: CPT | Mod: TC

## 2025-03-09 ENCOUNTER — APPOINTMENT (OUTPATIENT)
Dept: RADIOLOGY | Facility: MEDICAL CENTER | Age: 70
DRG: 683 | End: 2025-03-09
Attending: HOSPITALIST
Payer: MEDICARE

## 2025-03-09 PROBLEM — R00.1 JUNCTIONAL BRADYCARDIA: Status: ACTIVE | Noted: 2025-03-09

## 2025-03-09 PROBLEM — R13.10 DYSPHAGIA: Status: ACTIVE | Noted: 2025-03-09

## 2025-03-09 PROBLEM — N17.9 ACUTE RENAL FAILURE (HCC): Status: ACTIVE | Noted: 2025-03-09

## 2025-03-09 PROBLEM — R07.9 CHEST PAIN: Status: ACTIVE | Noted: 2025-03-09

## 2025-03-09 LAB
ALBUMIN SERPL BCP-MCNC: 3.3 G/DL (ref 3.2–4.9)
ALBUMIN/GLOB SERPL: 1.1 G/DL
ALP SERPL-CCNC: 137 U/L (ref 30–99)
ALT SERPL-CCNC: 9 U/L (ref 2–50)
ANION GAP SERPL CALC-SCNC: 12 MMOL/L (ref 7–16)
AST SERPL-CCNC: 13 U/L (ref 12–45)
BASOPHILS # BLD AUTO: 0.4 % (ref 0–1.8)
BASOPHILS # BLD: 0.03 K/UL (ref 0–0.12)
BILIRUB SERPL-MCNC: <0.2 MG/DL (ref 0.1–1.5)
BUN SERPL-MCNC: 54 MG/DL (ref 8–22)
CALCIUM ALBUM COR SERPL-MCNC: 9.6 MG/DL (ref 8.5–10.5)
CALCIUM SERPL-MCNC: 9 MG/DL (ref 8.5–10.5)
CHLORIDE SERPL-SCNC: 107 MMOL/L (ref 96–112)
CO2 SERPL-SCNC: 19 MMOL/L (ref 20–33)
CREAT SERPL-MCNC: 1.56 MG/DL (ref 0.5–1.4)
EKG IMPRESSION: NORMAL
EKG IMPRESSION: NORMAL
EOSINOPHIL # BLD AUTO: 0.18 K/UL (ref 0–0.51)
EOSINOPHIL NFR BLD: 2.5 % (ref 0–6.9)
ERYTHROCYTE [DISTWIDTH] IN BLOOD BY AUTOMATED COUNT: 47.8 FL (ref 35.9–50)
GFR SERPLBLD CREATININE-BSD FMLA CKD-EPI: 47 ML/MIN/1.73 M 2
GLOBULIN SER CALC-MCNC: 3.1 G/DL (ref 1.9–3.5)
GLUCOSE SERPL-MCNC: 119 MG/DL (ref 65–99)
HCT VFR BLD AUTO: 41.6 % (ref 42–52)
HGB BLD-MCNC: 13.5 G/DL (ref 14–18)
IMM GRANULOCYTES # BLD AUTO: 0.04 K/UL (ref 0–0.11)
IMM GRANULOCYTES NFR BLD AUTO: 0.6 % (ref 0–0.9)
LYMPHOCYTES # BLD AUTO: 1.52 K/UL (ref 1–4.8)
LYMPHOCYTES NFR BLD: 21.1 % (ref 22–41)
MCH RBC QN AUTO: 27.6 PG (ref 27–33)
MCHC RBC AUTO-ENTMCNC: 32.5 G/DL (ref 32.3–36.5)
MCV RBC AUTO: 85.1 FL (ref 81.4–97.8)
MONOCYTES # BLD AUTO: 0.81 K/UL (ref 0–0.85)
MONOCYTES NFR BLD AUTO: 11.3 % (ref 0–13.4)
NEUTROPHILS # BLD AUTO: 4.61 K/UL (ref 1.82–7.42)
NEUTROPHILS NFR BLD: 64.1 % (ref 44–72)
NRBC # BLD AUTO: 0 K/UL
NRBC BLD-RTO: 0 /100 WBC (ref 0–0.2)
NT-PROBNP SERPL IA-MCNC: 439 PG/ML (ref 0–125)
PLATELET # BLD AUTO: 440 K/UL (ref 164–446)
PMV BLD AUTO: 9.1 FL (ref 9–12.9)
POTASSIUM SERPL-SCNC: 5.1 MMOL/L (ref 3.6–5.5)
PROT SERPL-MCNC: 6.4 G/DL (ref 6–8.2)
RBC # BLD AUTO: 4.89 M/UL (ref 4.7–6.1)
SODIUM SERPL-SCNC: 138 MMOL/L (ref 135–145)
TROPONIN T SERPL-MCNC: 21 NG/L (ref 6–19)
TROPONIN T SERPL-MCNC: 32 NG/L (ref 6–19)
TROPONIN T SERPL-MCNC: 33 NG/L (ref 6–19)
TSH SERPL DL<=0.005 MIU/L-ACNC: 2.27 UIU/ML (ref 0.38–5.33)
WBC # BLD AUTO: 7.2 K/UL (ref 4.8–10.8)

## 2025-03-09 PROCEDURE — 84484 ASSAY OF TROPONIN QUANT: CPT

## 2025-03-09 PROCEDURE — A9270 NON-COVERED ITEM OR SERVICE: HCPCS | Performed by: HOSPITALIST

## 2025-03-09 PROCEDURE — 93005 ELECTROCARDIOGRAM TRACING: CPT | Mod: TC | Performed by: HOSPITALIST

## 2025-03-09 PROCEDURE — 97602 WOUND(S) CARE NON-SELECTIVE: CPT

## 2025-03-09 PROCEDURE — 36415 COLL VENOUS BLD VENIPUNCTURE: CPT

## 2025-03-09 PROCEDURE — 93010 ELECTROCARDIOGRAM REPORT: CPT | Performed by: INTERNAL MEDICINE

## 2025-03-09 PROCEDURE — 700102 HCHG RX REV CODE 250 W/ 637 OVERRIDE(OP): Performed by: HOSPITALIST

## 2025-03-09 PROCEDURE — 74210 X-RAY XM PHRNX&/CRV ESOPH C+: CPT

## 2025-03-09 PROCEDURE — 85025 COMPLETE CBC W/AUTO DIFF WBC: CPT

## 2025-03-09 PROCEDURE — 92610 EVALUATE SWALLOWING FUNCTION: CPT

## 2025-03-09 PROCEDURE — 700102 HCHG RX REV CODE 250 W/ 637 OVERRIDE(OP)

## 2025-03-09 PROCEDURE — 83880 ASSAY OF NATRIURETIC PEPTIDE: CPT

## 2025-03-09 PROCEDURE — 80053 COMPREHEN METABOLIC PANEL: CPT

## 2025-03-09 PROCEDURE — 84443 ASSAY THYROID STIM HORMONE: CPT

## 2025-03-09 PROCEDURE — 700105 HCHG RX REV CODE 258: Performed by: HOSPITALIST

## 2025-03-09 PROCEDURE — 770020 HCHG ROOM/CARE - TELE (206)

## 2025-03-09 PROCEDURE — A9270 NON-COVERED ITEM OR SERVICE: HCPCS

## 2025-03-09 PROCEDURE — 99223 1ST HOSP IP/OBS HIGH 75: CPT | Mod: AI | Performed by: HOSPITALIST

## 2025-03-09 RX ORDER — AMMONIUM LACTATE 12 G/100G
LOTION TOPICAL 2 TIMES DAILY
Status: DISCONTINUED | OUTPATIENT
Start: 2025-03-09 | End: 2025-03-09

## 2025-03-09 RX ORDER — DILTIAZEM HYDROCHLORIDE 240 MG/1
240 CAPSULE, COATED, EXTENDED RELEASE ORAL 2 TIMES DAILY
Status: DISCONTINUED | OUTPATIENT
Start: 2025-03-09 | End: 2025-03-11 | Stop reason: HOSPADM

## 2025-03-09 RX ORDER — ACETAMINOPHEN 325 MG/1
650 TABLET ORAL EVERY 6 HOURS PRN
Status: DISCONTINUED | OUTPATIENT
Start: 2025-03-09 | End: 2025-03-11 | Stop reason: HOSPADM

## 2025-03-09 RX ORDER — HYDROPHOR 42 G/100G
OINTMENT TOPICAL 2 TIMES DAILY
Status: DISCONTINUED | OUTPATIENT
Start: 2025-03-09 | End: 2025-03-11 | Stop reason: HOSPADM

## 2025-03-09 RX ORDER — HYDRALAZINE HYDROCHLORIDE 20 MG/ML
10 INJECTION INTRAMUSCULAR; INTRAVENOUS EVERY 6 HOURS PRN
Status: DISCONTINUED | OUTPATIENT
Start: 2025-03-09 | End: 2025-03-11 | Stop reason: HOSPADM

## 2025-03-09 RX ORDER — ASPIRIN 81 MG/1
81 TABLET ORAL DAILY
Status: DISCONTINUED | OUTPATIENT
Start: 2025-03-10 | End: 2025-03-11 | Stop reason: HOSPADM

## 2025-03-09 RX ORDER — OXYCODONE HYDROCHLORIDE 5 MG/1
5 TABLET ORAL EVERY 4 HOURS PRN
Refills: 0 | Status: DISCONTINUED | OUTPATIENT
Start: 2025-03-09 | End: 2025-03-11 | Stop reason: HOSPADM

## 2025-03-09 RX ORDER — SODIUM CHLORIDE, SODIUM LACTATE, POTASSIUM CHLORIDE, CALCIUM CHLORIDE 600; 310; 30; 20 MG/100ML; MG/100ML; MG/100ML; MG/100ML
INJECTION, SOLUTION INTRAVENOUS CONTINUOUS
Status: DISCONTINUED | OUTPATIENT
Start: 2025-03-09 | End: 2025-03-11

## 2025-03-09 RX ORDER — FLECAINIDE ACETATE 100 MG/1
50 TABLET ORAL 2 TIMES DAILY
Status: DISCONTINUED | OUTPATIENT
Start: 2025-03-09 | End: 2025-03-11 | Stop reason: HOSPADM

## 2025-03-09 RX ORDER — AMINOPHYLLINE 25 MG/ML
100 INJECTION, SOLUTION INTRAVENOUS
Status: DISCONTINUED | OUTPATIENT
Start: 2025-03-09 | End: 2025-03-11 | Stop reason: HOSPADM

## 2025-03-09 RX ORDER — REGADENOSON 0.08 MG/ML
0.4 INJECTION, SOLUTION INTRAVENOUS
Status: COMPLETED | OUTPATIENT
Start: 2025-03-09 | End: 2025-03-10

## 2025-03-09 RX ORDER — NITROGLYCERIN 0.4 MG/1
0.4 TABLET SUBLINGUAL
Status: DISCONTINUED | OUTPATIENT
Start: 2025-03-09 | End: 2025-03-11 | Stop reason: HOSPADM

## 2025-03-09 RX ADMIN — SODIUM CHLORIDE, POTASSIUM CHLORIDE, SODIUM LACTATE AND CALCIUM CHLORIDE: 600; 310; 30; 20 INJECTION, SOLUTION INTRAVENOUS at 19:39

## 2025-03-09 RX ADMIN — Medication: at 10:24

## 2025-03-09 RX ADMIN — DILTIAZEM HYDROCHLORIDE 240 MG: 240 CAPSULE, EXTENDED RELEASE ORAL at 17:24

## 2025-03-09 RX ADMIN — ACETAMINOPHEN 650 MG: 325 TABLET ORAL at 10:37

## 2025-03-09 RX ADMIN — SODIUM CHLORIDE, POTASSIUM CHLORIDE, SODIUM LACTATE AND CALCIUM CHLORIDE: 600; 310; 30; 20 INJECTION, SOLUTION INTRAVENOUS at 03:41

## 2025-03-09 RX ADMIN — Medication: at 17:24

## 2025-03-09 RX ADMIN — FLECAINIDE ACETATE 50 MG: 100 TABLET ORAL at 05:17

## 2025-03-09 RX ADMIN — FLECAINIDE ACETATE 50 MG: 100 TABLET ORAL at 17:24

## 2025-03-09 SDOH — ECONOMIC STABILITY: TRANSPORTATION INSECURITY
IN THE PAST 12 MONTHS, HAS LACK OF RELIABLE TRANSPORTATION KEPT YOU FROM MEDICAL APPOINTMENTS, MEETINGS, WORK OR FROM GETTING THINGS NEEDED FOR DAILY LIVING?: NO

## 2025-03-09 SDOH — ECONOMIC STABILITY: TRANSPORTATION INSECURITY
IN THE PAST 12 MONTHS, HAS THE LACK OF TRANSPORTATION KEPT YOU FROM MEDICAL APPOINTMENTS OR FROM GETTING MEDICATIONS?: NO

## 2025-03-09 ASSESSMENT — PATIENT HEALTH QUESTIONNAIRE - PHQ9
2. FEELING DOWN, DEPRESSED, IRRITABLE, OR HOPELESS: NOT AT ALL
1. LITTLE INTEREST OR PLEASURE IN DOING THINGS: NOT AT ALL
SUM OF ALL RESPONSES TO PHQ9 QUESTIONS 1 AND 2: 0

## 2025-03-09 ASSESSMENT — LIFESTYLE VARIABLES
AVERAGE NUMBER OF DAYS PER WEEK YOU HAVE A DRINK CONTAINING ALCOHOL: 0
HAVE YOU EVER FELT YOU SHOULD CUT DOWN ON YOUR DRINKING: NO
HOW MANY TIMES IN THE PAST YEAR HAVE YOU HAD 5 OR MORE DRINKS IN A DAY: 0
TOTAL SCORE: 0
SUBSTANCE_ABUSE: 0
TOTAL SCORE: 0
ALCOHOL_USE: NO
EVER FELT BAD OR GUILTY ABOUT YOUR DRINKING: NO
EVER HAD A DRINK FIRST THING IN THE MORNING TO STEADY YOUR NERVES TO GET RID OF A HANGOVER: NO
HAVE PEOPLE ANNOYED YOU BY CRITICIZING YOUR DRINKING: NO
TOTAL SCORE: 0
ON A TYPICAL DAY WHEN YOU DRINK ALCOHOL HOW MANY DRINKS DO YOU HAVE: 0
CONSUMPTION TOTAL: NEGATIVE
DOES PATIENT WANT TO STOP DRINKING: NO

## 2025-03-09 ASSESSMENT — ENCOUNTER SYMPTOMS
DIZZINESS: 0
SORE THROAT: 0
DEPRESSION: 0
FEVER: 0
PALPITATIONS: 0
ORTHOPNEA: 0
SPUTUM PRODUCTION: 0
BACK PAIN: 0
FLANK PAIN: 0
WEAKNESS: 0
VOMITING: 0
STRIDOR: 0
NECK PAIN: 0
BLOOD IN STOOL: 0
HALLUCINATIONS: 0
DOUBLE VISION: 0
NAUSEA: 0
MYALGIAS: 0
HEARTBURN: 0
CONSTIPATION: 0
WHEEZING: 0
COUGH: 0
PHOTOPHOBIA: 0
TREMORS: 0
EYE PAIN: 0
CLAUDICATION: 0
DIARRHEA: 0
TINGLING: 0
BRUISES/BLEEDS EASILY: 0
HEMOPTYSIS: 0
POLYDIPSIA: 0
ABDOMINAL PAIN: 0
FALLS: 0
SHORTNESS OF BREATH: 0
DIAPHORESIS: 0
SINUS PAIN: 0
PND: 0
CHILLS: 0
HEADACHES: 0
BLURRED VISION: 0

## 2025-03-09 ASSESSMENT — COGNITIVE AND FUNCTIONAL STATUS - GENERAL
DAILY ACTIVITIY SCORE: 24
MOVING TO AND FROM BED TO CHAIR: A LITTLE
SUGGESTED CMS G CODE MODIFIER DAILY ACTIVITY: CH
CLIMB 3 TO 5 STEPS WITH RAILING: A LITTLE
STANDING UP FROM CHAIR USING ARMS: A LITTLE
TURNING FROM BACK TO SIDE WHILE IN FLAT BAD: A LITTLE
MOBILITY SCORE: 18
SUGGESTED CMS G CODE MODIFIER MOBILITY: CK
MOVING FROM LYING ON BACK TO SITTING ON SIDE OF FLAT BED: A LITTLE
WALKING IN HOSPITAL ROOM: A LITTLE

## 2025-03-09 ASSESSMENT — SOCIAL DETERMINANTS OF HEALTH (SDOH)
WITHIN THE LAST YEAR, HAVE YOU BEEN HUMILIATED OR EMOTIONALLY ABUSED IN OTHER WAYS BY YOUR PARTNER OR EX-PARTNER?: NO
IN THE PAST 12 MONTHS, HAS THE ELECTRIC, GAS, OIL, OR WATER COMPANY THREATENED TO SHUT OFF SERVICE IN YOUR HOME?: NO
WITHIN THE PAST 12 MONTHS, YOU WORRIED THAT YOUR FOOD WOULD RUN OUT BEFORE YOU GOT THE MONEY TO BUY MORE: NEVER TRUE
WITHIN THE LAST YEAR, HAVE YOU BEEN KICKED, HIT, SLAPPED, OR OTHERWISE PHYSICALLY HURT BY YOUR PARTNER OR EX-PARTNER?: NO
WITHIN THE LAST YEAR, HAVE TO BEEN RAPED OR FORCED TO HAVE ANY KIND OF SEXUAL ACTIVITY BY YOUR PARTNER OR EX-PARTNER?: NO
WITHIN THE LAST YEAR, HAVE YOU BEEN AFRAID OF YOUR PARTNER OR EX-PARTNER?: NO
WITHIN THE PAST 12 MONTHS, THE FOOD YOU BOUGHT JUST DIDN'T LAST AND YOU DIDN'T HAVE MONEY TO GET MORE: NEVER TRUE

## 2025-03-09 ASSESSMENT — FIBROSIS 4 INDEX
FIB4 SCORE: 0.7
FIB4 SCORE: 0.7

## 2025-03-09 ASSESSMENT — PAIN DESCRIPTION - PAIN TYPE
TYPE: ACUTE PAIN

## 2025-03-09 NOTE — CARE PLAN
The patient is Stable - Low risk of patient condition declining or worsening    Shift Goals  Clinical Goals: stress test, swallow study, ECHO  Patient Goals: rest  Family Goals: not present    Progress made toward(s) clinical / shift goals:    Problem: Knowledge Deficit - Standard  Goal: Patient and family/care givers will demonstrate understanding of plan of care, disease process/condition, diagnostic tests and medications  Outcome: Progressing     Problem: Mobility  Goal: Patient's capacity to carry out activities will improve  Outcome: Progressing     Problem: Self Care  Goal: Patient will have the ability to perform ADLs independently or with assistance (bathe, groom, dress, toilet and feed)  Outcome: Progressing       Patient is not progressing towards the following goals:

## 2025-03-09 NOTE — PROGRESS NOTES
4 Eyes Skin Assessment Completed by LEWIS Baker and LEWIS Krishnan.    Head WDL  Ears WDL  Nose WDL  Mouth WDL  Neck WDL  Breast/Chest WDL  Shoulder Blades WDL  Spine WDL, skin tag  (R) Arm/Elbow/Hand Blanching, wrist lump    (L) Arm/Elbow/Hand Blanching  Abdomen hernia    Groin WDL  Scrotum/Coccyx/Buttocks WDL  (R) Leg dry, discoloration  (L) Leg dry, discoloration    (R) Heel/Foot/Toe dry, cracking heels, discoloration, toes callus     (L) Heel/Foot/Toe dry, cracking heels, discoloration, toes callus             Devices In Places Tele Box, Blood Pressure Cuff, and Pulse Ox      Interventions In Place Pillows    Possible Skin Injury No    Pictures Uploaded Into Epic Yes  Wound Consult Placed Yes  RN Wound Prevention Protocol Ordered No

## 2025-03-09 NOTE — ED TRIAGE NOTES
"Chief Complaint   Patient presents with    Chest Pain     Transfer from [place] for CP and + trops.  Per [facility] pt was hypotensive prior to transfer, received calcium gluc and some IVF, pressures have stabilized at this time. Pt ambulatory with steady gait, GCS 15, VSS     Pt ambulatory to BR independent with steady gait.  Pt connected to monitor, EKG performed.   Pt resting comfortably at this time.     /60   Pulse (!) 52   Resp (!) 29   Ht 1.727 m (5' 8\")   Wt 87.5 kg (192 lb 14.4 oz)   SpO2 94%   BMI 29.33 kg/m²     "

## 2025-03-09 NOTE — PROGRESS NOTES
Hospital Medicine Daily Progress Note    Date of Service  3/9/2025    Chief Complaint  Venkata David is a 69 y.o. male admitted 3/8/2025 with chest pain.    Hospital Course  Venkata David is a 69 y.o. male who presented 3/8/2025 with past medical history of atrial fibrillation, hypertension, history of systolic heart failure with improved EF who presents to the hospital for chest pain.  The chest pain is all around his chest and is nonradiating or positional.  He complains of exertional chest pain.the patient does complain of difficulty swallowing where solids will get stuck.  The patient was transferred from [facility].  He was noted to be bradycardic and hypotensive.  Patient was given IV calcium and IV fluids at the outlying facility.     The patient states that for the past few weeks he was double dosing and all of his medications.  This was not prescribed by his cardiologist.  Last year he was switched to long-acting Cardizem.     EKG interpreted by me found sinus bradycardia     On the heart monitor patient was having episodes of junctional bradycardia with sinus bradycardia.    Interval Problem Update  3/9 afebrile, vitals somewhat stable.  Still having bradycardia high 40s to high 50s, slightly tachypneic 22-25 respirations. Patient tells me his heart rate has always been low.  He is on room air. His main concern is his swallowing.  He has lost weight due to difficulty swallowing.  He DOES have an appetite, but not able to get food down very well. Stress test pending. Echo pending.  Barium swallow pending.     I have discussed this patient's plan of care and discharge plan at IDT rounds today with Case Management, Nursing, Nursing leadership, and other members of the IDT team.    Consultants/Specialty  None    Code Status  Full Code    Disposition  The patient is not medically cleared for discharge to home or a post-acute facility.  Anticipate discharge to: home with close outpatient  follow-up    I have placed the appropriate orders for post-discharge needs.    Review of Systems  Review of Systems   Constitutional:  Positive for malaise/fatigue. Negative for chills and fever.   HENT:          Difficulty swallowing   Respiratory:  Negative for cough and shortness of breath.    Cardiovascular:  Positive for chest pain. Negative for palpitations and leg swelling.   Gastrointestinal:  Negative for abdominal pain, diarrhea, heartburn, nausea and vomiting.   Genitourinary:  Negative for dysuria, frequency and urgency.   Neurological:  Negative for dizziness and headaches.   All other systems reviewed and are negative.       Physical Exam  Temp:  [36.4 °C (97.5 °F)] 36.4 °C (97.5 °F)  Pulse:  [47-64] 64  Resp:  [16-35] 16  BP: (104-134)/(49-60) 115/58  SpO2:  [93 %-94 %] 94 %    Physical Exam  Vitals and nursing note reviewed.   Constitutional:       Appearance: Normal appearance. He is not ill-appearing.   HENT:      Head: Normocephalic and atraumatic.      Jaw: There is normal jaw occlusion.      Right Ear: Hearing normal.      Left Ear: Hearing normal.      Nose: Nose normal.      Mouth/Throat:      Lips: Pink.      Mouth: Mucous membranes are moist.   Eyes:      Extraocular Movements: Extraocular movements intact.      Conjunctiva/sclera: Conjunctivae normal.      Pupils: Pupils are equal, round, and reactive to light.   Neck:      Vascular: No carotid bruit.   Cardiovascular:      Rate and Rhythm: Normal rate and regular rhythm.      Pulses: Normal pulses.      Heart sounds: Normal heart sounds, S1 normal and S2 normal.   Pulmonary:      Effort: Pulmonary effort is normal.      Breath sounds: Normal breath sounds and air entry. No stridor.   Musculoskeletal:      Cervical back: Normal range of motion and neck supple.      Right lower leg: Edema present.      Left lower leg: Edema present.   Feet:      Right foot:      Skin integrity: Erythema, callus and dry skin present.      Left foot:      Skin  integrity: Erythema, callus and dry skin present.   Skin:     General: Skin is warm and dry.      Capillary Refill: Capillary refill takes less than 2 seconds.   Neurological:      General: No focal deficit present.      Mental Status: He is alert and oriented to person, place, and time. Mental status is at baseline.      Sensory: Sensation is intact.      Motor: Motor function is intact.   Psychiatric:         Attention and Perception: Attention and perception normal.         Mood and Affect: Mood and affect normal.         Speech: Speech normal.         Behavior: Behavior normal. Behavior is cooperative.         Fluids    Intake/Output Summary (Last 24 hours) at 3/9/2025 0805  Last data filed at 3/9/2025 0428  Gross per 24 hour   Intake --   Output 250 ml   Net -250 ml        Laboratory  Recent Labs     03/09/25  0004   WBC 7.2   RBC 4.89   HEMOGLOBIN 13.5*   HEMATOCRIT 41.6*   MCV 85.1   MCH 27.6   MCHC 32.5   RDW 47.8   PLATELETCT 440   MPV 9.1     Recent Labs     03/09/25  0004   SODIUM 138   POTASSIUM 5.1   CHLORIDE 107   CO2 19*   GLUCOSE 119*   BUN 54*   CREATININE 1.56*   CALCIUM 9.0                   Imaging  DX-CHEST-PORTABLE (1 VIEW)   Final Result         1.  Hazy right infrahilar density suggesting subtle infiltrate   2.  Prominence of the right mediastinal contour, visible on multiple prior studies dating to August 27, 2024. Correlate with history. Follow-up evaluation with CT chest for further characterization as clinically appropriate   3.  Cardiomegaly      EC-ECHOCARDIOGRAM COMPLETE W/O CONT    (Results Pending)   DX-ESOPH. FLUORO (BARIUM SWALLOW)    (Results Pending)   NM-CARDIAC STRESS TEST    (Results Pending)        Assessment/Plan  * Acute renal failure (HCC)- (present on admission)  Assessment & Plan  Hold torsemide and Aldactone  Low-dose IV fluids  Blood pressure has improved  Monitor BMP and assess response  Avoid IV contrast/nephrotoxins/NSAIDs  Dose adjust meds for decreased  GFR      Chest pain  Assessment & Plan  Rule out ACS  Initial EKG and troponin negative for ischemia  Continuous cardiac monitoring with serial EKG and troponin  Nuclear medicine cardiac stress test   aspirin  Check lipid panel, TSH and hemoglobin A1c  Nitro when necessary for chest pain        Junctional bradycardia  Assessment & Plan  Monitor blood pressure  Atropine if heart rate below 30  Cardiac echo has been ordered    Paroxysmal atrial fibrillation (HCC)- (present on admission)  Assessment & Plan  Patient was double dosing on his medications  I will hold his medications for tonight since he is having episodes of junctional bradycardia    Dysphagia  Assessment & Plan  I have ordered a barium swallow study         VTE prophylaxis:   SCDs/TEDs      I have performed a physical exam and reviewed and updated ROS and Plan today (3/9/2025). In review of yesterday's note (3/8/2025), there are no changes except as documented above.

## 2025-03-09 NOTE — DISCHARGE PLANNING
RN THANIA met with patient at bedside to complete assessment.   Pleasantly A&Ox4 and able to verify information on face sheet.   Lives in single floor, multi-unit home in [facility].   Independent with ADLs and IADLs at baseline.   Does not own or use DME at baseline, including oxygen.   Owns vehicle and drives at baseline.   He has minor support from friends in [facility]. Reports his friend will drive him home upon discharge.   PCP is Collins Pro MD in [facility].   Has Medicare A/B and reports having VA benefits (PFA notified as it is not currently reflected on face sheet).   Denies current concerns with ETOH or illicit substances.   Reports remote history of depression which he received therapy for in the 80s. Denies current thoughts of harming himself or others.   Pending medical clearance.   Anticipating no case management needs prior to discharge at this time.    Case Management Discharge Planning    Admission Date: 3/8/2025  GMLOS:    ALOS: 0    6-Clicks ADL Score: 24  6-Clicks Mobility Score: 18    Anticipated Discharge Dispo: Discharge Disposition: Discharged to home/self care (01)  Discharge Address: [address] [place] [address], [address], [facility] [address]  Discharge Contact Phone Number: [phone]    DME Needed: No    Action(s) Taken: Updated Provider/Nurse on Discharge Plan, Patient Conference, and DC Assessment Complete (See below)    Escalations Completed: None    Medically Clear: No    Next Steps: Continue plan of care discussion with IDT.     Barriers to Discharge: Medical clearance    Is the patient up for discharge tomorrow: Unknown.     Care Transition Team Assessment    Information Source  Orientation Level: Oriented X4  Information Given By: Patient  Informant's Name: Venkata  Who is responsible for making decisions for patient? : Patient    Readmission Evaluation  Is this a readmission?: No    Elopement Risk  Legal Hold: No  Ambulatory or Self Mobile in Wheelchair: No-Not an Elopement  Risk    Interdisciplinary Discharge Planning  Does Admitting Nurse Feel This Could be a Complex Discharge?: No  Primary Care Physician: Collins Pro MD in [facility]  Lives with - Patient's Self Care Capacity: Unrelated Adult, Friends, Other (Comments) (per RN note describes group home with private room)  Patient or legal guardian wants to designate a caregiver: No  Support Systems: None, Friends / Neighbors  Housing / Facility: 1 Story House  Name of Care Facility:  (pt cannot recall facility name)  Prior Services: None    Discharge Preparedness  What is your plan after discharge?: Home with help  What are your discharge supports?: Other (comment) (Friend)  Prior Functional Level: Ambulatory, Drives Self, Independent with Activities of Daily Living, Independent with Medication Management  Difficulity with ADLs: None  Difficulity with IADLs: None    Functional Assesment  Prior Functional Level: Ambulatory, Drives Self, Independent with Activities of Daily Living, Independent with Medication Management    Finances  Financial Barriers to Discharge: No  Prescription Coverage: Yes    Vision / Hearing Impairment  Vision Impairment : Yes  Right Eye Vision: Impaired, Wears Glasses  Left Eye Vision: Impaired, Wears Glasses  Hearing Impairment : No    Values / Beliefs / Concerns  Values / Beliefs Concerns : No    Advance Directive  Advance Directive?: None  Advance Directive offered?: AD Booklet refused    Domestic Abuse  Have you ever been the victim of abuse or violence?: No  Possible Abuse/Neglect Reported to:: Not Applicable    Psychological Assessment  History of Substance Abuse: None  History of Psychiatric Problems: No  Non-compliant with Treatment: No  Newly Diagnosed Illness: Yes    Discharge Risks or Barriers  Discharge risks or barriers?: No  Patient risk factors: Other (comment) (None Identified.)    Anticipated Discharge Information  Discharge Disposition: Discharged to home/self care (01)  Discharge Address: [address]  [facility], [address], [facility] [address]  Discharge Contact Phone Number: [phone]

## 2025-03-09 NOTE — WOUND TEAM
[facility]  Inpatient Services  Wound and Skin Care Brief Evaluation    Admission Date: 3/8/2025     Last order of IP CONSULT TO WOUND CARE was found on 3/9/2025 from Hospital Encounter on 3/8/2025     HPI, PMH, SH: Reviewed    Chief Complaint   Patient presents with    Chest Pain     Transfer from [facility] for CP and + trops.  Per [facility] pt was hypotensive prior to transfer, received calcium gluc and some IVF, pressures have stabilized at this time. Pt ambulatory with steady gait, GCS 15, VSS     Diagnosis: Acute renal failure (HCC) [N17.9]    Unit where seen by Wound Team: T702/01     Wound consult placed regarding feet. Chart and images reviewed. This clinician in to assess patient. Patient pleasant and agreeable. Non-selectively debrided with No rinse foam soap and Moist warm washcloth.   Patient with BLE dryness. Amlactin lotion ordered to assist with softening calluses.   No pressure injuries or advanced wound care needs identified. Wound consult completed. No further follow up unless indicated and consulted.          PREVENTATIVE INTERVENTIONS:    Q shift Claudio - performed per nursing policy  Q shift pressure point assessments - performed per nursing policy    Surface/Positioning  Standard/trauma mattress - Currently in Place    Offloading/Redistribution  Heel offloading dressing (Silicone dressing) - Currently in Place

## 2025-03-09 NOTE — THERAPY
"Speech Language Pathology   Clinical Swallow Evaluation     Patient Name: Venkata David  AGE:  69 y.o., SEX:  male  Medical Record #: [id number]  Date of Service: 3/9/2025      History of Present Illness  \"69 y.o. male who presented 3/8/2025 who presents to the hospital for chest pain. The chest pain is all around his chest and is nonradiating or positional. He complains of exertional chest pain.the patient does complain of difficulty swallowing where solids will get stuck. The patient was transferred from [facility]. He was noted to be bradycardic and hypotensive. \"      PMHx:\"with past medical history of atrial fibrillation, hypertension, history of systolic heart failure with improved EF \"    CXR 3/8-\"1.  Hazy right infrahilar density suggesting subtle infiltrate  2.  Prominence of the right mediastinal contour, visible on multiple prior studies dating to August 27, 2024. Correlate with history. Follow-up evaluation with CT chest for further characterization as clinically appropriate  3.  Cardiomegaly\"      General Information:  Vitals  O2 Delivery Device: None - Room Air  Level of Consciousness: Alert, Awake     Orientation: Oriented x 4  Follows Directives: Yes      Prior Living Situation & Level of Function:  Prior Services: None  Housing / Facility: 1 Story House  Lives with - Patient's Self Care Capacity: Unrelated Adult, Friends, Other (Comments) (per RN note describes group home with private room)     Communication: WFL  Swallowing: Patient with progressively worsening swallow.  Reports dilation of esophagus when he was 26 y/o.  States his cardiologist has wanted him to have it looked at for a while, but he is finally now agreeable.       Oral Mechanism Evaluation:  Dentition: Fair, Natural dentition, Some missing dentition   Facial Symmetry: Equal  Facial Sensation: Equal     Labial Observations: WFL   Lingual Observations: Midline  Motor Speech: WFL       Laryngeal Function:  Secretion " "Management: Adequate  Voice Quality: WFL        Cough: Perceptually WNL     Subjective  Patient is alert.  He is concerned about eating certain foods, like pancakes that he has on his breakfast tray.  Reports many foods that cause him problems including meat, dry foods, breads and sometimes liquids if he \"gets backed up\".      Assessment  Current Method of Nutrition: Oral diet (Cardiac no decaf/no caffeine for test)  Positioning: Sitting EOB  Bolus Administration: Patient    O2 Delivery Device: None - Room Air  Factor(s) Affecting Performance: None  Tracheostomy : No       Swallowing Trials:  Swallowing Trials  Ice: WFL  Thin Liquid (TN0): WFL  Liquidised (LQ3): WFL  Soft & Bite Sized (SB6): Impaired  Regular (RG7): Impaired      Comments: Patient was alert and sat up at edge of bed for meal.  He states he is scared of pancakes.  He ate puree well. He was also nervous to eat canned pineapple.  With SLP encouragement, he took small 1 piece bites of pineapple.  He required liquid wash and stated that it did not feel like it was getting stuck at this time, but was worried it would worsen.  He took x4 very small (approx less than 1/4 inch pieces) of pancake soaked in syrup.  He masticated well and took a liquid wash.  He states that he feels like food backs up and points to mid sternum and below.  He gave a very descriptive explanation of what typically results including thick foamy secretion return and needing to wait it out in order to take more by mouth.  This was not observed at this time.        Clinical Impressions  Patient presents with suspected esophageal phase deficit.  No overt pharyngeal phase deficits noted.  He c/o food backing up mid-sternally and below.  Recommend consideration for further evaluation of esophagus and downgrade to minced moist which appears in line with foods he is comfortable eating.  Speech therapy will follow for dysphagia tx.     Recommendations  Diet Consistency: Minced " "moist/Thin  Instrumentation: Instrumental swallow study pending clinical progress  Medication: As tolerated  Supervision: Independent  Positioning: Fully upright and midline during oral intake, Remain upright for 45 minutes after oral intake, Meals sitting upright in a chair, as tolerated  Risk Management : Small bites/sips, Alternate bites and sips, Slow rate of intake, Physical mobility, as tolerated  Oral Care: BID  Consult Referral(s): Gastroenterologist (pt w/c/o that appear esophageal in nature and has fear of eating certain foods, which he reports does fluctuate, but that generally he avoids meat that is not ground or very easy to chew and dry foods like pancakes because they \"choke\" him.)      SLP Treatment Plan  Treatment Plan: Dysphagia Treatment, Patient/Family/Caregiver Training  SLP Frequency: 3x Per Week  Estimated Duration: Until Therapy Goals Met      Anticipated Discharge Needs  Discharge Recommendations: Anticipate that the patient will have no further speech therapy needs after discharge from the hospital   Therapy Recommendations Upon DC: Dysphagia Training, Patient / Family / Caregiver Education        Patient / Family Goals  Patient / Family Goal #1: \"I want to be able to eat normal foods again\"  Short Term Goals  Short Term Goal # 1: Patient will consume minced moist with thin liquids without overt s/s of aspiration or globus sensation.      Felicita Loyd, SLP   "

## 2025-03-09 NOTE — ASSESSMENT & PLAN NOTE
Esophogram reveals Dilated esophagus at the thoracic inlet, filled with contrast and retained oral secretions, with antegrade passage into the stomach. The stomach is located in the mediastinum, to the right of midline, likely secondary to either a congenital right   diaphragmatic hernia or acquired right diaphragmatic hernia.  3/10 Discussed with gen surgery- Dr Swain, recommend CT abd/pelvis.  Also, gen surgery does not handle these type of issues would need to be a special surgeon (Sasse, Ganser, Gerber).  May be able to get done inpatient pending other work up.

## 2025-03-09 NOTE — ASSESSMENT & PLAN NOTE
Hold torsemide and Aldactone  Low-dose IV fluids  Blood pressure has improved  Monitor BMP and assess response  Avoid IV contrast/nephrotoxins/NSAIDs  Dose adjust meds for decreased GFR

## 2025-03-09 NOTE — PROGRESS NOTES
Handoff report received from night shift nurse. Pt care assumed. Pt is currently resting in bed. POC discussed with Pt and Pt verbalizes no questions at this time. Pt is AAOx4, on room auir, on Tele monitoring, and VSS. Call light and belongings within reach, bed in lowest and locked position, and Pt educated on use of call light. Hourly rounding initiated.

## 2025-03-09 NOTE — HOSPITAL COURSE
Venkata David is a 69 y.o. male who presented 3/8/2025 with past medical history of atrial fibrillation, hypertension, history of systolic heart failure with improved EF who presents to the hospital for chest pain.  The chest pain is all around his chest and is nonradiating or positional.  He complains of exertional chest pain.the patient does complain of difficulty swallowing where solids will get stuck.  The patient was transferred from [facility].  He was noted to be bradycardic and hypotensive.  Patient was given IV calcium and IV fluids at the outlying facility.     The patient states that for the past few weeks he was double dosing and all of his medications.  This was not prescribed by his cardiologist.  Last year he was switched to long-acting Cardizem.  EKG found sinus bradycardia.  On the heart monitor patient was having episodes of junctional bradycardia with sinus bradycardia.

## 2025-03-09 NOTE — CARE PLAN
The patient is Stable - Low risk of patient condition declining or worsening    Shift Goals  Clinical Goals: monitor toponins, cardiology consult  Patient Goals: see SLP, medication management  Family Goals: not present    Progress made toward(s) clinical / shift goals:    Problem: Knowledge Deficit - Standard  Goal: Patient and family/care givers will demonstrate understanding of plan of care, disease process/condition, diagnostic tests and medications  Outcome: Progressing     Problem: Communication  Goal: The ability to communicate needs accurately and effectively will improve  Outcome: Progressing     Problem: Hemodynamics  Goal: Patient's hemodynamics, fluid balance and neurologic status will be stable or improve  Outcome: Progressing     Problem: Mobility  Goal: Patient's capacity to carry out activities will improve  Outcome: Progressing     Problem: Self Care  Goal: Patient will have the ability to perform ADLs independently or with assistance (bathe, groom, dress, toilet and feed)  Outcome: Progressing

## 2025-03-09 NOTE — H&P
Hospital Medicine History & Physical Note    Date of Service  3/9/2025    Primary Care Physician  Pcp Pt States None    Consultants      Specialist Names:     Code Status  Full Code    Chief Complaint  Chief Complaint   Patient presents with    Chest Pain     Transfer from [facility] for CP and + trops.  Per diallosa pt was hypotensive prior to transfer, received calcium gluc and some IVF, pressures have stabilized at this time. Pt ambulatory with steady gait, GCS 15, VSS       History of Presenting Illness  Venkata David is a 69 y.o. male who presented 3/8/2025 with past medical history of atrial fibrillation, hypertension, history of systolic heart failure with improved EF who presents to the hospital for chest pain.  The chest pain is all around his chest and is nonradiating or positional.  He complains of exertional chest pain.the patient does complain of difficulty swallowing where solids will get stuck.  The patient was transferred from [facility].  He was noted to be bradycardic and hypotensive.  Patient was given IV calcium and IV fluids at the outlying facility.    The patient states that for the past few weeks he was double dosing and all of his medications.  This was not prescribed by his cardiologist.  Last year he was switched to long-acting Cardizem.    EKG interpreted by me found sinus bradycardia    On the heart monitor patient was having episodes of junctional bradycardia with sinus bradycardia.    I discussed the plan of care with patient.    Review of Systems  Review of Systems   Constitutional:  Negative for chills, diaphoresis, fever and malaise/fatigue.   HENT:  Negative for congestion, ear discharge, ear pain, hearing loss, nosebleeds, sinus pain, sore throat and tinnitus.    Eyes:  Negative for blurred vision, double vision, photophobia and pain.   Respiratory:  Negative for cough, hemoptysis, sputum production, shortness of breath, wheezing and stridor.    Cardiovascular:  Positive  for chest pain and leg swelling. Negative for palpitations, orthopnea, claudication and PND.   Gastrointestinal:  Negative for abdominal pain, blood in stool, constipation, diarrhea, heartburn, melena, nausea and vomiting.   Genitourinary:  Negative for dysuria, flank pain, frequency, hematuria and urgency.   Musculoskeletal:  Negative for back pain, falls, joint pain, myalgias and neck pain.   Skin:  Negative for itching and rash.   Neurological:  Negative for dizziness, tingling, tremors, weakness and headaches.   Endo/Heme/Allergies:  Negative for environmental allergies and polydipsia. Does not bruise/bleed easily.   Psychiatric/Behavioral:  Negative for depression, hallucinations, substance abuse and suicidal ideas.        Past Medical History   has a past medical history of Bowel habit changes, CHF (congestive heart failure) ([facility]) (12/2017), Chronic anticoagulation, Disorder of thyroid, Hepatitis C (1980), Hyperglycemia, Hyperlipidemia, Hypertension, Indigestion, Obesity, Paroxysmal atrial fibrillation ([facility]) (01/2025), Psychiatric problem, Renal disorder, and Urinary bladder disorder.    Surgical History   has a past surgical history that includes inguinal hernia repair child (as child) and trans urethral resection prostate (12/8/2017).     Family History  Family history reviewed with patient. There is no family history that is pertinent to the chief complaint.     Social History   reports that he quit smoking about 6 years ago. His smoking use included cigarettes. He has never used smokeless tobacco. He reports that he does not drink alcohol and does not use drugs.    Allergies  No Known Allergies    Medications  Prior to Admission Medications   Prescriptions Last Dose Informant Patient Reported? Taking?   Glucosamine-Chondroitin (GLUCOSAMINE CHONDR COMPLEX PO) 3/8/2025 Morning Patient Yes Yes   Sig: Take  by mouth every day.   Multiple Vitamins-Minerals (CENTRUM ADULTS PO) 3/8/2025 Morning Patient Yes Yes    Sig: Take  by mouth every day.   aspirin (ASA) 81 MG Chew Tab chewable tablet 3/8/2025 Morning Patient, Rx Bottle (For Med Information) No Yes   Sig: Chew 1 Tablet every day.   Patient taking differently: Chew 81 mg 2 times a day.   atorvastatin (LIPITOR) 20 MG Tab 3/8/2025 Morning Patient, Rx Bottle (For Med Information) No Yes   Sig: Take 1 Tablet by mouth every day.   Patient taking differently: Take 20 mg by mouth 2 times a day.   dilTIAZem CD (CARDIZEM CD) 240 MG CAPSULE SR 24 HR 3/8/2025 Morning Patient, Rx Bottle (For Med Information) No Yes   Sig: Take 1 Capsule by mouth every day.   Patient taking differently: Take 240 mg by mouth 2 times a day.   flecainide (TAMBOCOR) 50 MG tablet 3/8/2025 Morning Patient, Rx Bottle (For Med Information) No Yes   Sig: Take 1 Tablet by mouth 2 times a day.   spironolactone (ALDACTONE) 50 MG Tab 3/8/2025 Morning Patient, Rx Bottle (For Med Information) No Yes   Sig: Take 1 Tablet by mouth every day.   torsemide (DEMADEX) 20 MG Tab 3/8/2025 Morning Patient, Rx Bottle (For Med Information) No Yes   Sig: Take 1 Tablet by mouth every day.      Facility-Administered Medications: None       Physical Exam  Temp:  [36.4 °C (97.5 °F)] 36.4 °C (97.5 °F)  Pulse:  [48-59] 59  Resp:  [22-35] 25  BP: (104-134)/(49-60) 116/59  SpO2:  [93 %-94 %] 93 %  Blood Pressure : 116/59   Temperature: 36.4 °C (97.5 °F)   Pulse: (!) 59   Respiration: (!) 25   Pulse Oximetry: 93 %       Physical Exam  Vitals and nursing note reviewed.   Constitutional:       General: He is not in acute distress.     Appearance: Normal appearance. He is not ill-appearing, toxic-appearing or diaphoretic.   HENT:      Head: Normocephalic and atraumatic.      Nose: No congestion or rhinorrhea.      Mouth/Throat:      Pharynx: No posterior oropharyngeal erythema.   Eyes:      General: No scleral icterus.        Right eye: No discharge.   Cardiovascular:      Rate and Rhythm: Regular rhythm. Bradycardia present.       Pulses: Normal pulses.      Heart sounds: Normal heart sounds. No murmur heard.     No friction rub. No gallop.   Pulmonary:      Effort: Pulmonary effort is normal. No respiratory distress.      Breath sounds: Normal breath sounds. No stridor. No wheezing, rhonchi or rales.   Abdominal:      General: There is no distension.      Tenderness: There is no abdominal tenderness.   Musculoskeletal:         General: No swelling, tenderness, deformity or signs of injury.      Cervical back: Normal range of motion.      Right lower leg: Edema present.      Left lower leg: Edema present.   Skin:     Coloration: Skin is not jaundiced or pale.      Findings: No bruising, erythema, lesion or rash.   Neurological:      General: No focal deficit present.      Mental Status: He is alert and oriented to person, place, and time.         Laboratory:  Recent Labs     03/09/25  0004   WBC 7.2   RBC 4.89   HEMOGLOBIN 13.5*   HEMATOCRIT 41.6*   MCV 85.1   MCH 27.6   MCHC 32.5   RDW 47.8   PLATELETCT 440   MPV 9.1     Recent Labs     03/09/25  0004   SODIUM 138   POTASSIUM 5.1   CHLORIDE 107   CO2 19*   GLUCOSE 119*   BUN 54*   CREATININE 1.56*   CALCIUM 9.0     Recent Labs     03/09/25  0004   ALTSGPT 9   ASTSGOT 13   ALKPHOSPHAT 137*   TBILIRUBIN <0.2   GLUCOSE 119*         Recent Labs     03/09/25  0004   NTPROBNP 439*         Recent Labs     03/09/25  0004   TROPONINT 21*       Imaging:  DX-CHEST-PORTABLE (1 VIEW)   Final Result         1.  Hazy right infrahilar density suggesting subtle infiltrate   2.  Prominence of the right mediastinal contour, visible on multiple prior studies dating to August 27, 2024. Correlate with history. Follow-up evaluation with CT chest for further characterization as clinically appropriate   3.  Cardiomegaly      EC-ECHOCARDIOGRAM COMPLETE W/O CONT    (Results Pending)   DX-ESOPH. FLUORO (BARIUM SWALLOW)    (Results Pending)   NM-CARDIAC STRESS TEST    (Results Pending)       X-Ray:  I have personally  reviewed the images and compared with prior images.  EKG:  I have personally reviewed the images and compared with prior images.    Assessment/Plan:  Justification for Admission Status  I anticipate this patient will require at least two midnights for appropriate medical management, necessitating inpatient admission because renal failure    Patient will need a Telemetry bed on MEDICAL service .  The need is secondary to renal failure.    * Acute renal failure (HCC)- (present on admission)  Assessment & Plan  Hold torsemide and Aldactone  Low-dose IV fluids  Blood pressure has improved  Monitor BMP and assess response  Avoid IV contrast/nephrotoxins/NSAIDs  Dose adjust meds for decreased GFR      Chest pain  Assessment & Plan  Rule out ACS  Initial EKG and troponin negative for ischemia  Continuous cardiac monitoring with serial EKG and troponin  Nuclear medicine cardiac stress test   aspirin  Check lipid panel, TSH and hemoglobin A1c  Nitro when necessary for chest pain        Dysphagia  Assessment & Plan  I have ordered a barium swallow study    Junctional bradycardia  Assessment & Plan  Monitor blood pressure  Atropine if heart rate below 30  Cardiac echo has been ordered    Paroxysmal atrial fibrillation (HCC)- (present on admission)  Assessment & Plan  Patient was double dosing on his medications  I will hold his medications for tonight since he is having episodes of junctional bradycardia        VTE prophylaxis: SCDs/TEDs

## 2025-03-09 NOTE — ED NOTES
Med Rec completed per patient with med bottles at bedside. Returned     Allergies reviewed: yes    Antibiotics in the past 30 days: no    Anticoagulant in past 14 days: no  Patient takes aspirin 81 mg BID Last Dose 03/08/2025 am     Pharmacy patient utilizes: [facility]  [phone]    Per patient, for the past month or so he has been taking his Atorvastatin, Aspirin, and Diltiazem twice daily when they are only prescribed once daily. He he would start feeling bad in the afternoon/ evening and take his meds again.

## 2025-03-09 NOTE — PROGRESS NOTES
Monitor notified RN pt converting from SB to junctional rhythm, rate 68. Hospitalist at bedside, hospitalist notified.

## 2025-03-09 NOTE — ASSESSMENT & PLAN NOTE
Rule out ACS  Initial EKG and troponin negative for ischemia  Continuous cardiac monitoring with serial EKG and troponin  Nuclear medicine cardiac stress test   aspirin  Check lipid panel, TSH and hemoglobin A1c  Nitro when necessary for chest pain

## 2025-03-09 NOTE — ED PROVIDER NOTES
ED Provider Note    CHIEF COMPLAINT  Chief Complaint   Patient presents with    Chest Pain     Transfer from [facility] for CP and + trops.  Per luciana pt was hypotensive prior to transfer, received calcium gluc and some IVF, pressures have stabilized at this time. Pt ambulatory with steady gait, GCS 15, VSS       EXTERNAL RECORDS REVIEWED  External ED Note [facility] records reviewed    HPI/ROS  LIMITATION TO HISTORY   Select: : None  OUTSIDE HISTORIAN(S):  None    Venkata David is a 69 y.o. male who presents to the [facility] primary's a transfer from [facility] where he presented with chest pain earlier today.  Had onset of chest pressure and dyspnea.  Sent to this facility secondary to increased troponin and low blood pressure.  Currently feeling better.    PAST MEDICAL HISTORY   has a past medical history of Bowel habit changes, CHF (congestive heart failure) ([facility]) (2017), Chronic anticoagulation, Disorder of thyroid, Hepatitis C (), Hyperglycemia, Hyperlipidemia, Hypertension, Indigestion, Obesity, Paroxysmal atrial fibrillation (HCC) (2025), Psychiatric problem, Renal disorder, and Urinary bladder disorder.    SURGICAL HISTORY   has a past surgical history that includes inguinal hernia repair child (as child) and trans urethral resection prostate (2017).    FAMILY HISTORY  No family history on file.    SOCIAL HISTORY  Social History     Tobacco Use    Smoking status: Former     Current packs/day: 0.00     Types: Cigarettes     Quit date: 2018     Years since quittin.7    Smokeless tobacco: Never   Vaping Use    Vaping status: Never Used   Substance and Sexual Activity    Alcohol use: No    Drug use: No    Sexual activity: Not on file       CURRENT MEDICATIONS  Home Medications       Reviewed by Juanis Milner R.N. (Registered Nurse) on 25 at 2336  Med List Status: Not Addressed     Medication Last Dose Status   aspirin (ASA) 81 MG Chew Tab chewable tablet  Active   atorvastatin  "(LIPITOR) 20 MG Tab  Active   dilTIAZem CD (CARDIZEM CD) 240 MG CAPSULE SR 24 HR  Active   flecainide (TAMBOCOR) 50 MG tablet  Active   Glucosamine-Chondroitin (GLUCOSAMINE CHONDR COMPLEX PO)  Active   Multiple Vitamins-Minerals (CENTRUM ADULTS PO)  Active   spironolactone (ALDACTONE) 50 MG Tab  Active   torsemide (DEMADEX) 20 MG Tab  Active                  Audit from Redirected Encounters    **Home medications have not yet been reviewed for this encounter**         ALLERGIES  No Known Allergies    PHYSICAL EXAM  VITAL SIGNS: /60   Pulse (!) 52   Resp (!) 29   Ht 1.727 m (5' 8\")   Wt 87.5 kg (192 lb 14.4 oz)   SpO2 94%   BMI 29.33 kg/m²      Pulse ox interpretation: I interpret this pulse ox as normal.  Constitutional: Alert in no apparent distress.  HENT: No signs of trauma, Bilateral external ears normal, Nose normal.   Eyes: Pupils are equal and reactive  Neck: Normal range of motion, No tenderness, Supple  Cardiovascular: Regular rate and rhythm, no murmurs.   Thorax & Lungs: Normal breath sounds, No respiratory distress  Skin: Warm, Dry, No erythema, No rash.   Musculoskeletal: Good range of motion in all major joints. No tenderness to palpation or major deformities noted.   Neurologic: Alert , Normal motor function, Normal sensory function, No focal deficits noted.   Psychiatric: Affect normal, Judgment normal, Mood normal.         EKG/LABS  Results for orders placed or performed during the hospital encounter of 03/08/25   CBC with Differential    Collection Time: 03/09/25 12:04 AM   Result Value Ref Range    WBC 7.2 4.8 - 10.8 K/uL    RBC 4.89 4.70 - 6.10 M/uL    Hemoglobin 13.5 (L) 14.0 - 18.0 g/dL    Hematocrit 41.6 (L) 42.0 - 52.0 %    MCV 85.1 81.4 - 97.8 fL    MCH 27.6 27.0 - 33.0 pg    MCHC 32.5 32.3 - 36.5 g/dL    RDW 47.8 35.9 - 50.0 fL    Platelet Count 440 164 - 446 K/uL    MPV 9.1 9.0 - 12.9 fL    Neutrophils-Polys 64.10 44.00 - 72.00 %    Lymphocytes 21.10 (L) 22.00 - 41.00 %    " Monocytes 11.30 0.00 - 13.40 %    Eosinophils 2.50 0.00 - 6.90 %    Basophils 0.40 0.00 - 1.80 %    Immature Granulocytes 0.60 0.00 - 0.90 %    Nucleated RBC 0.00 0.00 - 0.20 /100 WBC    Neutrophils (Absolute) 4.61 1.82 - 7.42 K/uL    Lymphs (Absolute) 1.52 1.00 - 4.80 K/uL    Monos (Absolute) 0.81 0.00 - 0.85 K/uL    Eos (Absolute) 0.18 0.00 - 0.51 K/uL    Baso (Absolute) 0.03 0.00 - 0.12 K/uL    Immature Granulocytes (abs) 0.04 0.00 - 0.11 K/uL    NRBC (Absolute) 0.00 K/uL   Complete Metabolic Panel (CMP)    Collection Time: 25 12:04 AM   Result Value Ref Range    Sodium 138 135 - 145 mmol/L    Potassium 5.1 3.6 - 5.5 mmol/L    Chloride 107 96 - 112 mmol/L    Co2 19 (L) 20 - 33 mmol/L    Anion Gap 12.0 7.0 - 16.0    Glucose 119 (H) 65 - 99 mg/dL    Bun 54 (H) 8 - 22 mg/dL    Creatinine 1.56 (H) 0.50 - 1.40 mg/dL    Calcium 9.0 8.5 - 10.5 mg/dL    Correct Calcium 9.6 8.5 - 10.5 mg/dL    AST(SGOT) 13 12 - 45 U/L    ALT(SGPT) 9 2 - 50 U/L    Alkaline Phosphatase 137 (H) 30 - 99 U/L    Total Bilirubin <0.2 0.1 - 1.5 mg/dL    Albumin 3.3 3.2 - 4.9 g/dL    Total Protein 6.4 6.0 - 8.2 g/dL    Globulin 3.1 1.9 - 3.5 g/dL    A-G Ratio 1.1 g/dL   proBrain Natriuretic Peptide, NT (BNP)    Collection Time: 25 12:04 AM   Result Value Ref Range    NT-proBNP 439 (H) 0 - 125 pg/mL   Troponins NOW    Collection Time: 25 12:04 AM   Result Value Ref Range    Troponin T 21 (H) 6 - 19 ng/L   ESTIMATED GFR    Collection Time: 25 12:04 AM   Result Value Ref Range    GFR (CKD-EPI) 47 (A) >60 mL/min/1.73 m 2   EKG    Collection Time: 25  1:00 AM   Result Value Ref Range    Report       [facility]    Test Date:  2025  Pt Name:    CESILIA LANGSTON                   Department: ER  MRN:        [id number]                      Room:       [facility]  Gender:     Male                         Technician: [id number]  :        1955                   Requested By:ER TRIAGE  PROTOCOL  Order #:    [id number]                    Reading MD: Tony Michaud    Measurements  Intervals                                Axis  Rate:       48                           P:          54  MN:         163                          QRS:        56  QRSD:       141                          T:          32  QT:         460  QTc:        411    Interpretive Statements  Sinus bradycardia  Right bundle branch block  Compared to ECG 06/14/2018 14:48:47  Sinus rhythm no longer present  Electronically Signed On 03- 01:00:47 PST by Tony Michaud         I have independently interpreted this EKG    RADIOLOGY/PROCEDURES   I have independently interpreted the diagnostic imaging associated with this visit and am waiting the final reading from the radiologist.   My preliminary interpretation is as follows: Cardiomegaly    Radiologist interpretation:  DX-CHEST-PORTABLE (1 VIEW)   Final Result         1.  Hazy right infrahilar density suggesting subtle infiltrate   2.  Prominence of the right mediastinal contour, visible on multiple prior studies dating to August 27, 2024. Correlate with history. Follow-up evaluation with CT chest for further characterization as clinically appropriate   3.  Cardiomegaly          COURSE & MEDICAL DECISION MAKING    ASSESSMENT, COURSE AND PLAN  Care Narrative: 69-year-old male presented to the ER as transfer from [facility].  Will repeat blood work and monitor.    DISPOSITION AND DISCUSSIONS  I have discussed management of the patient with the following physicians and KRISHNA's: Hospitalist at 0100    69-year-old presenting as transfer for further ACS evaluation.  Currently chest pain-free.  Repeat labs as above with slightly elevated troponin.  EKG as above and reviewed by myself.  Patient additionally on hematologic evaluation does show slight anemia as well as new PB.  At this point hospitalist agreeable to ongoing inpatient care and workup.      Heart score is moderate    FINAL  DIAGNOSIS  1. Chest pain, unspecified type    2. Elevated troponin    3. PB (acute kidney injury) (HCC)    4.  Chronic right bundle branch block     Electronically signed by: Tony Michaud M.D., 3/8/2025 11:49 PM

## 2025-03-09 NOTE — ASSESSMENT & PLAN NOTE
Patient was double dosing on his medications  I will hold his medications for tonight since he is having episodes of junctional bradycardia

## 2025-03-10 ENCOUNTER — APPOINTMENT (OUTPATIENT)
Dept: RADIOLOGY | Facility: MEDICAL CENTER | Age: 70
DRG: 683 | End: 2025-03-10
Payer: MEDICARE

## 2025-03-10 ENCOUNTER — APPOINTMENT (OUTPATIENT)
Dept: RADIOLOGY | Facility: MEDICAL CENTER | Age: 70
DRG: 683 | End: 2025-03-10
Attending: HOSPITALIST
Payer: MEDICARE

## 2025-03-10 LAB
ALBUMIN SERPL BCP-MCNC: 2.9 G/DL (ref 3.2–4.9)
ALBUMIN/GLOB SERPL: 0.9 G/DL
ALP SERPL-CCNC: 133 U/L (ref 30–99)
ALT SERPL-CCNC: 7 U/L (ref 2–50)
ANION GAP SERPL CALC-SCNC: 9 MMOL/L (ref 7–16)
AST SERPL-CCNC: 19 U/L (ref 12–45)
BASOPHILS # BLD AUTO: 0.7 % (ref 0–1.8)
BASOPHILS # BLD: 0.04 K/UL (ref 0–0.12)
BILIRUB SERPL-MCNC: 0.2 MG/DL (ref 0.1–1.5)
BUN SERPL-MCNC: 32 MG/DL (ref 8–22)
CALCIUM ALBUM COR SERPL-MCNC: 9.9 MG/DL (ref 8.5–10.5)
CALCIUM SERPL-MCNC: 9 MG/DL (ref 8.5–10.5)
CHLORIDE SERPL-SCNC: 110 MMOL/L (ref 96–112)
CHOLEST SERPL-MCNC: 85 MG/DL (ref 100–199)
CO2 SERPL-SCNC: 17 MMOL/L (ref 20–33)
CREAT SERPL-MCNC: 0.99 MG/DL (ref 0.5–1.4)
EOSINOPHIL # BLD AUTO: 0.23 K/UL (ref 0–0.51)
EOSINOPHIL NFR BLD: 3.8 % (ref 0–6.9)
ERYTHROCYTE [DISTWIDTH] IN BLOOD BY AUTOMATED COUNT: 47.9 FL (ref 35.9–50)
GFR SERPLBLD CREATININE-BSD FMLA CKD-EPI: 81 ML/MIN/1.73 M 2
GLOBULIN SER CALC-MCNC: 3.2 G/DL (ref 1.9–3.5)
GLUCOSE SERPL-MCNC: 109 MG/DL (ref 65–99)
HCT VFR BLD AUTO: 40.5 % (ref 42–52)
HDLC SERPL-MCNC: 25 MG/DL
HGB BLD-MCNC: 12.9 G/DL (ref 14–18)
IMM GRANULOCYTES # BLD AUTO: 0.04 K/UL (ref 0–0.11)
IMM GRANULOCYTES NFR BLD AUTO: 0.7 % (ref 0–0.9)
LDLC SERPL CALC-MCNC: 42 MG/DL
LYMPHOCYTES # BLD AUTO: 1.33 K/UL (ref 1–4.8)
LYMPHOCYTES NFR BLD: 22.1 % (ref 22–41)
MCH RBC QN AUTO: 27.2 PG (ref 27–33)
MCHC RBC AUTO-ENTMCNC: 31.9 G/DL (ref 32.3–36.5)
MCV RBC AUTO: 85.3 FL (ref 81.4–97.8)
MONOCYTES # BLD AUTO: 0.6 K/UL (ref 0–0.85)
MONOCYTES NFR BLD AUTO: 10 % (ref 0–13.4)
NEUTROPHILS # BLD AUTO: 3.79 K/UL (ref 1.82–7.42)
NEUTROPHILS NFR BLD: 62.7 % (ref 44–72)
NRBC # BLD AUTO: 0 K/UL
NRBC BLD-RTO: 0 /100 WBC (ref 0–0.2)
PLATELET # BLD AUTO: 357 K/UL (ref 164–446)
PMV BLD AUTO: 9 FL (ref 9–12.9)
POTASSIUM SERPL-SCNC: 5.1 MMOL/L (ref 3.6–5.5)
PROT SERPL-MCNC: 6.1 G/DL (ref 6–8.2)
RBC # BLD AUTO: 4.75 M/UL (ref 4.7–6.1)
SODIUM SERPL-SCNC: 136 MMOL/L (ref 135–145)
TRIGL SERPL-MCNC: 90 MG/DL (ref 0–149)
WBC # BLD AUTO: 6 K/UL (ref 4.8–10.8)

## 2025-03-10 PROCEDURE — 99233 SBSQ HOSP IP/OBS HIGH 50: CPT

## 2025-03-10 PROCEDURE — 74177 CT ABD & PELVIS W/CONTRAST: CPT

## 2025-03-10 PROCEDURE — 770020 HCHG ROOM/CARE - TELE (206)

## 2025-03-10 PROCEDURE — A9270 NON-COVERED ITEM OR SERVICE: HCPCS

## 2025-03-10 PROCEDURE — 80061 LIPID PANEL: CPT

## 2025-03-10 PROCEDURE — A9502 TC99M TETROFOSMIN: HCPCS

## 2025-03-10 PROCEDURE — 700111 HCHG RX REV CODE 636 W/ 250 OVERRIDE (IP)

## 2025-03-10 PROCEDURE — A9270 NON-COVERED ITEM OR SERVICE: HCPCS | Performed by: HOSPITALIST

## 2025-03-10 PROCEDURE — 80053 COMPREHEN METABOLIC PANEL: CPT

## 2025-03-10 PROCEDURE — 700102 HCHG RX REV CODE 250 W/ 637 OVERRIDE(OP)

## 2025-03-10 PROCEDURE — 85025 COMPLETE CBC W/AUTO DIFF WBC: CPT

## 2025-03-10 PROCEDURE — 92526 ORAL FUNCTION THERAPY: CPT

## 2025-03-10 PROCEDURE — 700105 HCHG RX REV CODE 258: Performed by: HOSPITALIST

## 2025-03-10 PROCEDURE — 700102 HCHG RX REV CODE 250 W/ 637 OVERRIDE(OP): Performed by: HOSPITALIST

## 2025-03-10 PROCEDURE — 700117 HCHG RX CONTRAST REV CODE 255

## 2025-03-10 RX ORDER — REGADENOSON 0.08 MG/ML
INJECTION, SOLUTION INTRAVENOUS
Status: COMPLETED
Start: 2025-03-10 | End: 2025-03-10

## 2025-03-10 RX ADMIN — SODIUM CHLORIDE, POTASSIUM CHLORIDE, SODIUM LACTATE AND CALCIUM CHLORIDE: 600; 310; 30; 20 INJECTION, SOLUTION INTRAVENOUS at 11:08

## 2025-03-10 RX ADMIN — FLECAINIDE ACETATE 50 MG: 100 TABLET ORAL at 05:15

## 2025-03-10 RX ADMIN — REGADENOSON 0.4 MG: 0.08 INJECTION, SOLUTION INTRAVENOUS at 08:50

## 2025-03-10 RX ADMIN — OXYCODONE HYDROCHLORIDE 5 MG: 5 TABLET ORAL at 16:34

## 2025-03-10 RX ADMIN — Medication: at 16:28

## 2025-03-10 RX ADMIN — DILTIAZEM HYDROCHLORIDE 240 MG: 240 CAPSULE, EXTENDED RELEASE ORAL at 16:28

## 2025-03-10 RX ADMIN — ASPIRIN 81 MG: 81 TABLET, COATED ORAL at 05:15

## 2025-03-10 RX ADMIN — FLECAINIDE ACETATE 50 MG: 100 TABLET ORAL at 16:28

## 2025-03-10 RX ADMIN — IOHEXOL 85 ML: 350 INJECTION, SOLUTION INTRAVENOUS at 17:45

## 2025-03-10 RX ADMIN — ACETAMINOPHEN 650 MG: 325 TABLET ORAL at 11:13

## 2025-03-10 RX ADMIN — DILTIAZEM HYDROCHLORIDE 240 MG: 240 CAPSULE, EXTENDED RELEASE ORAL at 05:15

## 2025-03-10 RX ADMIN — Medication: at 05:15

## 2025-03-10 ASSESSMENT — ENCOUNTER SYMPTOMS
HEARTBURN: 0
PALPITATIONS: 0
ABDOMINAL PAIN: 0
DIZZINESS: 0
VOMITING: 0
HEADACHES: 0
FEVER: 0
DIARRHEA: 0
NAUSEA: 0
SHORTNESS OF BREATH: 0
COUGH: 0
CHILLS: 0

## 2025-03-10 ASSESSMENT — PAIN DESCRIPTION - PAIN TYPE
TYPE: ACUTE PAIN
TYPE: ACUTE PAIN

## 2025-03-10 ASSESSMENT — FIBROSIS 4 INDEX: FIB4 SCORE: 1.43

## 2025-03-10 NOTE — PROGRESS NOTES
I will be off duty and signed out of voalte at 3pm.  If you have any needs for this patient after 3pm, please reach out to the on call Joyce POLK.  Thank you!

## 2025-03-10 NOTE — PROGRESS NOTES
[facility] Medicine Daily Progress Note    Date of Service  3/10/2025    Chief Complaint  Venkata David is a 69 y.o. male admitted 3/8/2025 with chest pain.    Hospital Course  Venkata David is a 69 y.o. male who presented 3/8/2025 with past medical history of atrial fibrillation, hypertension, history of systolic heart failure with improved EF who presents to the hospital for chest pain.  The chest pain is all around his chest and is nonradiating or positional.  He complains of exertional chest pain.the patient does complain of difficulty swallowing where solids will get stuck.  The patient was transferred from [facility].  He was noted to be bradycardic and hypotensive.  Patient was given IV calcium and IV fluids at the outlying facility.     The patient states that for the past few weeks he was double dosing and all of his medications.  This was not prescribed by his cardiologist.  Last year he was switched to long-acting Cardizem.  EKG found sinus bradycardia.  On the heart monitor patient was having episodes of junctional bradycardia with sinus bradycardia.    Interval Problem Update  3/9 afebrile, vitals somewhat stable.  Still having bradycardia high 40s to high 50s, slightly tachypneic 22-25 respirations. Patient tells me his heart rate has always been low.  He is on room air. His main concern is his swallowing.  He has lost weight due to difficulty swallowing.  He DOES have an appetite, but not able to get food down very well. Stress test pending. Echo pending.  Barium swallow pending.     3/10 Afebrile, vitals stable and on room air.  Stress test is negative for any ischemia.  Barium swallow shows hernia.  Discussed with gen surgery- Dr Swain, recommend CT abd/pelvis.  Also, gen surgery does not handle these type of issues would need to be a special surgeon (Sasse, Ganser, Gerber).  May be able to get done inpatient pending other work up. Consulted SLP for evaluation. Kidney function is  improved.  ECHO still pending.  Will keep one more night to further monitor kidneys, get echo results, obtain CT abd/pelvis- possible surgery? and SLP to evaluate.    I have discussed this patient's plan of care and discharge plan at IDT rounds today with Case Management, Nursing, Nursing leadership, and other members of the IDT team.    Consultants/Specialty  None    Code Status  Full Code    Disposition  The patient is not medically cleared for discharge to home or a post-acute facility.  Anticipate discharge to: home with close outpatient follow-up    I have placed the appropriate orders for post-discharge needs.    Review of Systems  Review of Systems   Constitutional:  Positive for malaise/fatigue. Negative for chills and fever.   HENT:          Difficulty swallowing   Respiratory:  Negative for cough and shortness of breath.    Cardiovascular:  Positive for chest pain. Negative for palpitations and leg swelling.   Gastrointestinal:  Negative for abdominal pain, diarrhea, heartburn, nausea and vomiting.   Genitourinary:  Negative for dysuria, frequency and urgency.   Neurological:  Negative for dizziness and headaches.   All other systems reviewed and are negative.       Physical Exam  Temp:  [36.3 °C (97.3 °F)-36.6 °C (97.9 °F)] 36.3 °C (97.3 °F)  Pulse:  [61-81] 68  Resp:  [17-19] 17  BP: (111-122)/(56-66) 116/66  SpO2:  [92 %-96 %] 96 %    Physical Exam  Vitals and nursing note reviewed.   Constitutional:       Appearance: Normal appearance. He is not ill-appearing.   HENT:      Head: Normocephalic and atraumatic.      Jaw: There is normal jaw occlusion.      Right Ear: Hearing normal.      Left Ear: Hearing normal.      Nose: Nose normal.      Mouth/Throat:      Lips: Pink.      Mouth: Mucous membranes are moist.   Eyes:      Extraocular Movements: Extraocular movements intact.      Conjunctiva/sclera: Conjunctivae normal.      Pupils: Pupils are equal, round, and reactive to light.   Neck:      Vascular:  No carotid bruit.   Cardiovascular:      Rate and Rhythm: Normal rate and regular rhythm.      Pulses: Normal pulses.      Heart sounds: Normal heart sounds, S1 normal and S2 normal.   Pulmonary:      Effort: Pulmonary effort is normal.      Breath sounds: Normal breath sounds and air entry. No stridor.   Musculoskeletal:      Cervical back: Normal range of motion and neck supple.      Right lower leg: Edema present.      Left lower leg: Edema present.   Feet:      Right foot:      Skin integrity: Erythema, callus and dry skin present.      Left foot:      Skin integrity: Erythema, callus and dry skin present.   Skin:     General: Skin is warm and dry.      Capillary Refill: Capillary refill takes less than 2 seconds.   Neurological:      General: No focal deficit present.      Mental Status: He is alert and oriented to person, place, and time. Mental status is at baseline.      Sensory: Sensation is intact.      Motor: Motor function is intact.   Psychiatric:         Attention and Perception: Attention and perception normal.         Mood and Affect: Mood and affect normal.         Speech: Speech normal.         Behavior: Behavior normal. Behavior is cooperative.         Fluids    Intake/Output Summary (Last 24 hours) at 3/10/2025 1246  Last data filed at 3/10/2025 0219  Gross per 24 hour   Intake --   Output 550 ml   Net -550 ml        Laboratory  Recent Labs     03/09/25  0004 03/10/25  0156   WBC 7.2 6.0   RBC 4.89 4.75   HEMOGLOBIN 13.5* 12.9*   HEMATOCRIT 41.6* 40.5*   MCV 85.1 85.3   MCH 27.6 27.2   MCHC 32.5 31.9*   RDW 47.8 47.9   PLATELETCT 440 357   MPV 9.1 9.0     Recent Labs     03/09/25  0004 03/10/25  0156   SODIUM 138 136   POTASSIUM 5.1 5.1   CHLORIDE 107 110   CO2 19* 17*   GLUCOSE 119* 109*   BUN 54* 32*   CREATININE 1.56* 0.99   CALCIUM 9.0 9.0             Recent Labs     03/10/25  0156   TRIGLYCERIDE 90   HDL 25*   LDL 42       Imaging  NM-CARDIAC STRESS TEST   Final Result      DX-PAOLA ROBB  (BARIUM SWALLOW)   Final Result      1. Dilated esophagus at the thoracic inlet, filled with contrast and retained oral secretions, with antegrade passage into the stomach. The stomach is located in the mediastinum, to the right of midline, likely secondary to either a congenital right    diaphragmatic hernia or acquired right diaphragmatic hernia.   2. Slow but progressive antegrade passage of the contrast into normal caliber small bowel loops in the abdomen.      DX-CHEST-PORTABLE (1 VIEW)   Final Result         1.  Hazy right infrahilar density suggesting subtle infiltrate   2.  Prominence of the right mediastinal contour, visible on multiple prior studies dating to August 27, 2024. Correlate with history. Follow-up evaluation with CT chest for further characterization as clinically appropriate   3.  Cardiomegaly      EC-ECHOCARDIOGRAM COMPLETE W/O CONT    (Results Pending)        Assessment/Plan  * Acute renal failure (HCC)- (present on admission)  Assessment & Plan  Hold torsemide and Aldactone  Low-dose IV fluids  Blood pressure has improved  Monitor BMP and assess response  Avoid IV contrast/nephrotoxins/NSAIDs  Dose adjust meds for decreased GFR      Chest pain  Assessment & Plan  Rule out ACS  Initial EKG and troponin negative for ischemia  Continuous cardiac monitoring with serial EKG and troponin  Nuclear medicine cardiac stress test   aspirin  Check lipid panel, TSH and hemoglobin A1c  Nitro when necessary for chest pain        Junctional bradycardia  Assessment & Plan  Monitor blood pressure  Atropine if heart rate below 30  Cardiac echo has been ordered    Paroxysmal atrial fibrillation (HCC)- (present on admission)  Assessment & Plan  Patient was double dosing on his medications  I will hold his medications for tonight since he is having episodes of junctional bradycardia    Dysphagia  Assessment & Plan  Esophogram reveals Dilated esophagus at the thoracic inlet, filled with contrast and retained oral  secretions, with antegrade passage into the stomach. The stomach is located in the mediastinum, to the right of midline, likely secondary to either a congenital right   diaphragmatic hernia or acquired right diaphragmatic hernia.  3/10 Discussed with gen surgery- Dr Swain, recommend CT abd/pelvis.  Also, gen surgery does not handle these type of issues would need to be a special surgeon (Sasse, Ganser, Chon).  May be able to get done inpatient pending other work up.         VTE prophylaxis:   SCDs/TEDs      I have performed a physical exam and reviewed and updated ROS and Plan today (3/10/2025). In review of yesterday's note (3/9/2025), there are no changes except as documented above.

## 2025-03-10 NOTE — THERAPY
"Speech Language Pathology   Daily Treatment     Patient Name: Venkata David  AGE:  69 y.o., SEX:  male  Medical Record #: [id number]  Date of Service: 3/10/2025      Precautions:  Precautions: Swallow Precautions         Subjective  Pt awake and agreeable to therapy.       Assessment  No overt s/sx of aspiration noted with trials of soft and bite size texture, however, pt c/o globus sensation in mid-esophageal region. He endorsed difficulty chewing minced and moist meats 2/2 edentulism but when asked if he was having trouble chewing soft and bite size texture he said \"no\". He endorsed difficulty swallowing hard foods since he was 24. APRN present at bedside for the session who will contact general surgery to determine if pt will have OP vs inpatient hernia surgery.      Clinical Impressions  Pt continues to present with s/sx concerning for a chronic esophageal dysphagia. Recommend continuation of a minced and moist/thin liquid diet given globus sensation, hx of edentulism, and findings of a hernia per APRN. Please hold PO with difficulty.       Recommendations  Treatment Completed: Dysphagia Treatment       Dysphagia Treatment  Diet Consistency: Minced moist/Thin liquids  Instrumentation: None indicated at this time  Medication: As tolerated  Supervision: Independent  Positioning: Fully upright and midline during oral intake  Risk Management : Small bites/sips, Alternate bites and sips, Slow rate of intake, Physical mobility, as tolerated  Oral Care: BID                     SLP Treatment Plan  Treatment Plan: Dysphagia Treatment  SLP Frequency: 3x Per Week  Estimated Duration: Until Therapy Goals Met      Anticipated Discharge Needs  Discharge Recommendations: Anticipate that the patient will have no further speech therapy needs after discharge from the hospital  Therapy Recommendations Upon DC: Not Indicated      Patient / Family Goals  Patient / Family Goal #1: \"I want to be able to eat normal foods again\"  Goal " #1 Outcome: Progressing slower than expected  Short Term Goals  Short Term Goal # 1: Patient will consume minced moist with thin liquids without overt s/s of aspiraiton or globus sensation.  Goal Outcome # 1: Progressing as expected      Devika Allen, SLP

## 2025-03-10 NOTE — CARE PLAN
The patient is Stable - Low risk of patient condition declining or worsening    Shift Goals  Clinical Goals: NPO for stress test AM, VSS  Patient Goals: eat, diagnostic testing  Family Goals: not present    Progress made toward(s) clinical / shift goals:    Problem: Knowledge Deficit - Standard  Goal: Patient and family/care givers will demonstrate understanding of plan of care, disease process/condition, diagnostic tests and medications  Outcome: Progressing     Problem: Communication  Goal: The ability to communicate needs accurately and effectively will improve  Outcome: Progressing     Problem: Hemodynamics  Goal: Patient's hemodynamics, fluid balance and neurologic status will be stable or improve  Outcome: Progressing     Problem: Mobility  Goal: Patient's capacity to carry out activities will improve  Outcome: Progressing     Problem: Self Care  Goal: Patient will have the ability to perform ADLs independently or with assistance (bathe, groom, dress, toilet and feed)  Outcome: Progressing     Problem: Pain - Standard  Goal: Alleviation of pain or a reduction in pain to the patient’s comfort goal  Outcome: Progressing

## 2025-03-10 NOTE — PROGRESS NOTES
Received bedside report from RN Corinne, pt care assumed. VS stable, Pt A&Ox4, c/o 0/10 pain at this time. Bed locked and in lowest position, bed alarm off, pt is up-self.

## 2025-03-10 NOTE — DIETARY
"Nutrition Services: Initial Assessment     Day 1 of admit. Venkata David is 69 y.o., male with admitting DX of Acute renal failure (HCC) [N17.9].    Consult Received for: MST - Malnutrition Screening Tool    Current Hospital Problems List: PB, Junctional bradycardia, Dysphagia, Chest pain. Paroxysmal Afib.    A    Nutrition Assessment:      Height: 172.7 cm (5' 8\")  Weight: 89.2 kg (196 lb 10.4 oz)  Weight taken via: Stand Up Scale  BMI Calculated: 29.87  BMI Classification: Overweight       Weight Readings from Last 5 encounters:   Wt Readings from Last 5 Encounters:   03/10/25 89.2 kg (196 lb 10.4 oz)   02/20/25 87.5 kg (193 lb)   11/07/24 96.6 kg (213 lb)   05/02/24 101 kg (222 lb 9.6 oz)   10/26/23 105 kg (231 lb)     Weight loss 26 lbs (11.7%) x4 months is consider significant.     Objective:   Pertinent Medical Hx: Dysphagia, systolic HF   Pertinent Labs: Hemoglobin 12.9, Hematocrit 40.5, Glucose 109, Bun 32, Cholesterol 85. HDL 25   Pertinent Meds: n/a   Skin/Wounds:  n/a   Food Allergies: NKFA  Last BM:  Not observed  03/09/25       Current Diet Order/Intake:   IDDSI Level 5 - Minced and moist    Subjective:     saw the patient at the bedside for MST. Dysphagia was noted, and the patient was seen by SLP today.  Per SLP note, concerned about chronic esophageal dysphagia. Recommended moist and minced foods. The patient reported better tolerance with soft foods and denied a loss of appetite prior to admission. He mentioned, \"I’m still eating\" and \"I have a good appetite.\" He reported eating 3 meals a day prior to admission and report improvement in chest pain. Mild fat and muscle loss were observed during  NFPE. Agreed to add Ensure HP BID .    Patient reported UBW: 251 lbs x 3 months ago   Dietary Recall/Energy Intake: Patient endorsed difficulty swallowing regular-textured food; however, this has not affected his PO. He denied a loss of appetite and reported eating three meals a day prior to " admission.    This indicates Sufficient energy intake.       Nutrition Focused Physical Exam (NFPE)  Weight Loss: Sever weight loss of 11.7% x 4 month. RD Suspects this change related to SOB secondary to Afib  .  Muscle Mass: Mild Wasting at temple   Subcutaneous Fat: Mild Loss at buccal   Fluid Accumulation: n/a   Reduced  Strength: N/A in acute care setting.    Nutrition Diagnosis:      Moderate Malnutrition in context of Chronic Illness / related to Afib as evidenced by severe weight loss 11.7% x 4 months. Mild muscle loss at (temple) Severe fat loss at ( buccal)     RD notified provider  Reva Naranjo  .     Nutrition Interventions:      Recommend Ensure Plus High Protein (provides 350 calories) 20 g protein per 8 fl oz) BID.  Patient aware of active plan of care as appropriate.       Nutrition Monitoring and Evaluation:      Monitor nutrition POC, goal for > 50 % intake from meals and supplements.  Additional fluids per MD/DO  Monitor vital signs pertinent to nutrition.    RD following and will provide updated recommendations as indicated.      Rosamaria EDMONDSON/AND CRITERIA FOR MALNUTRITION

## 2025-03-11 ENCOUNTER — APPOINTMENT (OUTPATIENT)
Dept: CARDIOLOGY | Facility: MEDICAL CENTER | Age: 70
DRG: 683 | End: 2025-03-11
Attending: HOSPITALIST
Payer: MEDICARE

## 2025-03-11 VITALS
BODY MASS INDEX: 29.17 KG/M2 | SYSTOLIC BLOOD PRESSURE: 112 MMHG | DIASTOLIC BLOOD PRESSURE: 58 MMHG | HEIGHT: 68 IN | HEART RATE: 64 BPM | TEMPERATURE: 97.5 F | RESPIRATION RATE: 17 BRPM | WEIGHT: 192.46 LBS | OXYGEN SATURATION: 93 %

## 2025-03-11 LAB
LV EJECT FRACT  99904: 67
LV EJECT FRACT MOD 2C 99903: 71.54
LV EJECT FRACT MOD 4C 99902: 58.3
LV EJECT FRACT MOD BP 99901: 67.04

## 2025-03-11 PROCEDURE — 93306 TTE W/DOPPLER COMPLETE: CPT

## 2025-03-11 PROCEDURE — 700102 HCHG RX REV CODE 250 W/ 637 OVERRIDE(OP): Performed by: HOSPITALIST

## 2025-03-11 PROCEDURE — 700102 HCHG RX REV CODE 250 W/ 637 OVERRIDE(OP)

## 2025-03-11 PROCEDURE — 99239 HOSP IP/OBS DSCHRG MGMT >30: CPT | Performed by: STUDENT IN AN ORGANIZED HEALTH CARE EDUCATION/TRAINING PROGRAM

## 2025-03-11 PROCEDURE — A9270 NON-COVERED ITEM OR SERVICE: HCPCS | Performed by: HOSPITALIST

## 2025-03-11 PROCEDURE — A9270 NON-COVERED ITEM OR SERVICE: HCPCS

## 2025-03-11 PROCEDURE — 93306 TTE W/DOPPLER COMPLETE: CPT | Mod: 26 | Performed by: INTERNAL MEDICINE

## 2025-03-11 PROCEDURE — 700105 HCHG RX REV CODE 258: Performed by: HOSPITALIST

## 2025-03-11 RX ORDER — DILTIAZEM HYDROCHLORIDE 240 MG/1
240 CAPSULE, COATED, EXTENDED RELEASE ORAL DAILY
Qty: 100 CAPSULE | Refills: 3 | Status: SHIPPED | OUTPATIENT
Start: 2025-03-11

## 2025-03-11 RX ORDER — ATORVASTATIN CALCIUM 20 MG/1
40 TABLET, FILM COATED ORAL DAILY
Qty: 100 TABLET | Refills: 3 | Status: SHIPPED | OUTPATIENT
Start: 2025-03-11 | End: 2026-04-15

## 2025-03-11 RX ORDER — ASPIRIN 81 MG/1
81 TABLET, CHEWABLE ORAL DAILY
Qty: 100 TABLET | Refills: 3 | Status: SHIPPED | OUTPATIENT
Start: 2025-03-11

## 2025-03-11 RX ADMIN — Medication: at 05:32

## 2025-03-11 RX ADMIN — DILTIAZEM HYDROCHLORIDE 240 MG: 240 CAPSULE, EXTENDED RELEASE ORAL at 05:31

## 2025-03-11 RX ADMIN — ASPIRIN 81 MG: 81 TABLET, COATED ORAL at 05:31

## 2025-03-11 RX ADMIN — SODIUM CHLORIDE, POTASSIUM CHLORIDE, SODIUM LACTATE AND CALCIUM CHLORIDE: 600; 310; 30; 20 INJECTION, SOLUTION INTRAVENOUS at 05:39

## 2025-03-11 RX ADMIN — FLECAINIDE ACETATE 50 MG: 100 TABLET ORAL at 05:31

## 2025-03-11 RX ADMIN — OXYCODONE HYDROCHLORIDE 5 MG: 5 TABLET ORAL at 00:02

## 2025-03-11 ASSESSMENT — FIBROSIS 4 INDEX: FIB4 SCORE: 1.43

## 2025-03-11 ASSESSMENT — PAIN DESCRIPTION - PAIN TYPE
TYPE: ACUTE PAIN

## 2025-03-11 ASSESSMENT — PATIENT HEALTH QUESTIONNAIRE - PHQ9
1. LITTLE INTEREST OR PLEASURE IN DOING THINGS: NOT AT ALL
2. FEELING DOWN, DEPRESSED, IRRITABLE, OR HOPELESS: NOT AT ALL
SUM OF ALL RESPONSES TO PHQ9 QUESTIONS 1 AND 2: 0

## 2025-03-11 NOTE — DISCHARGE SUMMARY
Discharge Summary    CHIEF COMPLAINT ON ADMISSION  Chief Complaint   Patient presents with    Chest Pain     Transfer from [place] for CP and + trops.  Per diallosa pt was hypotensive prior to transfer, received calcium gluc and some IVF, pressures have stabilized at this time. Pt ambulatory with steady gait, GCS 15, VSS       Reason for Admission  Cardiology (chest pain, SOB, incre*     Admission Date  3/8/2025    CODE STATUS  Full Code    HPI & HOSPITAL COURSE    Venkata David is a 69 y.o. male who presented 3/8/2025 with past medical history of atrial fibrillation, hypertension, history of systolic heart failure with improved EF who presents to the hospital for chest pain.  The chest pain is all around his chest and is nonradiating or positional.  He complains of exertional chest pain.the patient does complain of difficulty swallowing where solids will get stuck.  The patient was transferred from [facility].  He was noted to be bradycardic and hypotensive.  Patient was given IV calcium and IV fluids at the outlying facility.  The patient states that for the past few weeks he was double dosing and all of his medications.  This was not prescribed by his cardiologist.  Last year he was switched to long-acting Cardizem.  EKG found sinus bradycardia.  On the heart monitor patient was having episodes of junctional bradycardia with sinus bradycardia. His heart rate and blood pressure did improve. He was resumed on his cardiac medications and was instructed to not change dosing without speaking with a physician first. His EKG showed no acute ischemia, troponin was minimally elevated and flat 30's. Echo showed normal EF 67% and no wall motion abnormalities. Imaging did show a dilated esophagus with narrowing at GE junction, he notes a 25-30 lb unintentional weight loss and difficulty swallowing certain foods with increased indigestion symptoms, I did consult with GI however pt stated that his symptoms have  been chronic and urgent evaluation was not felt to be warranted and patient wanted outpatient follow up. I highly encouraged him to seek follow up with GI given his symptoms and weight loss. I suspect that some of his chest pain symptoms too may be in fact due to his upper digestive issues.     At the time of discharge he is hemodynamically stable and at his baseline and tolerated diet.     Therefore, he is discharged in fair and stable condition to home with close outpatient follow-up.    The patient met 2-midnight criteria for an inpatient stay at the time of discharge.    Discharge Date  3/11/2025    FOLLOW UP ITEMS POST DISCHARGE  Cardiology follow up,  med reconciliation  GI referral placed, will need follow up, Recommend EGD     DISCHARGE DIAGNOSES  Principal Problem:    Acute renal failure (HCC) (POA: Yes)  Active Problems:    Paroxysmal atrial fibrillation (HCC) (POA: Yes)      Overview: January 2025: Echocardiogram with normal LV size, LVEF 60-65%. Normal RA       and RV, enlarged LA. Moderate AR, mild MR, mild TR. RVSP 40mmHg.      April 2023: Echocardiogram with normal LV size, LVEF 60-65%. Normal RA and       RV, moderately enlarged LA. Mild AR, mild MR, mild TR. RVSP 45mmHg.    Junctional bradycardia (POA: Unknown)    Dysphagia (POA: Unknown)    Chest pain (POA: Unknown)  Resolved Problems:    * No resolved hospital problems. *      FOLLOW UP  Future Appointments   Date Time Provider Department Center   8/21/2025  1:00 PM YAN [facility] None     No follow-up provider specified.    MEDICATIONS ON DISCHARGE     Medication List        CHANGE how you take these medications        Instructions   aspirin 81 MG Chew chewable tablet  What changed: when to take this  Commonly known as: Asa   Chew 1 Tablet every day.  Dose: 81 mg     atorvastatin 20 MG Tabs  What changed: how much to take  Commonly known as: Lipitor   Take 2 Tablets by mouth every day.  Dose: 40 mg     dilTIAZem  MG Cp24  What  changed: when to take this  Commonly known as: Cardizem CD   Take 1 Capsule by mouth every day.  Dose: 240 mg            CONTINUE taking these medications        Instructions   CENTRUM ADULTS PO   Take  by mouth every day.     flecainide 50 MG tablet  Commonly known as: Tambocor   Take 1 Tablet by mouth 2 times a day.  Dose: 50 mg     GLUCOSAMINE CHONDR COMPLEX PO   Take  by mouth every day.     spironolactone 50 MG Tabs  Commonly known as: Aldactone   Take 1 Tablet by mouth every day.  Dose: 50 mg     torsemide 20 MG Tabs  Commonly known as: Demadex   Take 1 Tablet by mouth every day.  Dose: 20 mg              Allergies  No Known Allergies    DIET  Orders Placed This Encounter   Procedures    Diet Order Diet: Level 5 - Minced and Moist; Liquid level: Level 0 - Thin     Standing Status:   Standing     Number of Occurrences:   1     Diet::   Level 5 - Minced and Moist [24]     Liquid level:   Level 0 - Thin       ACTIVITY  As tolerated.      CONSULTATIONS  GI    PROCEDURES  NA    LABORATORY  Lab Results   Component Value Date    SODIUM 136 03/10/2025    POTASSIUM 5.1 03/10/2025    CHLORIDE 110 03/10/2025    CO2 17 (L) 03/10/2025    GLUCOSE 109 (H) 03/10/2025    BUN 32 (H) 03/10/2025    CREATININE 0.99 03/10/2025        Lab Results   Component Value Date    WBC 6.0 03/10/2025    HEMOGLOBIN 12.9 (L) 03/10/2025    HEMATOCRIT 40.5 (L) 03/10/2025    PLATELETCT 357 03/10/2025        Total time of the discharge process exceeds 36 minutes.

## 2025-03-11 NOTE — PROGRESS NOTES
Bedside shift report received from day shift RN. Pt care assumed. A&O x 4. Calm and cooperative. No complaints of pain. Telemetry in place. Bed in low and locked position. Call light and belongings within reach. All pt concerns addressed. No further assistance needed at this time.

## 2025-03-11 NOTE — CARE PLAN
The patient is Stable - Low risk of patient condition declining or worsening    Shift Goals  Clinical Goals: stress test, ECHO  Patient Goals: eat  Family Goals: not present    Progress made toward(s) clinical / shift goals:    Problem: Knowledge Deficit - Standard  Goal: Patient and family/care givers will demonstrate understanding of plan of care, disease process/condition, diagnostic tests and medications  Outcome: Progressing     Problem: Communication  Goal: The ability to communicate needs accurately and effectively will improve  Outcome: Progressing     Problem: Hemodynamics  Goal: Patient's hemodynamics, fluid balance and neurologic status will be stable or improve  Outcome: Progressing     Problem: Mobility  Goal: Patient's capacity to carry out activities will improve  Outcome: Progressing       Patient is not progressing towards the following goals:

## 2025-03-11 NOTE — CONSULTS
Date of Consultation:  3/11/2025    Patient: : Venkata David  MRN: [id number]    Referring Physician:   Vibha Nation M.D.Attending     GI:Keshawn Garces M.D.     Reason for Consultation: CT scan found dilated esophagus    History of Present Illness:   The patient is a 69-year-old gentleman with a medical history of congestive heart failure and atrial fibrillation, admitted for chest pain and arrhythmia.    Regarding his gastrointestinal history, the patient has GERD and underwent an upper GI endoscopy a year ago for GERD and mild dysphagia. The diagnosis at that time, made at an outside hospital, was GERD-related scar formation at the gastroesophageal (GE) junction. The outside GI provider recommended acid suppression therapy, but the patient's compliance is uncertain.    The GI team evaluated the patient at his bedside while he was finishing breakfast. He successfully consumed cereal, juice, and coffee and was working on his egg. The patient denies any changes in his upper GI symptoms, stating that they have remained stable for years. He reports no recent worsening of dysphagia, nausea, vomiting, or regurgitation.    He has no significant lower GI symptoms. Although he reports some weight loss, he has not noticed any changes in his oral intake or bowel movements.      Past Medical History:   Diagnosis Date    Paroxysmal atrial fibrillation ([facility]) 01/2025    Echocardiogram with normal LV size, LVEF 60-65%. Normal RA and RV, enlarged LA. Moderate AR, mild MR, mild TR. RVSP 40mmHg.    CHF (congestive heart failure) ([facility]) 12/2017    Echocardiogram with rEF of 45%. April 2023: Echocardiogram with LVEF 60-65%.    Hepatitis C 1980    Bowel habit changes     Constipation    Chronic anticoagulation     Disorder of thyroid     As a child only    Hyperglycemia     Hyperlipidemia     Hypertension     Indigestion     Obesity     Psychiatric problem     Renal disorder     KIDNEY FAILURE EARLY PT.    Urinary bladder  disorder     ENLARGED PROSTATE         Past Surgical History:   Procedure Laterality Date    TRANS URETHRAL RESECTION PROSTATE  2017    Procedure: TRANS URETHRAL RESECTION PROSTATE;  Surgeon: Jonny Chávez M.D.;  Location: SURGERY [facility] [place];  Service: Urology    INGUINAL HERNIA REPAIR CHILD  as child       No family history on file.    Social History     Socioeconomic History    Marital status:    Tobacco Use    Smoking status: Former     Current packs/day: 0.00     Types: Cigarettes     Quit date: 2018     Years since quittin.7    Smokeless tobacco: Never   Vaping Use    Vaping status: Never Used   Substance and Sexual Activity    Alcohol use: No    Drug use: No     Social Drivers of Health     Food Insecurity: No Food Insecurity (3/9/2025)    Hunger Vital Sign     Worried About Running Out of Food in the Last Year: Never true     Ran Out of Food in the Last Year: Never true   Transportation Needs: No Transportation Needs (3/9/2025)    PRAPARE - Transportation     Lack of Transportation (Medical): No     Lack of Transportation (Non-Medical): No   Intimate Partner Violence: Not At Risk (3/9/2025)    Humiliation, Afraid, Rape, and Kick questionnaire     Fear of Current or Ex-Partner: No     Emotionally Abused: No     Physically Abused: No     Sexually Abused: No   Housing Stability: Low Risk  (3/9/2025)    Housing Stability Vital Sign     Unable to Pay for Housing in the Last Year: No     Number of Times Moved in the Last Year: 1     Homeless in the Last Year: No           Physical Exam:  Vitals:    03/10/25 1911 03/10/25 2347 25 0409 25 0718   BP: 103/61 118/59 134/82 104/61   Pulse: 73 62 78 74   Resp: 16 16 18 17   Temp: 36.4 °C (97.5 °F) 36.4 °C (97.5 °F) 36.5 °C (97.7 °F) 36.1 °C (97 °F)   TempSrc: Temporal Temporal Temporal Temporal   SpO2: 96% 94% 94% 95%   Weight:   87.3 kg (192 lb 7.4 oz)    Height:         Constitutional: No fevers.  Eyes: No symptoms reported.    Ears/Nose/Throat/Mouth: No choking reported.  Cardiovascular: No chest pain reported.   Respiratory: Denies shortness of breath.  Gastrointestinal/Hepatic: Per H/P.   Skin/Breast: No new rash reported.   Psychiatric: No complaints    HEENT: grossly normal.  Cardiovascular: Please refer to primary team's daily assessment.   Lungs: Please refer to primary team's daily assessment.   Abdomen: No abd pain.   Skin: No erythema, No rash.  Neurologic: Alert & oriented x 3, Normal motor function, No focal deficits noted.  PSY: stable mood.           Labs:  Recent Labs     03/09/25  0004 03/10/25  0156   WBC 7.2 6.0   RBC 4.89 4.75   HEMOGLOBIN 13.5* 12.9*   HEMATOCRIT 41.6* 40.5*   MCV 85.1 85.3   MCH 27.6 27.2   MCHC 32.5 31.9*   RDW 47.8 47.9   PLATELETCT 440 357   MPV 9.1 9.0     Recent Labs     03/09/25  0004 03/10/25  0156   SODIUM 138 136   POTASSIUM 5.1 5.1   CHLORIDE 107 110   CO2 19* 17*   GLUCOSE 119* 109*   BUN 54* 32*           Recent Labs     03/09/25  0004 03/10/25  0156   ASTSGOT 13 19   ALTSGPT 9 7   TBILIRUBIN <0.2 0.2   ALKPHOSPHAT 137* 133*   GLOBULIN 3.1 3.2         Imaging:  CT-ABDOMEN-PELVIS WITH  Narrative: 3/10/2025 5:10 PM    HISTORY/REASON FOR EXAM:  Esophageal hernia..    TECHNIQUE/EXAM DESCRIPTION:   CT scan of the abdomen and pelvis with contrast.    Contrast-enhanced helical scanning was obtained from the diaphragmatic domes through the pubic symphysis following the bolus administration of nonionic contrast without complication.    85 mL of Omnipaque 350 nonionic contrast was administered without complication.    Low dose optimization technique was utilized for this CT exam including automated exposure control and adjustment of the mA and/or kV according to patient size.    COMPARISON: No prior studies available.    FINDINGS:  Lower Chest: There is a calcified granuloma within the right lung base. There is elevation of the left hemidiaphragm. The esophagus is markedly dilated and filled with  fluid and debris with dense contrast related to prior esophagram. There is likely   marked narrowing of the esophagus at the gastroesophageal junction..    Liver: Fatty change.    Spleen: Unremarkable.    Pancreas: Unremarkable.    Gallbladder: There are possible gallstones within the gallbladder.    Biliary: Nondilated.    Adrenal glands: Normal.    Kidneys: There is mild bilateral renal atrophy..    Bowel: There is dense material within bowel related to prior contrast administration. No obstruction or focal inflammatory change..    Lymph nodes: No adenopathy.    Vasculature: Unremarkable.    Musculoskeletal: There is multilevel degenerative change of the thoracolumbar spine..    Pelvis: There is a fat-containing left inguinal hernia. There is a umbilical hernia which contains fat..  Impression: 1.  No evidence of bowel obstruction or focal inflammatory change.    2.  Markedly dilated esophagus with likely high-grade narrowing at the gastroesophageal junction.    3.  Possible gallstones.    4.  Fatty liver.    5.  Mild bilateral renal atrophy.    6.  Fat-containing umbilical hernia.    7.  Small fat-containing left inguinal hernia.  NM-CARDIAC STRESS TEST   Myocardial Perfusion   Report   NUCLEAR IMAGING INTERPRETATION   There is diaphragmatic attenuation.   No infarct or ischemia.   Normal left ventricular size, ejection fraction, and wall motion.   ECG INTERPRETATION   Negative stress ECG for ischemia.     CESILIA LANGSTON     MRN:    [id number]         Gender:    M     Exam Date: 03/10/2025 08:00     Exam Location:      Inpatient     Ordering Phys:     SANTY LLANOS     NucMed Tech:       Robyn Wright RT                       (N)     Age:    69    :    1955        Ht (in):     68     Wt (lb):     197    BMI:    29.80       Radiologist     Risk Factors:             Smoking, Hypercholesterolemia, Hypertension,     Indications:              Other chest pain     ICD Codes:                R07.89     Cardiac  History:          Positive risk factors, Palpitations, Syncope,   Atrial                              fibrillation, Chest Pain, Dyspnea     Cardiac Meds:     Meds Past 24 hrs:     Pretest Chest Pain:       No symptoms     STRESS TEST      Pharmacologi                    kenia Merrilliscan       Dose: 0.4 mg   ol:     Post-Injection Exercise:        An additional 1 minutes of exercise followed   the                                    intravenous injection     Resting HR (bpm):      71     Peak HR (bpm):         91     Resting BP (mmHg):       119    /   56     Peak BP (mmHg):       128   /   52     MaxPHR:     149     Target HR (bpm):       127     % MaxPHR:     61     Double Product:       [id number]     BP Response:     Stress Termination:     Stress Symptoms:   short of breath,abdomen cramp,right sided chest  pain     ECG     Resting ECG:     Sinus rhythm.     Stress ECG:      No ischemic changes with Regadenoson.     IMAGE PROTOCOL      Rest/Stress 1                        Day             RadiopharmaceuticalDose (mCi)   Imaging  Date      Imaging  Time           Inj to Img Time (min)   Rest:   Tc-99m             8.0          10-Mar-2025        08:41                   15   Stress: Tc-99m             30.0         10-Mar-2025        09:56                   30     Rest:   Administration Site:       Left antecubital                               fossa   Administered by:      Robyn Wright RT (N)     Stress:   Administration Site:       Left antecubital                               fossa   Administered by:      Mika Montalvo RT (R,N)     % Percent HR Achieved:   SPECT RESULTS     Technical Quality:       Good     Raw Data Analysis:   Summed Stress Score:    1   Summed Rest Score:    10   Summed Difference Score:        0   PERFUSION:   There is diaphragmatic attenuation.   No infarct or ischemia.     FUNCTIONAL RESULTS (calculated via Gated SPECT)     Stress Image LV EF:        66     %     Upper Normal Limit     Stress  EDV:      103    ml   EDVI:    51      ml/m2     Stress ESV:      35     ml   ESVI:    17      ml/m2     TID:    1.14   TID - 1.19      TID (ed) - 1.23   LV Function:   Normal left ventricular wall motion.  LV ejection fraction = 66%.     Mika Cook   Edited by: Garrick Dmuont M.D.   (Electronically Signed)   Final Date:      10 March 2025                     11:46   Amended:         10 March 2025 13:34      Impression and Recs:  The patient is a 69-year-old gentleman with a medical history of congestive heart failure and atrial fibrillation, admitted for chest pain and arrhythmia.    Regarding his gastrointestinal history, the patient has GERD and underwent an upper GI endoscopy years ago for GERD and mild dysphagia. The diagnosis at that time, made at an outside hospital, was GERD-related scar formation at the gastroesophageal (GE) junction. The outside GI provider recommended acid suppression therapy, but the patient's compliance is uncertain.    At this time, the patient would like to complete his heart study first.     For the GI, it seems he does not need any acute intervention now. He should have outpatient Gi follow up, repeated EGD and Colonoscopy with one sedation.     Please call us back if any acute GI intervention is needed.                 This note was generated using voice recognition software which has a small chance of producing errors of grammar and possibly content. I have made every reasonable attempt to find and correct any obvious errors, but expect that some may not be found prior to finalization of this note.

## 2025-03-11 NOTE — DISCHARGE INSTRUCTIONS
Your heart workup did not show evidence of heart attack or reduced heart function. Continue to take  your heart meds as prescribed, do not double your medications or change dosing of your medications without direction from a doctor   I highly recommend you follow up with a stomach doctor (gastroenterologist) to follow up on your esophagus issues. You should get an EGD to rule out malignancy given your complaints and weight loss.   Follow up with your primary care doctor and your cardiologist in the next 2-4 weeks.

## 2025-03-11 NOTE — PROGRESS NOTES
Discharge instructions reviewed and signed, all questions answered, PIV removed on unit, medications sent to outside pharmacy.

## 2025-03-11 NOTE — CARE PLAN
The patient is Stable - Low risk of patient condition declining or worsening    Shift Goals  Clinical Goals: echo  Patient Goals: rest  Family Goals: not present    Progress made toward(s) clinical / shift goals:    Problem: Hemodynamics  Goal: Patient's hemodynamics, fluid balance and neurologic status will be stable or improve  Outcome: Progressing     Problem: Self Care  Goal: Patient will have the ability to perform ADLs independently or with assistance (bathe, groom, dress, toilet and feed)  Outcome: Progressing     Problem: Pain - Standard  Goal: Alleviation of pain or a reduction in pain to the patient’s comfort goal  Outcome: Progressing       Patient is not progressing towards the following goals:

## 2025-03-17 NOTE — Clinical Note
REFERRAL APPROVAL NOTICE         Sent on March 17, 2025                   Venkata David  [address]  [place] [address]                   Dear Mr. David,    After a careful review of the medical information and benefit coverage, [facility] has processed your referral. See below for additional details.    If applicable, you must be actively enrolled with your insurance for coverage of the authorized service. If you have any questions regarding your coverage, please contact your insurance directly.    REFERRAL INFORMATION   Referral #:  [id number]  Referred-To Provider    Referred-By Provider:  Gastroenterology    Vibha Nation M.D.   [facility]      [address] [phone] [address] [address]  [phone]    Referral Start Date:  03/11/2025  Referral End Date:   03/11/2026             SCHEDULING  If you do not already have an appointment, please call [phone] to make an appointment.     MORE INFORMATION  If you do not already have a [employer] account, sign up at: [email or web address]  You can access your medical information, make appointments, see lab results, billing information, and more.  If you have questions regarding this referral, please contact  the [facility] Referrals department at:             [phone]. Monday - Friday 8:00AM - 5:00PM.     Sincerely,    [facility]

## 2025-08-21 ENCOUNTER — OFFICE VISIT (OUTPATIENT)
Dept: CARDIOLOGY | Facility: PHYSICIAN GROUP | Age: 70
End: 2025-08-21
Payer: MEDICARE

## 2025-08-21 VITALS
HEART RATE: 87 BPM | OXYGEN SATURATION: 99 % | BODY MASS INDEX: 27.37 KG/M2 | HEIGHT: 68 IN | SYSTOLIC BLOOD PRESSURE: 110 MMHG | RESPIRATION RATE: 14 BRPM | WEIGHT: 180.6 LBS | DIASTOLIC BLOOD PRESSURE: 56 MMHG

## 2025-08-21 DIAGNOSIS — I10 PRIMARY HYPERTENSION: ICD-10-CM

## 2025-08-21 DIAGNOSIS — I25.9 CHEST PAIN DUE TO MYOCARDIAL ISCHEMIA, UNSPECIFIED ISCHEMIC CHEST PAIN TYPE: ICD-10-CM

## 2025-08-21 DIAGNOSIS — I48.0 PAROXYSMAL ATRIAL FIBRILLATION (HCC): ICD-10-CM

## 2025-08-21 DIAGNOSIS — E78.2 MIXED HYPERLIPIDEMIA: ICD-10-CM

## 2025-08-21 DIAGNOSIS — D68.69 HYPERCOAGULABLE STATE DUE TO PAROXYSMAL ATRIAL FIBRILLATION (HCC): Chronic | ICD-10-CM

## 2025-08-21 DIAGNOSIS — R60.0 LOWER EXTREMITY EDEMA: ICD-10-CM

## 2025-08-21 DIAGNOSIS — Z79.01 CHRONIC ANTICOAGULATION: ICD-10-CM

## 2025-08-21 DIAGNOSIS — I48.0 HYPERCOAGULABLE STATE DUE TO PAROXYSMAL ATRIAL FIBRILLATION (HCC): Chronic | ICD-10-CM

## 2025-08-21 PROBLEM — N17.9 ACUTE RENAL FAILURE (HCC): Status: RESOLVED | Noted: 2025-03-09 | Resolved: 2025-08-21

## 2025-08-21 PROBLEM — R13.10 DYSPHAGIA: Status: RESOLVED | Noted: 2025-03-09 | Resolved: 2025-08-21

## 2025-08-21 PROBLEM — R00.1 JUNCTIONAL BRADYCARDIA: Status: RESOLVED | Noted: 2025-03-09 | Resolved: 2025-08-21

## 2025-08-21 PROBLEM — R63.4 WEIGHT LOSS: Status: RESOLVED | Noted: 2025-02-20 | Resolved: 2025-08-21

## 2025-08-21 PROCEDURE — 3078F DIAST BP <80 MM HG: CPT | Performed by: NURSE PRACTITIONER

## 2025-08-21 PROCEDURE — 99214 OFFICE O/P EST MOD 30 MIN: CPT | Performed by: NURSE PRACTITIONER

## 2025-08-21 PROCEDURE — 3074F SYST BP LT 130 MM HG: CPT | Performed by: NURSE PRACTITIONER

## 2025-08-21 RX ORDER — ATORVASTATIN CALCIUM 40 MG/1
40 TABLET, FILM COATED ORAL DAILY
Qty: 100 TABLET | Refills: 3 | Status: SHIPPED | OUTPATIENT
Start: 2025-08-21 | End: 2026-09-25

## 2025-08-21 RX ORDER — SPIRONOLACTONE 50 MG/1
50 TABLET, FILM COATED ORAL DAILY
Qty: 100 TABLET | Refills: 3 | Status: SHIPPED | OUTPATIENT
Start: 2025-08-21

## 2025-08-21 RX ORDER — TORSEMIDE 20 MG/1
20 TABLET ORAL DAILY
Qty: 100 TABLET | Refills: 3 | Status: SHIPPED | OUTPATIENT
Start: 2025-08-21

## 2025-08-21 RX ORDER — DILTIAZEM HYDROCHLORIDE 240 MG/1
240 CAPSULE, COATED, EXTENDED RELEASE ORAL DAILY
Qty: 100 CAPSULE | Refills: 3 | Status: SHIPPED | OUTPATIENT
Start: 2025-08-21

## 2025-08-21 RX ORDER — FLECAINIDE ACETATE 50 MG/1
50 TABLET ORAL 2 TIMES DAILY
Qty: 200 TABLET | Refills: 3 | Status: SHIPPED | OUTPATIENT
Start: 2025-08-21

## 2025-08-21 ASSESSMENT — ENCOUNTER SYMPTOMS
COUGH: 0
HEADACHES: 0
WEIGHT LOSS: 0
ABDOMINAL PAIN: 0
NAUSEA: 0
BACK PAIN: 1
BRUISES/BLEEDS EASILY: 0
FEVER: 0
LOSS OF CONSCIOUSNESS: 0
ORTHOPNEA: 0
DIZZINESS: 0
INSOMNIA: 0
PALPITATIONS: 0
CHILLS: 0
SHORTNESS OF BREATH: 0
PND: 0
MYALGIAS: 0

## 2025-08-21 ASSESSMENT — FIBROSIS 4 INDEX: FIB4 SCORE: 1.45

## (undated) DEVICE — PACK SINGLE BASIN - (6/CA)

## (undated) DEVICE — HEAD HOLDER JUNIOR/ADULT

## (undated) DEVICE — SET EPIDURAL MICRO SAPPHIRE EPIDAURAL INFUSION  (1/EA)

## (undated) DEVICE — SPONGE GAUZESTER 4 X 4 4PLY - (128PK/CA)

## (undated) DEVICE — SUCTION INSTRUMENT YANKAUER BULBOUS TIP W/O VENT (50EA/CA)

## (undated) DEVICE — ELECTRODE DUAL RETURN W/ CORD - (50/PK)

## (undated) DEVICE — TUBE CONNECT SUCTION CLEAR 120 X 1/4" (50EA/CA)"

## (undated) DEVICE — GOWN SURGEONS X-LARGE - DISP. (30/CA)

## (undated) DEVICE — CATHETER FOLEY 22 FR 30 CC (12EA/CA)

## (undated) DEVICE — SET IRRIGATION CYSTOSCOPY Y-TYPE L81 IN (20EA/CA)

## (undated) DEVICE — SET LEADWIRE 5 LEAD BEDSIDE DISPOSABLE ECG (1SET OF 5/EA)

## (undated) DEVICE — NEPTUNE 4 PORT MANIFOLD - (20/PK)

## (undated) DEVICE — TUBING CLEARLINK DUO-VENT - C-FLO (48EA/CA)

## (undated) DEVICE — GLOVE BIOGEL SZ 7 SURGICAL PF LTX - (50PR/BX 4BX/CA)

## (undated) DEVICE — GOWN WARMING STANDARD FLEX - (30/CA)

## (undated) DEVICE — SYRINGE TOOMEY (50EA/CA)

## (undated) DEVICE — LACTATED RINGERS INJ 1000 ML - (14EA/CA 60CA/PF)

## (undated) DEVICE — GOWN SURGICAL X-LARGE ULTRA - FILM-REINFORCED (20/CA)

## (undated) DEVICE — GLOVE BIOGEL SZ 7.5 SURGICAL PF LTX - (50PR/BX 4BX/CA)

## (undated) DEVICE — CORD RESECTO DISP ACT DAC-1 FOR USA RESECTOSCOPE (12EA/BX)

## (undated) DEVICE — KIT ANESTHESIA W/CIRCUIT & 3/LT BAG W/FILTER (20EA/CA)

## (undated) DEVICE — COVER FOOT UNIVERSAL DISP. - (25EA/CA)

## (undated) DEVICE — ELECTRODE ROLLERBALL 24FR - (10/BX) OLD # RE-24

## (undated) DEVICE — KIT ROOM DECONTAMINATION

## (undated) DEVICE — PROTECTOR ULNA NERVE - (36PR/CA)

## (undated) DEVICE — MASK, LARYNGEAL AIRWAY #4

## (undated) DEVICE — SENSOR SPO2 NEO LNCS ADHESIVE (20/BX) SEE USER NOTES

## (undated) DEVICE — ELECTRODE 850 FOAM ADHESIVE - HYDROGEL RADIOTRNSPRNT (50/PK)

## (undated) DEVICE — GLYCINE IRRIGATION 1.5% - 3000 ML  (4/CS)

## (undated) DEVICE — SLEEVE, VASO, THIGH, MED

## (undated) DEVICE — SYRINGE 30 ML LL (56/BX)

## (undated) DEVICE — GLOVE BIOGEL PI INDICATOR SZ 6.5 SURGICAL PF LF - (50/BX 4BX/CA)

## (undated) DEVICE — PACK CYSTOSCOPY III - (8/CA)

## (undated) DEVICE — CONNECTOR HOSE NEPTUNE FOR CYSTO ROOM

## (undated) DEVICE — JELLY, KY 2 0Z STERILE

## (undated) DEVICE — MASK ANESTHESIA ADULT  - (100/CA)

## (undated) DEVICE — SET EXTENSION WITH 2 PORTS (48EA/CA) ***PART #2C8610 IS A SUBSTITUTE*****

## (undated) DEVICE — EVACUATOR BLADDER ELLIK - (10/BX)